# Patient Record
Sex: FEMALE | Race: WHITE | NOT HISPANIC OR LATINO | Employment: OTHER | URBAN - METROPOLITAN AREA
[De-identification: names, ages, dates, MRNs, and addresses within clinical notes are randomized per-mention and may not be internally consistent; named-entity substitution may affect disease eponyms.]

---

## 2017-04-24 ENCOUNTER — CONVERSION ENCOUNTER (OUTPATIENT)
Dept: GENERAL RADIOLOGY | Facility: HOSPITAL | Age: 62
End: 2017-04-24

## 2018-05-16 ENCOUNTER — CONVERSION ENCOUNTER (OUTPATIENT)
Dept: GENERAL RADIOLOGY | Facility: HOSPITAL | Age: 63
End: 2018-05-16

## 2019-08-07 ENCOUNTER — HOSPITAL ENCOUNTER (OUTPATIENT)
Dept: OTHER | Facility: HOSPITAL | Age: 64
Discharge: HOME | End: 2019-08-07

## 2019-08-07 LAB
ALBUMIN SERPL-MCNC: 5 G/DL (ref 3.5–5)
ALBUMIN/GLOB SERPL: 1.7 {RATIO} (ref 1.4–2.6)
ALP SERPL-CCNC: 69 U/L (ref 43–160)
ALT SERPL-CCNC: 16 U/L (ref 10–40)
ANION GAP SERPL CALC-SCNC: 28 MMOL/L (ref 8–19)
AST SERPL-CCNC: 22 U/L (ref 15–50)
BASOPHILS # BLD AUTO: 0.05 10*3/UL (ref 0–0.2)
BASOPHILS NFR BLD AUTO: 0.7 % (ref 0–3)
BILIRUB SERPL-MCNC: 0.42 MG/DL (ref 0.2–1.3)
BUN SERPL-MCNC: 18 MG/DL (ref 5–25)
BUN/CREAT SERPL: 20 {RATIO} (ref 6–20)
CALCIUM SERPL-MCNC: 10.4 MG/DL (ref 8.7–10.4)
CHLORIDE SERPL-SCNC: 102 MMOL/L (ref 99–111)
CONV ABS IMM GRAN: 0.02 10*3/UL (ref 0–0.2)
CONV CO2: 22 MMOL/L (ref 22–32)
CONV IMMATURE GRAN: 0.3 % (ref 0–1.8)
CONV TOTAL PROTEIN: 8 G/DL (ref 6.3–8.2)
CREAT UR-MCNC: 0.91 MG/DL (ref 0.5–0.9)
DEPRECATED RDW RBC AUTO: 43.2 FL (ref 36.4–46.3)
EOSINOPHIL # BLD AUTO: 0.03 10*3/UL (ref 0–0.7)
EOSINOPHIL # BLD AUTO: 0.4 % (ref 0–7)
ERYTHROCYTE [DISTWIDTH] IN BLOOD BY AUTOMATED COUNT: 12.5 % (ref 11.7–14.4)
GFR SERPLBLD BASED ON 1.73 SQ M-ARVRAT: >60 ML/MIN/{1.73_M2}
GLOBULIN UR ELPH-MCNC: 3 G/DL (ref 2–3.5)
GLUCOSE SERPL-MCNC: 106 MG/DL (ref 65–99)
HCT VFR BLD AUTO: 38 % (ref 37–47)
HGB BLD-MCNC: 12.5 G/DL (ref 12–16)
LYMPHOCYTES # BLD AUTO: 1.55 10*3/UL (ref 1–5)
LYMPHOCYTES NFR BLD AUTO: 23.2 % (ref 20–45)
MCH RBC QN AUTO: 30.9 PG (ref 27–31)
MCHC RBC AUTO-ENTMCNC: 32.9 G/DL (ref 33–37)
MCV RBC AUTO: 94.1 FL (ref 81–99)
MONOCYTES # BLD AUTO: 0.6 10*3/UL (ref 0.2–1.2)
MONOCYTES NFR BLD AUTO: 9 % (ref 3–10)
NEUTROPHILS # BLD AUTO: 4.43 10*3/UL (ref 2–8)
NEUTROPHILS NFR BLD AUTO: 66.4 % (ref 30–85)
NRBC CBCN: 0 % (ref 0–0.7)
OSMOLALITY SERPL CALC.SUM OF ELEC: 306 MOSM/KG (ref 273–304)
PLATELET # BLD AUTO: 204 10*3/UL (ref 130–400)
PMV BLD AUTO: 11.7 FL (ref 9.4–12.3)
POTASSIUM SERPL-SCNC: 4.7 MMOL/L (ref 3.5–5.3)
RBC # BLD AUTO: 4.04 10*6/UL (ref 4.2–5.4)
SODIUM SERPL-SCNC: 147 MMOL/L (ref 135–147)
TSH SERPL-ACNC: 1.11 M[IU]/L (ref 0.27–4.2)
WBC # BLD AUTO: 6.68 10*3/UL (ref 4.8–10.8)

## 2020-04-09 ENCOUNTER — TELEPHONE CONVERTED (OUTPATIENT)
Dept: FAMILY MEDICINE CLINIC | Facility: CLINIC | Age: 65
End: 2020-04-09
Attending: FAMILY MEDICINE

## 2020-04-13 ENCOUNTER — CONVERSION ENCOUNTER (OUTPATIENT)
Dept: FAMILY MEDICINE CLINIC | Facility: CLINIC | Age: 65
End: 2020-04-13

## 2020-04-13 ENCOUNTER — HOSPITAL ENCOUNTER (OUTPATIENT)
Dept: GENERAL RADIOLOGY | Facility: HOSPITAL | Age: 65
Discharge: HOME | End: 2020-04-13
Attending: FAMILY MEDICINE

## 2020-04-13 ENCOUNTER — TELEPHONE CONVERTED (OUTPATIENT)
Dept: FAMILY MEDICINE CLINIC | Facility: CLINIC | Age: 65
End: 2020-04-13
Attending: FAMILY MEDICINE

## 2020-04-14 ENCOUNTER — OFFICE VISIT CONVERTED (OUTPATIENT)
Dept: ORTHOPEDIC SURGERY | Facility: CLINIC | Age: 65
End: 2020-04-14
Attending: ORTHOPAEDIC SURGERY

## 2020-04-24 ENCOUNTER — OFFICE VISIT CONVERTED (OUTPATIENT)
Dept: ORTHOPEDIC SURGERY | Facility: CLINIC | Age: 65
End: 2020-04-24
Attending: ORTHOPAEDIC SURGERY

## 2020-05-12 ENCOUNTER — OFFICE VISIT CONVERTED (OUTPATIENT)
Dept: ORTHOPEDIC SURGERY | Facility: CLINIC | Age: 65
End: 2020-05-12
Attending: ORTHOPAEDIC SURGERY

## 2020-06-09 ENCOUNTER — OFFICE VISIT CONVERTED (OUTPATIENT)
Dept: ORTHOPEDIC SURGERY | Facility: CLINIC | Age: 65
End: 2020-06-09
Attending: ORTHOPAEDIC SURGERY

## 2020-07-23 ENCOUNTER — OFFICE VISIT CONVERTED (OUTPATIENT)
Dept: ORTHOPEDIC SURGERY | Facility: CLINIC | Age: 65
End: 2020-07-23
Attending: PHYSICIAN ASSISTANT

## 2020-09-03 ENCOUNTER — HOSPITAL ENCOUNTER (OUTPATIENT)
Dept: GENERAL RADIOLOGY | Facility: HOSPITAL | Age: 65
Discharge: HOME | End: 2020-09-03
Attending: FAMILY MEDICINE

## 2020-10-28 ENCOUNTER — CONVERSION ENCOUNTER (OUTPATIENT)
Dept: FAMILY MEDICINE CLINIC | Facility: CLINIC | Age: 65
End: 2020-10-28

## 2020-10-28 ENCOUNTER — OFFICE VISIT CONVERTED (OUTPATIENT)
Dept: FAMILY MEDICINE CLINIC | Facility: CLINIC | Age: 65
End: 2020-10-28
Attending: NURSE PRACTITIONER

## 2020-10-28 ENCOUNTER — HOSPITAL ENCOUNTER (OUTPATIENT)
Dept: GENERAL RADIOLOGY | Facility: HOSPITAL | Age: 65
Discharge: HOME | End: 2020-10-28
Attending: NURSE PRACTITIONER

## 2020-10-28 LAB
ALBUMIN SERPL-MCNC: 4.6 G/DL (ref 3.5–5)
ALBUMIN/GLOB SERPL: 1.6 {RATIO} (ref 1.4–2.6)
ALP SERPL-CCNC: 83 U/L (ref 43–160)
ALT SERPL-CCNC: 15 U/L (ref 10–40)
ANION GAP SERPL CALC-SCNC: 16 MMOL/L (ref 8–19)
AST SERPL-CCNC: 26 U/L (ref 15–50)
BASOPHILS # BLD AUTO: 0.07 10*3/UL (ref 0–0.2)
BASOPHILS NFR BLD AUTO: 0.9 % (ref 0–3)
BILIRUB SERPL-MCNC: 0.23 MG/DL (ref 0.2–1.3)
BUN SERPL-MCNC: 11 MG/DL (ref 5–25)
BUN/CREAT SERPL: 14 {RATIO} (ref 6–20)
CALCIUM SERPL-MCNC: 9.5 MG/DL (ref 8.7–10.4)
CHLORIDE SERPL-SCNC: 102 MMOL/L (ref 99–111)
CHOLEST SERPL-MCNC: 214 MG/DL (ref 107–200)
CHOLEST/HDLC SERPL: 3 {RATIO} (ref 3–6)
CONV ABS IMM GRAN: 0.01 10*3/UL (ref 0–0.2)
CONV CO2: 27 MMOL/L (ref 22–32)
CONV IMMATURE GRAN: 0.1 % (ref 0–1.8)
CONV TOTAL PROTEIN: 7.5 G/DL (ref 6.3–8.2)
CREAT UR-MCNC: 0.81 MG/DL (ref 0.5–0.9)
DEPRECATED RDW RBC AUTO: 44.6 FL (ref 36.4–46.3)
EOSINOPHIL # BLD AUTO: 0.06 10*3/UL (ref 0–0.7)
EOSINOPHIL # BLD AUTO: 0.8 % (ref 0–7)
ERYTHROCYTE [DISTWIDTH] IN BLOOD BY AUTOMATED COUNT: 12.5 % (ref 11.7–14.4)
GFR SERPLBLD BASED ON 1.73 SQ M-ARVRAT: >60 ML/MIN/{1.73_M2}
GLOBULIN UR ELPH-MCNC: 2.9 G/DL (ref 2–3.5)
GLUCOSE SERPL-MCNC: 86 MG/DL (ref 65–99)
HCT VFR BLD AUTO: 39.5 % (ref 37–47)
HDLC SERPL-MCNC: 71 MG/DL (ref 40–60)
HGB BLD-MCNC: 12.4 G/DL (ref 12–16)
LDLC SERPL CALC-MCNC: 124 MG/DL (ref 70–100)
LYMPHOCYTES # BLD AUTO: 2.27 10*3/UL (ref 1–5)
LYMPHOCYTES NFR BLD AUTO: 30.1 % (ref 20–45)
MCH RBC QN AUTO: 30.2 PG (ref 27–31)
MCHC RBC AUTO-ENTMCNC: 31.4 G/DL (ref 33–37)
MCV RBC AUTO: 96.3 FL (ref 81–99)
MONOCYTES # BLD AUTO: 0.69 10*3/UL (ref 0.2–1.2)
MONOCYTES NFR BLD AUTO: 9.2 % (ref 3–10)
NEUTROPHILS # BLD AUTO: 4.43 10*3/UL (ref 2–8)
NEUTROPHILS NFR BLD AUTO: 58.9 % (ref 30–85)
NRBC CBCN: 0 % (ref 0–0.7)
OSMOLALITY SERPL CALC.SUM OF ELEC: 291 MOSM/KG (ref 273–304)
PLATELET # BLD AUTO: 224 10*3/UL (ref 130–400)
PMV BLD AUTO: 12.7 FL (ref 9.4–12.3)
POTASSIUM SERPL-SCNC: 4.1 MMOL/L (ref 3.5–5.3)
RBC # BLD AUTO: 4.1 10*6/UL (ref 4.2–5.4)
SODIUM SERPL-SCNC: 141 MMOL/L (ref 135–147)
T4 FREE SERPL-MCNC: 1.1 NG/DL (ref 0.9–1.8)
TRIGL SERPL-MCNC: 97 MG/DL (ref 40–150)
TSH SERPL-ACNC: 1.21 M[IU]/L (ref 0.27–4.2)
VLDLC SERPL-MCNC: 19 MG/DL (ref 5–37)
WBC # BLD AUTO: 7.53 10*3/UL (ref 4.8–10.8)

## 2020-10-29 LAB
FOLATE SERPL-MCNC: >20 NG/ML (ref 4.8–20)
IRON SATN MFR SERPL: 28 % (ref 20–55)
IRON SERPL-MCNC: 109 UG/DL (ref 60–170)
TIBC SERPL-MCNC: 388 UG/DL (ref 245–450)
TRANSFERRIN SERPL-MCNC: 271 MG/DL (ref 250–380)
VIT B12 SERPL-MCNC: 745 PG/ML (ref 211–911)

## 2021-05-10 NOTE — H&P
"   History and Physical      Patient Name: Alissa Bruce   Patient ID: [id number]   Sex: Female   YOB: 1955    Primary Care Provider: Rubén Garcia DO   Referring Provider: Rubén Garcia DO    Visit Date: April 14, 2020    Provider: Eric Duff MD   Location: [facility]   Location Address: [address] [place] [address]  [id number]   Location Phone: [phone]          Chief Complaint  · Right humerus pain       History Of Present Illness  Alissa Bruce is a 64 year old /White female who presents today to [facility].      Patient presents today for right humerus pain. Patient has had some recent falls, previously injuring her back. Patient has had previous X-Ray showing mildly displaced humerus fracture. The patient was placed into a sling. Patient reports pain in the right shoulder and the deltoid area of the mid arm. Patient is otherwise healthy. Patient is right hand dominant.       Review of Systems  · Constitutional  o Denies  o : fever, chills, weight loss  · Cardiovascular  o Denies  o : chest pain, shortness of breath  · Gastrointestinal  o Denies  o : liver disease, heartburn, nausea, blood in stools  · Genitourinary  o Denies  o : painful urination, blood in urine  · Integument  o Denies  o : rash, itching  · Neurologic  o Denies  o : headache, weakness, loss of consciousness  · Musculoskeletal  o Denies  o : painful, swollen joints  · Psychiatric  o Denies  o : drug/alcohol addiction, anxiety, depression      Vitals  Date Time BP Position Site L\R Cuff Size HR RR TEMP (F) WT  HT  BMI kg/m2 BSA m2 O2 Sat        04/14/2020 01:29 PM      74 - R   140lbs 4oz 5'  3\" 24.84 1.68 98 %          Physical Examination  · Constitutional  o Appearance  o : well developed, well-nourished, no obvious deformities present  · Head and Face  o Head  o :   § Inspection  § : normocephalic  o Face  o :   § Inspection  § : no facial lesions  · Eyes  o Conjunctivae  o : " conjunctivae normal  o Sclerae  o : sclerae white  · Ears, Nose, Mouth and Throat  o Ears  o :   § External Ears  § : appearance within normal limits  § Hearing  § : intact  o Nose  o :   § External Nose  § : appearance normal  · Neck  o Inspection/Palpation  o : normal appearance  o Range of Motion  o : full range of motion  · Respiratory  o Respiratory Effort  o : breathing unlabored  o Inspection of Chest  o : normal appearance  o Auscultation of Lungs  o : no audible wheezing or rales  · Cardiovascular  o Heart  o : regular rate  · Gastrointestinal  o Abdominal Examination  o : soft and non-tender  · Skin and Subcutaneous Tissue  o General Inspection  o : intact, no rashes  · Psychiatric  o General  o : Alert and oriented x3  o Judgement and Insight  o : judgment and insight intact  o Mood and Affect  o : mood normal, affect appropriate  · Right Arm  o Inspection  o : skin is intact. Mild swelling. Tenderness to palpation to right upper arm. Minimal pain with elbow range of motion as well as shoulder range of motion. Neurovascularly intact to right upper extremity.   · In Office Procedures  o View  o : AP/LATERAL  o Site  o : Right shoulder   o Indication  o : Right shoulder pain   o Study  o : X-rays ordered, taken in the office, and reviewed today.  o Comparative Data  o : No comparative data found  · Imaging  o Imaging  o : Mildly displaced and impacted fracture of the neck of the proximal right humerus.          Assessment  · Right shoulder pain, unspecified chronicity     719.41/M25.511  · Humerus fracture     812.20/S42.309A      Plan  · Orders  o Scapula (Right) [facility] Preferred View ([id number]) - 719.41/M25.511 - 04/14/2020  · Medications  o Medications have been Reconciled  o Transition of Care or Provider Policy  · Instructions  o Reviewed the patient's Past Medical, Social, and Family history as well as the ROS at today's visit, no changes.  o Call or return if worsening symptoms.  o This note was  transcribed by Bernardo Lozano. cecilia.  o Discussed with patient operative vs. non operative treatment. Patient wishes to proceed with initial conservative treatment. Patient was prescribed Norco today and will follow up in 10 days for repeat X-Rays of shoulder. New immobilizer given today.            Electronically Signed by: Kenneth Lozano - , Other -Author on April 15, 2020 02:21:02 PM  Electronically Co-signed by: Eric Duff MD -Reviewer on April 16, 2020 09:21:19 PM

## 2021-05-12 NOTE — PROGRESS NOTES
Progress Note      Patient Name: Alissa Bruce   Patient ID: [id number]   Sex: Female   YOB: 1955    Primary Care Provider: Rubén Garcia DO   Referring Provider: Rubén Garcia DO    Visit Date: April 24, 2020    Provider: Eric Duff MD   Location: [facility]   Location Address: [address] [place] [address]  [id number]   Location Phone: [phone]          Chief Complaint  · Right Proximal Humerus Fracture      History Of Present Illness  Alissa Bruce is a 64 year old /White female who presents today to [facility].      She presents for follow up of the right shoulder. She's overall been doing well. She says that she's noticing increasing bruising in the arm. She's been using the hand. She's been compliant with sling wear. She was taking some pain medication.       Past Medical History  Depression with anxiety; Hip pain; Pap smear for cervical cancer screening; Pap Smear for Vaginal Cancer Screening; Screening Mammogram         Past Surgical History  Colonoscopy; Cyst Removal         Medication List  cyclobenzaprine 10 mg oral tablet; diclofenac sodium 75 mg oral tablet,delayed release (DR/EC); lorazepam 1 mg oral tablet; mirtazapine 15 mg oral tablet; Norco 7.5-325 mg oral tablet; risperidone 1 mg oral tablet; sertraline 100 mg oral tablet         Allergy List  SULFA (SULFONAMIDES)         Family Medical History  Heart Disease; Cancer, Unspecified; Diabetes, unspecified type         Social History  Alcohol Use (Never); Denies alcohol consumption; lives with spouse; .; Nonsmoker (Never); Recreational Drug Use (Never); Retired.; Tobacco (Never)         Review of Systems  · Constitutional  o Denies  o : fever, chills, weight loss  · Cardiovascular  o Denies  o : chest pain, shortness of breath  · Gastrointestinal  o Denies  o : liver disease, heartburn, nausea, blood in stools  · Genitourinary  o Denies  o : painful urination, blood in  "urine  · Integument  o Denies  o : rash, itching  · Neurologic  o Admits  o : headache, weakness  o Denies  o : loss of consciousness  · Musculoskeletal  o Denies  o : painful, swollen joints  · Psychiatric  o Admits  o : anxiety, depression  o Denies  o : drug/alcohol addiction      Vitals  Date Time BP Position Site L\R Cuff Size HR RR TEMP (F) WT  HT  BMI kg/m2 BSA m2 O2 Sat        04/24/2020 10:18 AM         140lbs 0oz 5'  3\" 24.8 1.68           Physical Examination  · Constitutional  o Appearance  o : well developed, well-nourished, no obvious deformities present  · Head and Face  o Head  o :   § Inspection  § : normocephalic  o Face  o :   § Inspection  § : no facial lesions  · Eyes  o Conjunctivae  o : conjunctivae normal  o Sclerae  o : sclerae white  · Ears, Nose, Mouth and Throat  o Ears  o :   § External Ears  § : appearance within normal limits  § Hearing  § : intact  o Nose  o :   § External Nose  § : appearance normal  · Neck  o Inspection/Palpation  o : normal appearance  o Range of Motion  o : full range of motion  · Respiratory  o Respiratory Effort  o : breathing unlabored  o Inspection of Chest  o : normal appearance  o Auscultation of Lungs  o : no audible wheezing or rales  · Cardiovascular  o Heart  o : regular rate  · Gastrointestinal  o Abdominal Examination  o : soft and non-tender  · Skin and Subcutaneous Tissue  o General Inspection  o : intact, no rashes  · Psychiatric  o General  o : Alert and oriented x3  o Judgement and Insight  o : judgment and insight intact  o Mood and Affect  o : mood normal, affect appropriate  · Right Shoulder  o Inspection  o : Resoling bruising/ecchymosis to the distal upper arm and medial elbow area. Neurovascularly intact to the hand. Mild swelling to the fingers. Elbow ROM intact. She can flex and extend the shoulder and abduct to about 50 degrees. No crepitus. Minimal pain with ROM.   · In Office Procedures  o View  o : AP/LATERAL  o Site  o : right " shoulder  o Indication  o : right shoulder pain  o Study  o : X-rays ordered, taken in the office, and reviewed today.  o Xray  o : Impacted two part proximal humerus fracture; Diggs anterior angulation on the lateral view; No glenohumeral subluxation; No dislocation.   o Comparative Data  o : No comparative data found          Assessment  · Right proximal humerus fracture     812.00/S42.201A  · Pain of right upper extremity     729.5/M79.601  · Right shoulder pain, unspecified chronicity     719.41/M25.511      Plan  · Orders  o Scapula (Right) [facility] Preferred View ([id number]) - 719.41/M25.511 - 04/24/2020  · Medications  o Medications have been Reconciled  o Transition of Care or Provider Policy  · Instructions  o Reviewed the patient's Past Medical, Social, and Family history as well as the ROS at today's visit, no changes.  o Call or return if worsening symptoms.  o This note was transcribed by Angie Pineda. jsb  o Discussed treatment options with patient. Discussed ORIF versus nonoperative treatment. She does have an angulated proximal humerus fracture; however, it is impacted and appears stable. She is clinically doing very well. Her pain has been controlled. She has the ability to move the shoulder without much discomfort. I discussed that fixing her shoulder, at this point, I am not sure it would afford her a superior clinical outcome as nonoperative treatment. She expressed an understanding of this. I suggested continuing with nonoperative treatment. Follow up in 2 week with repeat x-rays to the shoulder. Likely she can begin weaning out of the sling and physical therapy at that time. We will get her a refill of pain medication to take us needed. Follow up with any problems.            Electronically Signed by: Angie Pineda - , Other -Author on April 24, 2020 03:03:35 PM  Electronically Co-signed by: Eric Duff MD -Reviewer on April 26, 2020 08:46:40 PM  Electronically  Co-signed by: Dunia Bliss Other -Reviewer on May 11, 2020 10:42:39 AM

## 2021-05-12 NOTE — PROGRESS NOTES
Quick Note      Patient Name: Alissa Bruce   Patient ID: [id number]   Sex: Female   YOB: 1955    Referring Provider: Rubén Garcia DO    Visit Date: April 13, 2020    Provider: Rubén Garcia DO   Location: [facility]   Location Address: [address] [place] [address] [address]  [place], [place]  [id number]   Location Phone: [phone]          History Of Present Illness  TELEHEALTH VISIT  Chief Complaint: Pt states light headed, dizzy, fell at 1pm today and hurt arm between elbow and shoulder, states has had the dizziness for about a month or two. Does not have any energy heart rate has been elevated blood pressure has been low. Around 92/66 Pt states  is present, Feng Bruce is a 64 year old /White female who is presenting for evaluation via telehealth visit. Verbal consent obtained before beginning visit.   Provider spent 15 minutes with the patient during telehealth visit.   The following staff were present during this visit: Doris Jaramillo LPN, Rubén Garcia DO   Past Medical History/Overview of Patient Symptoms       Patient called complaining of a fall on her arm, history of osteoporosis she reports and had fallen a few weeks back and landed on her tail bone. Says she couldn't move her arm up at the shoulder at all without considerable pain so advised her to come in for x-ray later today and see if broken and would refer to Ortho and provide sling if indeed did       Vitals  Date Time BP Position Site L\R Cuff Size HR RR TEMP (F) WT  HT  BMI kg/m2 BSA m2 O2 Sat HC       04/13/2020 03:04 /78 Sitting    91 - R                     Assessment  · Shoulder pain, right     719.41/M25.511  · Fall     E888.9/W19.XXXA  · Dizziness     780.4/R42  · Polypharmacy     V58.69/Z79.899  · Arm pain     729.5/M79.603         Fall  r/o humeral fracture  *order x-ray and evaluate for fracture, supportive care or referral based on results  **humeral fracture  after review and patient presented for imaging  discussed with ortho and referred to be seen next day, prn NSAIDs    advised STRONGLY adjust meds or wean down off 1 of 3 or more medicines contributing to dizziness     Problems Reconciled  Plan  · Orders  o Humerus (Right) 2 or more views X-Ray [facility] Preferred View ([id number]) - 719.41/M25.511 - 04/13/2020   Dr. Duff will see pt in morning.   o ORTHOPEDIC CONSULTATION (ORTHO) - 719.41/M25.511 - 04/13/2020  · Medications  o diclofenac sodium 75 mg oral tablet,delayed release (DR/EC)   SIG: take 1 tablet (75 mg) by oral route 2 times per day for 15 days   DISP: (30) tablets with 0 refills  Prescribed on 04/13/2020     o Medications have been Reconciled  o Transition of Care or Provider Policy  · Instructions  o Plan Of Care:   o Chronic conditions reviewed and taken into consideration for today's treatment plan.  o Patient instructed to seek medical attention urgently for new or worsening symptoms.  o Call the office with any concerns or questions.  o Risks, benefits, and alternatives were discussed with the patient. The patient is aware of risks associated with:  · Disposition  o Call or Return if symptoms worsen or persist.            Electronically Signed by: Rubén Garcia DO -Author on April 29, 2020 11:03:35 PM

## 2021-05-12 NOTE — PROGRESS NOTES
"   Quick Note      Patient Name: Alissa Bruce   Patient ID: [id number]   Sex: Female   YOB: 1955        Visit Date: April 9, 2020    Provider: Rubén Garcia DO   Location: [facility]   Location Address: [address] [place] [address] [address]  [place], [place]  [id number]   Location Phone: [phone]          History Of Present Illness  TELEHEALTH VISIT  Chief Complaint: establish care   Alissa Bruce is a 64 year old /White female who is presenting for evaluation via telehealth visit. Verbal consent obtained before beginning visit.   Provider spent 15 minutes with the patient during telehealth visit.   The following staff were present during this visit: Rubén Garcia DO   Past Medical History/Overview of Patient Symptoms       Patient calls to establish care, has been calling in to clinic requesting medicines for her hip after she fell on her bottom about a couple weeks ago. She did not go to ED and didn't call right after it happened, reported to be ambulating well without problems just sore. Advised her to come in or make an apt but for whatever reason she has just now gotten scheduled.     PMH significant for depression anxiety, on lorazepam and antipsychotics managed by Psych in [facility] reviewed. Otherwise she has still yet to bring in paperwork for new patient, insurance or anything.     She did discuss medicines above with Psych possibly making her a little dizzy lightheaded when she fell. She is taking nsaids and Tylenol, flexeril helps intermittently and feels weak on her feet at times. No further falls, denies prior fractures or back problems. Admits history of osteoporosis in which treatment \"didn't work\" though, no DEXA recent bloodwork or imaging.     She denies blood sugar or pressure problems, heart disease or other.           Assessment  · Hip pain     719.45/M25.559  · Depression with anxiety     300.4/F41.8  · Low back pain     724.2/M54.5         Low " back, sacral pain  *subacute  continue Tylenol and motrin  renew flexeril, precautions given strongly  f/u psych on meds and cause for intermittent dizziness  back xray and sacral view ordered as well as routine labs, cbc cmp flp tsh  f/u 1-2 weeks to review, can have done at her convenience advised to call ahead  consider dexa additionally    anxiety depression  *controlled, f/u psych    f/u 1-2 weeks for back pain and fall, review imaging and labs if done, ordered to be done by patient if worse not improving or concerned       Plan  · Orders  o Physical, Primary Care Panel (CBC, CMP, Lipid, TSH) [facility] ([id number], 91389, [id number], [id number]) - 719.45/M25.559, 300.4/F41.8, 724.2/M54.5 - 04/09/2020  o Lumbar Spine AP and Lateral X-Ray [facility] ([id number]) - 719.45/M25.559, 724.2/M54.5 - 04/09/2020   advised patient to call ahead of time to get labs and xray if she wants, schedule lab xray apt   o Sacrum and Coccyx 2-3 views [facility] Preferred View ([id number]) - 719.45/M25.559, 724.2/M54.5 - 04/09/2020  · Medications  o cyclobenzaprine 10 mg oral tablet   SIG: take 1 tablet (10 mg) by oral route 3 times per day for 5 days   DISP: (15) tablets with 1 refills  Refilled on 04/09/2020     o Medications have been Reconciled  · Instructions  o Plan Of Care:   o Patient was educated/instructed on their diagnosis, treatment and medications prior to discharge from the clinic today.  o Patient is taking medications as prescribed and doing well.   o Risks, benefits, and alternatives were discussed with the patient. The patient is aware of risks associated with:  o Discussed Covid-19 precautions including, but not limited to, social distancing, avoid touching your face, and hand washing.   · Disposition  o Call or Return if symptoms worsen or persist.  o Labs at follow up  o Return Visit Request in/on 2 weeks +/- 2 days ([id number]).            Electronically Signed by: Rubén Garcia DO -Author on April 9, 2020 11:00:47 AM

## 2021-05-13 NOTE — PROGRESS NOTES
Progress Note      Patient Name: Alissa Bruce   Patient ID: [id number]   Sex: Female   YOB: 1955    Primary Care Provider: Rubén Garcia DO   Referring Provider: Rubén Garcia DO    Visit Date: May 12, 2020    Provider: Eric Duff MD   Location: [facility]   Location Address: [address] [place] [address]  [id number]   Location Phone: [phone]          Chief Complaint  · Right Proximal Shoulder Fracture      History Of Present Illness  Alissa Bruce is a 64 year old /White female who presents today to [facility].      Patinet presents today for follow up of right proximal shoulder fracture. Patient is doing well today she has been in a sling. Patient has been using the arm some, but no strenuous activities. She has no new complaints or injuries. She has had a little bit of bruising and swelling to the arm. Patient has mainly good days but there are some that are painful. Patient is trending in a much better direction in regards to her pain and use of her arm.       Past Medical History  Depression with anxiety; Hip pain; Pap smear for cervical cancer screening; Pap Smear for Vaginal Cancer Screening; Screening Mammogram         Past Surgical History  Colonoscopy; Cyst Removal         Medication List  cyclobenzaprine 10 mg oral tablet; diclofenac sodium 75 mg oral tablet,delayed release (DR/EC); lorazepam 1 mg oral tablet; mirtazapine 15 mg oral tablet; Norco 7.5-325 mg oral tablet; risperidone 1 mg oral tablet; sertraline 100 mg oral tablet         Allergy List  SULFA (SULFONAMIDES)         Family Medical History  Heart Disease; Cancer, Unspecified; Diabetes, unspecified type         Social History  Alcohol Use (Never); Denies alcohol consumption; lives with spouse; .; Nonsmoker (Never); Recreational Drug Use (Never); Retired.; Tobacco (Never)         Review of Systems  · Constitutional  o Denies  o : fever, chills, weight  "loss  · Cardiovascular  o Denies  o : chest pain, shortness of breath  · Gastrointestinal  o Denies  o : liver disease, heartburn, nausea, blood in stools  · Genitourinary  o Denies  o : painful urination, blood in urine  · Integument  o Denies  o : rash, itching  · Neurologic  o Denies  o : headache, weakness, loss of consciousness  · Musculoskeletal  o Denies  o : painful, swollen joints  · Psychiatric  o Denies  o : drug/alcohol addiction, anxiety, depression      Vitals  Date Time BP Position Site L\R Cuff Size HR RR TEMP (F) WT  HT  BMI kg/m2 BSA m2 O2 Sat        05/12/2020 10:32 AM      76 - R   140lbs 0oz 5'  3\" 24.8 1.68 97 %          Physical Examination  · Constitutional  o Appearance  o : well developed, well-nourished, no obvious deformities present  · Head and Face  o Head  o :   § Inspection  § : normocephalic  o Face  o :   § Inspection  § : no facial lesions  · Eyes  o Conjunctivae  o : conjunctivae normal  o Sclerae  o : sclerae white  · Ears, Nose, Mouth and Throat  o Ears  o :   § External Ears  § : appearance within normal limits  § Hearing  § : intact  o Nose  o :   § External Nose  § : appearance normal  · Neck  o Inspection/Palpation  o : normal appearance  o Range of Motion  o : full range of motion  · Respiratory  o Respiratory Effort  o : breathing unlabored  o Inspection of Chest  o : normal appearance  o Auscultation of Lungs  o : no audible wheezing or rales  · Cardiovascular  o Heart  o : regular rate  · Gastrointestinal  o Abdominal Examination  o : soft and non-tender  · Skin and Subcutaneous Tissue  o General Inspection  o : intact, no rashes  · Psychiatric  o General  o : Alert and oriented x3  o Judgement and Insight  o : judgment and insight intact  o Mood and Affect  o : mood normal, affect appropriate  · Right Arm  o Inspection  o : Patient has some mild swelling to the right arm. Neurovascularly intact to the hand. Elbow ROM is intact. Forward elevation is 90. abduction 80. " internal rotation of the side. external rotation is 50. strength 4 out of 5. No crepitus with ROM minimal pain with ROM  · In Office Procedures  o View  o : AP/LATERAL  o Site  o : right, shoulder   o Indication  o : Right shoulder pain   o Study  o : X-rays ordered, taken in the office, and reviewed today.  o Xray  o : Healing impacted proximal humerus fracture, mild displacement and angulation. Progressive healing of fracture.               Assessment  · Fracture, humerus     812.20/S42.309A  · Right shoulder pain, unspecified chronicity     719.41/M25.511      Plan  · Orders  o Scapula (Right) [facility] Preferred View ([id number]) - 719.41/M25.511 - 05/12/2020  · Medications  o Medications have been Reconciled  o Transition of Care or Provider Policy  · Instructions  o Dr. Duff saw and examined the patient and agrees with plan.   o X-rays reviewed by Dr. Duff.  o Reviewed the patient's Past Medical, Social, and Family history as well as the ROS at today's visit, no changes.  o Call or return if worsening symptoms.  o This note was transcribed by Bernardo Lozano. cecilia.  o Discussed plan with patient. She has some anxiety and does not wish to do a formal PT program. I have shown her PT ROM activities in the office today. We provided a handout with home exercises. Follow up in one month. Repeat X-Rays to the right shoulder. Advised patient to call with any problems.             Electronically Signed by: Kenneth Lozano - , Other -Author on May 13, 2020 09:35:29 AM  Electronically Co-signed by: Eric Duff MD -Reviewer on May 13, 2020 09:24:24 PM

## 2021-05-13 NOTE — PROGRESS NOTES
Progress Note      Patient Name: Alissa Bruce   Patient ID: [id number]   Sex: Female   YOB: 1955    Primary Care Provider: Rubén Garcia DO   Referring Provider: Rubén Garcia DO    Visit Date: July 23, 2020    Provider: Brent Santiago PA-C   Location: [facility]   Location Address: [address] [place] [address]  [id number]   Location Phone: [phone]          Chief Complaint  · Right shoulder pain  · Right elbow pain      History Of Present Illness  Alissa Bruce is a 64 year old /White female who presents today to [facility]. Patient presents for follow-up evaluation of right proximal humerus fracture and also complaining of right elbow swelling. Patient states her right shoulder is doing well she has never attended physical therapy she chose not to and has been doing home exercises she says that she is using the shoulder without too much pain but certain movements cause her pain when trying to reach above her head or behind her back. Patient denies new pain or injury. No x-rays were taken today of the right shoulder. Patient states that 2 to 3 weeks ago her right elbow she noticed started swelling she states it is soft and squishy she states she has had no injury to the elbow, no prior episodes, patient states it became swollen and has stayed the same since it became swollen.       Past Medical History  Depression with anxiety; Hip pain; Pap smear for cervical cancer screening; Pap Smear for Vaginal Cancer Screening; Screening Mammogram         Past Surgical History  Colonoscopy; Cyst Removal         Medication List  diclofenac sodium 75 mg oral tablet,delayed release (DR/EC); lorazepam 1 mg oral tablet; mirtazapine 15 mg oral tablet; risperidone 1 mg oral tablet; sertraline 100 mg oral tablet         Allergy List  SULFA (SULFONAMIDES)       Allergies Reconciled  Family Medical History  Heart Disease; Cancer, Unspecified; Diabetes, unspecified type  "        Social History  Alcohol Use (Never); Denies alcohol consumption; lives with spouse; .; Nonsmoker (Never); Recreational Drug Use (Never); Retired.; Tobacco (Never)         Review of Systems  · Constitutional  o Denies  o : fever, chills, weight loss  · Cardiovascular  o Denies  o : chest pain, shortness of breath  · Gastrointestinal  o Denies  o : liver disease, heartburn, nausea, blood in stools  · Genitourinary  o Denies  o : painful urination, blood in urine  · Integument  o Denies  o : rash, itching  · Neurologic  o Denies  o : headache, weakness, loss of consciousness  · Musculoskeletal  o Denies  o : painful, swollen joints  · Psychiatric  o Denies  o : drug/alcohol addiction, anxiety, depression      Vitals  Date Time BP Position Site L\R Cuff Size HR RR TEMP (F) WT  HT  BMI kg/m2 BSA m2 O2 Sat        07/23/2020 10:41 AM      77 - R   141lbs 2oz 5'  3\" 25 1.69 98 %          Physical Examination  · Constitutional  o Appearance  o : well developed, well-nourished, no obvious deformities present  · Head and Face  o Head  o :   § Inspection  § : normocephalic  o Face  o :   § Inspection  § : no facial lesions  · Eyes  o Conjunctivae  o : conjunctivae normal  o Sclerae  o : sclerae white  · Ears, Nose, Mouth and Throat  o Ears  o :   § External Ears  § : appearance within normal limits  § Hearing  § : intact  o Nose  o :   § External Nose  § : appearance normal  · Neck  o Inspection/Palpation  o : normal appearance  o Range of Motion  o : full range of motion  · Respiratory  o Respiratory Effort  o : breathing unlabored  o Inspection of Chest  o : normal appearance  o Auscultation of Lungs  o : no audible wheezing or rales  · Cardiovascular  o Heart  o : regular rate  · Gastrointestinal  o Abdominal Examination  o : soft and non-tender  · Skin and Subcutaneous Tissue  o General Inspection  o : intact, no rashes  · Psychiatric  o General  o : Alert and oriented x3  o Judgement and Insight  o : " judgment and insight intact  o Mood and Affect  o : mood normal, affect appropriate  · Right Shoulder  o Inspection  o : Nontender to palpation, forward elevation 150, abduction 100, external rotation at 90 degrees abduction 45, internal rotation at 90 degrees abduction 45, mild stiffness and pain with range of motion.  · Right Elbow  o Inspection  o : Skin is intact, swelling to the posterior elbow with fluid, no tenderness to palpation, full range of motion of the elbow with flexion extension pronation supination. No redness no heat, no signs of infection.  · Injection Note/Aspiration Note  o Site  o : right elbow  o Procedure  o : Procedure: After educating the patient, patient gave consent for procedure. After using Chloraprep, the joint space was injected. The patient tolerated the procedure well.  o Medication  o : Aspiration prior to injection of 80 mg of DepoMedrol with 1cc of 1% Lidocaine  · In Office Procedures  o View  o : AP/LATERAL  o Site  o : right, elbow  o Indication  o : Right elbow pain  o Study  o : X-rays ordered, taken in the office, and reviewed today.  o Xray  o : No fracture, no dislocation, swelling to posterior elbow.  o Comparative Data  o : No comparative data found          Assessment  · Right elbow pain     719.42/M25.521  · Right shoulder pain, unspecified chronicity     719.41/M25.511  · Right proximal humerus fracture     812.00/S42.209A  · Bursitis of right elbow     726.33/M70.31      Plan  · Orders  o Depo-Medrol injection 80mg () - 719.42/M25.521 - 07/23/2020   Lot [id number] Exp 06 2021 [employer] Administered by NILA SCHERER PA-C  o Elbow-Lat Single Tendon (Injection) (36750) - 719.42/M25.521 - 07/23/2020   Lot [id number] Exp 07 2021 [employer] Administered by NILA SCHERER PA-C  o Elbow (Right) 2 views X-Ray [facility] ([id number]) - 719.42/M25.521 - 07/23/2020  · Medications  o Medications have been Reconciled  o Transition of Care or Provider  Policy  · Instructions  o Reviewed the patient's Past Medical, Social, and Family history as well as the ROS at today's visit, no changes.  o Call or return if worsening symptoms.  o Follow up in 6-8 weeks.  o Advised patient to continue activity as tolerated for the right shoulder, patient was written for physical therapy but she states she may or may not attend. Reviewed right elbow x-rays with the patient, patient elected to have right elbow aspiration and injection, patient tolerated this well. 6 cc of clear yellow aspirate were obtained. Patient was then injected with steroids and wrapped with compression dressing.. Follow-up 6 to 8 weeks for reevaluation.            Electronically Signed by: AYAN Muller-GARFIELD -Author on July 23, 2020 12:19:54 PM  Electronically Co-signed by: Eric Duff MD -Reviewer on July 23, 2020 09:54:00 PM

## 2021-05-13 NOTE — PROGRESS NOTES
Progress Note      Patient Name: Alissa Bruce   Patient ID: [id number]   Sex: Female   YOB: 1955    Primary Care Provider: Rubén Garica DO   Referring Provider: Rubén Garcia DO    Visit Date: June 9, 2020    Provider: Eric Duff MD   Location: [facility]   Location Address: [address] [place] [address]  [id number]   Location Phone: [phone]          Chief Complaint  · Right shoulder pain      History Of Present Illness  Alissa Bruce is a 64 year old /White female who presents today to [facility].      The patient presents for follow up with her proximal right shoulder fracture. The patient reports some pain down the arm. She has been treating the pain with Tylenol and working on home exercises. She reports some shoulder stiffness when she wakes up in the morning.                      Past Medical History  Depression with anxiety; Hip pain; Pap smear for cervical cancer screening; Pap Smear for Vaginal Cancer Screening; Screening Mammogram         Past Surgical History  Colonoscopy; Cyst Removal         Medication List  cyclobenzaprine 10 mg oral tablet; diclofenac sodium 75 mg oral tablet,delayed release (DR/EC); lorazepam 1 mg oral tablet; mirtazapine 15 mg oral tablet; Norco 7.5-325 mg oral tablet; risperidone 1 mg oral tablet; sertraline 100 mg oral tablet         Allergy List  SULFA (SULFONAMIDES)         Family Medical History  Heart Disease; Cancer, Unspecified; Diabetes, unspecified type         Social History  Alcohol Use (Never); Denies alcohol consumption; lives with spouse; .; Nonsmoker (Never); Recreational Drug Use (Never); Retired.; Tobacco (Never)         Review of Systems  · Constitutional  o Denies  o : fever, chills, weight loss  · Cardiovascular  o Denies  o : chest pain, shortness of breath  · Gastrointestinal  o Denies  o : liver disease, heartburn, nausea, blood in stools  · Genitourinary  o Denies  o : painful  "urination, blood in urine  · Integument  o Denies  o : rash, itching  · Neurologic  o Denies  o : headache, weakness, loss of consciousness  · Musculoskeletal  o Denies  o : painful, swollen joints  · Psychiatric  o Denies  o : drug/alcohol addiction, anxiety, depression      Vitals  Date Time BP Position Site L\R Cuff Size HR RR TEMP (F) WT  HT  BMI kg/m2 BSA m2 O2 Sat        06/09/2020 11:06 AM         140lbs 0oz 5'  3\" 24.8 1.68           Physical Examination  · Constitutional  o Appearance  o : well developed, well-nourished, no obvious deformities present  · Head and Face  o Head  o :   § Inspection  § : normocephalic  o Face  o :   § Inspection  § : no facial lesions  · Eyes  o Conjunctivae  o : conjunctivae normal  o Sclerae  o : sclerae white  · Ears, Nose, Mouth and Throat  o Ears  o :   § External Ears  § : appearance within normal limits  § Hearing  § : intact  o Nose  o :   § External Nose  § : appearance normal  · Neck  o Inspection/Palpation  o : normal appearance  o Range of Motion  o : full range of motion  · Respiratory  o Respiratory Effort  o : breathing unlabored  o Inspection of Chest  o : normal appearance  o Auscultation of Lungs  o : no audible wheezing or rales  · Cardiovascular  o Heart  o : regular rate  · Gastrointestinal  o Abdominal Examination  o : soft and non-tender  · Skin and Subcutaneous Tissue  o General Inspection  o : intact, no rashes  · Psychiatric  o General  o : Alert and oriented x3  o Judgement and Insight  o : judgment and insight intact  o Mood and Affect  o : mood normal, affect appropriate  · Right Shoulder  o Inspection  o : , AB 90, ER 55, IR to L3, no crepitus. Non tender to palpation. Mild swelling. Neurovascularly intact. No popping or catching.   · In Office Procedures  o View  o : AP/LATERAL  o Site  o : right shoulder  o Indication  o : right shoulder pain  o Study  o : X-rays ordered, taken in the office, and reviewed today.  o Xray  o : Healing " surgical neck fracture with progressive healing and no callous formation, good joint placement  o Comparative Data  o : No comparative data found              Assessment  · Right shoulder pain, unspecified chronicity     719.41/M25.511  · Fracture : right proximal shoulder     829.0/T14.8XXA      Plan  · Orders  o Scapula (Right) [facility] Preferred View ([id number]-QT) - 719.41/M25.511 - 06/09/2020  · Medications  o Medications have been Reconciled  o Transition of Care or Provider Policy  · Instructions  o Dr. Duff saw and examined the patient and agrees with plan.   o X-rays reviewed by Dr. Duff.  o Reviewed the patient's Past Medical, Social, and Family history as well as the ROS at today's visit, no changes.  o Call or return if worsening symptoms.  o The above service was scribed by Bernardo Lozano on my behalf and I attest to the accuracy of the note. jsb  o Discussed treatment options with the patient. She is doing well today. We discussed that PT would really help her. Prescription given for PT today. ROM as tolerated. Follow up in 6 weeks to re-check her ROM. No xrays needed unless clinically indicated.             Electronically Signed by: Kenneth Lozano - , Other -Author on Teresa 10, 2020 09:32:38 AM  Electronically Co-signed by: Eric Duff MD -Reviewer on Teresa 10, 2020 10:11:44 PM

## 2021-05-13 NOTE — PROGRESS NOTES
Progress Note      Patient Name: Alissa Bruce   Patient ID: [id number]   Sex: Female   YOB: 1955    Primary Care Provider: Rubén Garcia DO   Referring Provider: Rubén Garcia DO    Visit Date: October 28, 2020    Provider: SHAQ Acosta   Location: [facility]   Location Address: [address] [place] [address]  [id number]   Location Phone: [phone]          Chief Complaint  · fatigue/weakness      History Of Present Illness  Alissa Bruce is a 64 year old /White female who presents for evaluation and treatment of:      Pt presents with fatigue/weakness, dizziness x several years. No recent labs. Takes lorazapam, sertaline, mirtazapine, rexulti, oxcarpazipine. Goes to Dr. Wallace at [facility] for psych tx.       Past Medical History  Disease Name Date Onset Notes   Depression with anxiety --  --    Hip pain --  --    Pap smear for cervical cancer screening 2018 --    Pap Smear for Vaginal Cancer Screening 2018 --    Screening Mammogram 2018 --          Past Surgical History  Procedure Name Date Notes   Colonoscopy 2008 --    Cyst Removal 1976 wrist         Medication List  Name Date Started Instructions   diclofenac sodium 75 mg oral tablet,delayed release (/EC) 04/13/2020 take 1 tablet (75 mg) by oral route 2 times per day for 15 days   lorazepam 1 mg oral tablet 03/18/2020 --    mirtazapine 15 mg oral tablet  take 1 tablet (15 mg) by oral route once daily before bedtime for 30 days   oxcarbazepine 150 mg oral tablet  take 2 tablets (300 mg) by oral route 2 times per day for 30 days   Rexulti 2 mg oral tablet  take 1 tablet (2 mg) by oral route once daily for 30 days         Allergy List  Allergen Name Date Reaction Notes   SULFA (SULFONAMIDES) --  --  --          Family Medical History  Disease Name Relative/Age Notes   Heart Disease Mother/   Mother   Cancer, Unspecified Brother/   Brother   Diabetes, unspecified type Mother/   Mother  "        Social History  Finding Status Start/Stop Quantity Notes   Alcohol Use Never --/-- --  does not drink   Denies alcohol consumption --  --/-- --  --    lives with spouse --  --/-- --  --    . --  --/-- --  --    Nonsmoker Never --/-- --  04/13/2020 -    Recreational Drug Use Never --/-- --  no   Retired. --  --/-- --  --    Tobacco Never --/-- --  never smoker         Review of Systems  · Constitutional  o Admits  o : fatigue, weakness  o Denies  o : fever, weight loss, weight gain  · Cardiovascular  o Denies  o : lower extremity edema, claudication, chest pressure, palpitations  · Respiratory  o Denies  o : shortness of breath, wheezing, cough, hemoptysis, dyspnea on exertion  · Gastrointestinal  o Denies  o : nausea, vomiting, diarrhea, constipation, abdominal pain  · Psychiatric  o Admits  o : anxiety, depression  o Denies  o : suicidal ideation, homicidal ideation      Vitals  Date Time BP Position Site L\R Cuff Size HR RR TEMP (F) WT  HT  BMI kg/m2 BSA m2 O2 Sat FR L/min FiO2        10/28/2020 02:08 /74 Sitting    98 - R 20 98 144lbs 8oz 5'  3\" 25.6 1.71 99 %  21%          Physical Examination  · Constitutional  o Appearance  o : no acute distress, well-nourished  · Head and Face  o Head  o :   § Inspection  § : atraumatic, normocephalic  o Face  o :   § Inspection  § : no facial lesions  · Eyes  o Eyes  o : extraocular movements intact, no scleral icterus, no conjunctival injection  · Ears, Nose, Mouth and Throat  o Ears  o :   § Ears  § : normal  o Nose  o :   § Intranasal Exam  § : nares patent  o Oral Cavity  o :   § Oral Mucosa  § : moist mucous membranes  · Respiratory  o Respiratory  o : breathing comfortably, symmetric chest rise, clear to asculatation bilaterally, no wheezes, rales, or rhonchii  · Cardiovascular  o Heart  o :   § Auscultation of Heart  § : regular rate and rhythm, no murmurs, rubs, or gallops  o Peripheral Vascular System  o :   § Extremities  § : no " edema  · Lymphatic  o Neck  o : no lymphadenopathy present  o Supraclavicular Nodes  o : no supraclavicular nodes  · Skin and Subcutaneous Tissue  o General Inspection  o : no lesions present, no areas of discoloration, skin turgor normal  · Neurologic  o Gait and Station  o :   § Gait Screening  § : normal gait  · Psychiatric  o General  o : normal mood, flat affect          Assessment  · Anxiety disorder     300.00/F41.9  · Depression     311/F32.9  · Fatigue     780.79/R53.83  · Weakness     780.79/R53.1  · Screening for lipid disorders     V77.91/Z13.220  · Screening for depression     V79.0/Z13.31       Fatigue/weakiness:  Order for labs    Anxiety/depression:  Uncontrolled  Discussed tx options  Referral to new psychiatrist, Sharlene Olmos.   Continue all medications as prescribed  Proceed to ED, [facility], [facility] if S/S worsen or become severe or thoughts of suicide or self-harm  Pt has had Gene Sight testing in the past, advised to bring results to visit with Sharlene.     Problems Reconciled  Plan  · Orders  o Psychiatric Consultation (PSYCH) - 300.00/F41.9 - 10/28/2020   Sharlene Olmos, PMHNP  o CBC with Auto Diff [facility] (19462) - 780.79/R53.83 - 10/28/2020  o CMP [facility] (64732) - 780.79/R53.83 - 10/28/2020  o Iron panel (iron, TIBC, transferrin saturation) (95746, 70984, 73014) - 780.79/R53.83 - 10/28/2020  o Thyroid Profile (81643, 28819, THYII) - 780.79/R53.83 - 10/28/2020  o B12 Folate levels (B12FO) - 780.79/R53.83 - 10/28/2020  o Lipid Panel [facility] (44079) - V77.91/Z13.220 - 10/28/2020  o ACO-39: Current medications updated and reviewed (1159F, ) - - 10/28/2020  o ACO-18: Positive screen for clinical depression using a standardized tool and a follow-up plan documented () - - 10/28/2020  · Instructions  o Discussed the need for therapy, either with a certified counselor, psychologist, and/or family . If no improvement is noted or worsening of their condition, return to office or ER. But also discussed with patient  "that if they are non-responsive to the type of medication they may need to see a psychiatrist for further evaluation and management.  o Patient was given an SSRI/SSNRI medication and warned of possible side effects of the medication including potential for increased risk of suicidal thoughts and feelings. Patient was instructed that if they begin to exhibit any of these effects they will discontinue the medication immediately and contact our office or the ER ASAP.  o Patient agrees to a \"No Self Harm\" contract. Patient will either call us, 911, , [facility], [facility].  o Discussed the need for therapy, either with a certified counselor, psychologist, and/or family . If no improvement is noted or worsening of their condition, return to office or ER. But also discussed with patient that if they are non-responsive to the type of medication they may need to see a psychiatrist for further evaluation and management.  o Patient was given an SSRI/SSNRI medication and warned of possible side effects of the medication including potential for increased risk of suicidal thoughts and feelings. Patient was instructed that if they begin to exhibit any of these effects they will discontinue the medication immediately and contact our office or the ER [facility].  o Patient agrees to a \"No Self Harm\" contract. Patient will either call us, 911, ER, [facility], [facility].  o Take all medications as prescribed/directed.  o Patient was educated/instructed on their diagnosis, treatment and medications prior to discharge from the clinic today.  o Patient was instructed to exercise regularly.  o Patient instructed to seek medical attention urgently for new or worsening symptoms.  o Call the office with any concerns or questions.  o Bring all medicines with their bottles to each office visit.  o Risks, benefits, and alternatives were discussed with the patient. The patient " is aware of risks associated with: all meds and conditions.   o Chronic conditions reviewed and taken into consideration for today's treatment plan.  o Discussed Covid-19 precautions including, but not limited to, social distancing, avoid touching your face, and hand washing.   · Disposition  o Call or Return if symptoms worsen or persist.  o Follow Up PRN.  o Proceed to ED for all medical emergencies.  o Care Transition            Electronically Signed by: SHAQ Acosta -Author on October 28, 2020 02:46:44 PM

## 2021-05-14 VITALS
TEMPERATURE: 98 F | WEIGHT: 144.5 LBS | SYSTOLIC BLOOD PRESSURE: 119 MMHG | HEART RATE: 98 BPM | OXYGEN SATURATION: 99 % | RESPIRATION RATE: 20 BRPM | DIASTOLIC BLOOD PRESSURE: 74 MMHG | HEIGHT: 63 IN | BODY MASS INDEX: 25.6 KG/M2

## 2021-05-15 VITALS — WEIGHT: 140 LBS | BODY MASS INDEX: 24.8 KG/M2 | HEIGHT: 63 IN

## 2021-05-15 VITALS — WEIGHT: 140 LBS | HEIGHT: 63 IN | BODY MASS INDEX: 24.8 KG/M2

## 2021-05-15 VITALS — SYSTOLIC BLOOD PRESSURE: 121 MMHG | HEART RATE: 91 BPM | DIASTOLIC BLOOD PRESSURE: 78 MMHG

## 2021-05-15 VITALS — BODY MASS INDEX: 24.85 KG/M2 | HEIGHT: 63 IN | HEART RATE: 74 BPM | OXYGEN SATURATION: 98 % | WEIGHT: 140.25 LBS

## 2021-05-15 VITALS — HEIGHT: 63 IN | OXYGEN SATURATION: 97 % | WEIGHT: 140 LBS | BODY MASS INDEX: 24.8 KG/M2 | HEART RATE: 76 BPM

## 2021-05-15 VITALS — BODY MASS INDEX: 25 KG/M2 | WEIGHT: 141.12 LBS | HEART RATE: 77 BPM | HEIGHT: 63 IN | OXYGEN SATURATION: 98 %

## 2021-06-03 ENCOUNTER — HOSPITAL ENCOUNTER (OUTPATIENT)
Dept: GENERAL RADIOLOGY | Facility: HOSPITAL | Age: 66
Discharge: HOME | End: 2021-06-03

## 2021-06-03 LAB
25(OH)D3 SERPL-MCNC: 36.8 NG/ML (ref 30–100)
ALBUMIN SERPL-MCNC: 4.7 G/DL (ref 3.5–5)
ALBUMIN/GLOB SERPL: 1.5 {RATIO} (ref 1.4–2.6)
ALP SERPL-CCNC: 81 U/L (ref 43–160)
ALT SERPL-CCNC: 19 U/L (ref 10–40)
ANION GAP SERPL CALC-SCNC: 17 MMOL/L (ref 8–19)
AST SERPL-CCNC: 24 U/L (ref 15–50)
BASOPHILS # BLD AUTO: 0.06 10*3/UL (ref 0–0.2)
BASOPHILS NFR BLD AUTO: 1 % (ref 0–3)
BILIRUB SERPL-MCNC: 0.41 MG/DL (ref 0.2–1.3)
BUN SERPL-MCNC: 11 MG/DL (ref 5–25)
BUN/CREAT SERPL: 12 {RATIO} (ref 6–20)
CALCIUM SERPL-MCNC: 9.5 MG/DL (ref 8.7–10.4)
CHLORIDE SERPL-SCNC: 101 MMOL/L (ref 99–111)
CHOLEST SERPL-MCNC: 216 MG/DL (ref 107–200)
CHOLEST/HDLC SERPL: 3.2 {RATIO} (ref 3–6)
CONV ABS IMM GRAN: 0.02 10*3/UL (ref 0–0.2)
CONV CO2: 26 MMOL/L (ref 22–32)
CONV IMMATURE GRAN: 0.3 % (ref 0–1.8)
CONV TOTAL PROTEIN: 7.8 G/DL (ref 6.3–8.2)
CREAT UR-MCNC: 0.9 MG/DL (ref 0.5–0.9)
DEPRECATED RDW RBC AUTO: 44.8 FL (ref 36.4–46.3)
EOSINOPHIL # BLD AUTO: 0.2 10*3/UL (ref 0–0.7)
EOSINOPHIL # BLD AUTO: 3.2 % (ref 0–7)
ERYTHROCYTE [DISTWIDTH] IN BLOOD BY AUTOMATED COUNT: 12.8 % (ref 11.7–14.4)
EST. AVERAGE GLUCOSE BLD GHB EST-MCNC: 111 MG/DL
FOLATE SERPL-MCNC: >20 NG/ML (ref 4.8–20)
GFR SERPLBLD BASED ON 1.73 SQ M-ARVRAT: >60 ML/MIN/{1.73_M2}
GLOBULIN UR ELPH-MCNC: 3.1 G/DL (ref 2–3.5)
GLUCOSE SERPL-MCNC: 101 MG/DL (ref 65–99)
HBA1C MFR BLD: 5.5 % (ref 3.5–5.7)
HCT VFR BLD AUTO: 38.1 % (ref 37–47)
HDLC SERPL-MCNC: 68 MG/DL (ref 40–60)
HGB BLD-MCNC: 12.1 G/DL (ref 12–16)
LDLC SERPL CALC-MCNC: 127 MG/DL (ref 70–100)
LYMPHOCYTES # BLD AUTO: 1.76 10*3/UL (ref 1–5)
LYMPHOCYTES NFR BLD AUTO: 28.3 % (ref 20–45)
MCH RBC QN AUTO: 30.2 PG (ref 27–31)
MCHC RBC AUTO-ENTMCNC: 31.8 G/DL (ref 33–37)
MCV RBC AUTO: 95 FL (ref 81–99)
MONOCYTES # BLD AUTO: 0.51 10*3/UL (ref 0.2–1.2)
MONOCYTES NFR BLD AUTO: 8.2 % (ref 3–10)
NEUTROPHILS # BLD AUTO: 3.68 10*3/UL (ref 2–8)
NEUTROPHILS NFR BLD AUTO: 59 % (ref 30–85)
NRBC CBCN: 0 % (ref 0–0.7)
OSMOLALITY SERPL CALC.SUM OF ELEC: 290 MOSM/KG (ref 273–304)
PLATELET # BLD AUTO: 218 10*3/UL (ref 130–400)
PMV BLD AUTO: 12.1 FL (ref 9.4–12.3)
POTASSIUM SERPL-SCNC: 4.3 MMOL/L (ref 3.5–5.3)
RBC # BLD AUTO: 4.01 10*6/UL (ref 4.2–5.4)
SODIUM SERPL-SCNC: 140 MMOL/L (ref 135–147)
T4 FREE SERPL-MCNC: 1.1 NG/DL (ref 0.9–1.8)
TRIGL SERPL-MCNC: 106 MG/DL (ref 40–150)
TSH SERPL-ACNC: 1.63 M[IU]/L (ref 0.27–4.2)
VIT B12 SERPL-MCNC: 695 PG/ML (ref 211–911)
VLDLC SERPL-MCNC: 21 MG/DL (ref 5–37)
WBC # BLD AUTO: 6.23 10*3/UL (ref 4.8–10.8)

## 2021-06-04 LAB — T3FREE SERPL-MCNC: 3 PG/ML (ref 2–4.4)

## 2022-12-21 NOTE — PATIENT INSTRUCTIONS
1.  Please return to clinic at your next scheduled visit.  Contact the clinic ([phone]) at least 24 hours prior in the event you need to cancel.  2.  Do no harm to yourself or others.    3.  Avoid alcohol and drugs.    4.  Take all medications as prescribed.  Please contact the clinic with any concerns. If you are in need of medication refills, please call the clinic at [phone].    5. Should you want to get in touch with your provider, Dr. Kai Elliott, please utilize [employer] or contact the office ([phone]), and staff will be able to page Dr. Elliott directly.  6.  In the event you have personal crisis, contact the following crisis numbers: Suicide Prevention Hotline 1-846.987.4412; TREASURE Helpline 5-789-568-RGSH; [facility] Emergency Room [phone]; text HELLO to 344704; or 031.     SPECIFIC RECOMMENDATIONS:     1.      Medications discussed at this encounter:                   -Start Abilify     2.      Psychotherapy recommendations:      3.     Return to clinic: 4 weeks

## 2022-12-21 NOTE — PROGRESS NOTES
"Subjective   Alissa Bruce is a 67 y.o. female who presents today for initial evaluation     Referring Provider:  Delfina Quevedo, [facility]  [address] [place] [address]    Chief Complaint: Anxiety    History of Present Illness:     12/21: Chart review: Patient has a history of anxiety for several years, followed by [facility] during this time for medication management with little success.  Sent to us for a second opinion.  On Wellbutrin  twice daily, doxepin 25 nightly, Klonopin 1 mg p.o. 3 times daily as needed, but she takes about 1-1/2 tabs daily.  Nothing in PDMP.  No Care Everywhere.  Older labs from June 2021 show reassuring CMP, thyroid studies, folate, iron studies, elevated lipids, reassuring B12, vitamin D, CBC.  No head imaging, EKG.    Psychotropic medication review includes Ativan, Vraylar, mirtazapine, Seroquel XR, Celexa, Cymbalta, Latuda, Adderall XR, Rexulti, Prozac, Abilify, Lamictal, BuSpar, Zoloft, Lexapro, Effexor.  There may have been others prior to [facility] management.    \"Alissa\" and \"Feng\"     12/22: In person.  Interview:  1. Chart review:   a. Would rule out ADHD.  b. Would rule out PTSD, consider guanfacine, clonidine, prazosin.  2. His/Her Story: \"I was working with Delfina Quevedo.\"  a. P15, G14  b. Had been working with Kamilah Dee also, and Dr. Rodrigo painting I never even saw Delfina -- maybe 2 years  d. Talks over me, talkative, but interruptible, long silences too  e. One day I woke up and \"something wasn't right\" 5 years ago  f. Didn't like the ativan  g. 6 mos I was perfectly fine: 2018 (jan to June)?  h. 2017, multiple losses, when it all began  i. Discussed how klonopin is addictive  j. Everything that I do \"excites me\"  k. Most of the day I watch TV  3. Depression/Mood:  a. Depressed mood, anhedonia, hopelessness, poor energy, poor concentration, weight loss or weight gain, psychomotor retardation or agitation, insomnia controlled on clonazepam.  b. Seasonal " "pattern:  c. Severity: Moderate  d. Duration: 5 years ago  4. Anxiety:  a. Uncontrolled worrying, muscle tension, fatigue, poor concentration, feeling on edge, irritability, insomnia.  b. Severity: Moderate to severe  c. Duration: 5 years ago  5. Panic attacks: n  6. Psych ROS: Positive for depression, anxiety.  Negative for psychosis and williams.  a. ADHD: Deferred  b. PTSD: Deferred  7. No SI HI AVH.  8. Substances: No  9. Therapy: Briefly  10. Medication compliant: y    Access to Firearms: Yes, locked away    PHQ-9 Depression Screening  PHQ-9 Total Score: 15    Little interest or pleasure in doing things? 3-->nearly every day   Feeling down, depressed, or hopeless? 2-->more than half the days   Trouble falling or staying asleep, or sleeping too much? 1-->several days (\"TAKE SLEEPING PILL\")   Feeling tired or having little energy? 3-->nearly every day   Poor appetite or overeating? 3-->nearly every day   Feeling bad about yourself - or that you are a failure or have let yourself or your family down? 2-->more than half the days   Trouble concentrating on things, such as reading the newspaper or watching television? 1-->several days   Moving or speaking so slowly that other people could have noticed? Or the opposite - being so fidgety or restless that you have been moving around a lot more than usual? 0-->not at all   Thoughts that you would be better off dead, or of hurting yourself in some way? 0-->not at all   PHQ-9 Total Score 15     VITOR-7  Feeling nervous, anxious or on edge: More than half the days  Not being able to stop or control worrying: More than half the days  Worrying too much about different things: More than half the days  Trouble Relaxing: More than half the days  Being so restless that it is hard to sit still: More than half the days  Feeling afraid as if something awful might happen: More than half the days  Becoming easily annoyed or irritable: More than half the days  VITOR 7 Total Score: 14  If you " checked any problems, how difficult have these problems made it for you to do your work, take care of things at home, or get along with other people: Very difficult    Past Surgical History:  History reviewed. No pertinent surgical history.    Problem List:  Patient Active Problem List   Diagnosis   • Depression with anxiety   • Hip pain       Allergy:   Allergies   Allergen Reactions   • Sulfa Antibiotics Unknown - Low Severity        Discontinued Medications:  Medications Discontinued During This Encounter   Medication Reason   • Brexpiprazole (Rexulti) 2 MG tablet Not Efficacious   • LORazepam (ATIVAN) 1 MG tablet Not Efficacious   • mirtazapine (REMERON) 15 MG tablet Not Efficacious   • OXcarbazepine (TRILEPTAL) 150 MG tablet Not Efficacious   • risperiDONE (risperDAL) 1 MG tablet Not Efficacious   • sertraline (ZOLOFT) 100 MG tablet Not Efficacious   • ARIPiprazole (Abilify) 2 MG tablet Reorder       Current Medications:   Current Outpatient Medications   Medication Sig Dispense Refill   • [START ON 2023] ARIPiprazole (Abilify) 2 MG tablet Take 1 tablet by mouth Daily. 30 tablet 2   • buPROPion SR (WELLBUTRIN SR) 100 MG 12 hr tablet Take 100 mg by mouth 2 (Two) Times a Day.     • clonazePAM (KlonoPIN) 1 MG tablet Take 1 mg by mouth 3 (Three) Times a Day As Needed.     • doxepin (SINEquan) 25 MG capsule Take 25 mg by mouth Every Night.       No current facility-administered medications for this visit.       Past Medical History:  Past Medical History:   Diagnosis Date   • Anxiety    • Depression        Past Psychiatric History:  Began Treatment: a few years ago  Diagnoses: dep and anx, bipolar 2  Psychiatrist: [facility]  Therapist: yes briefly  Admission History:Denies  Medication Trials:    Multiple, can't remember inidivdual effects    Only one that helped me get back to my old self was zoloft during those six months    Self Harm: Denies  Suicide Attempts:Denies   Psychosis, Anxiety, Depression:  "Denies    Substance Abuse History:   Types:Denies all, including illicit  Withdrawal Symptoms:Denies  Longest Period Sober:Not Applicable   AA: Not applicable     Social History:  Martial Status:  Employed:No  Kids:Yes  House:Lives in a house   History: Denies    Social History     Socioeconomic History   • Marital status:    Tobacco Use   • Smoking status: Never   • Smokeless tobacco: Never   Vaping Use   • Vaping Use: Never used   Substance and Sexual Activity   • Alcohol use: Never   • Drug use: Never   • Sexual activity: Defer       Family History:   Suicide Attempts: Denies  Suicide Completions:Denies      Family History   Problem Relation Age of Onset   • Bipolar disorder Sister        Developmental History:       Childhood: Denies Abuse  High School:Completed  College: denies    · Mental Status Exam  · Appearance  · : groomed, good eye contact, normal street clothes  · Behavior  · : pleasant and cooperative  · Motor  · : No abnormal  · Speech  · : talkative, normal rhythm, rate, volume, tone, not hyperverbal, not pressured, normal prosidy  · Mood  · : \"  · Affect  · : euthymic, mood congruent, good variability  · Thought Content  · : negative suicidal ideations, negative homicidal ideations, negative obsessions  · Perceptions  · : negative auditory hallucinations, negative visual hallucinations  · Thought Process  · : tangential  · Insight/Judgement  · : Fair/fair  · Cognition  · : grossly intact  · Attention   : intact      Review of Systems:  Review of Systems   Constitutional: Positive for fatigue. Negative for diaphoresis.   HENT: Negative for drooling.    Eyes: Negative for visual disturbance.   Respiratory: Negative for cough and shortness of breath.    Cardiovascular: Negative for chest pain, palpitations and leg swelling.   Gastrointestinal: Negative for nausea and vomiting.   Endocrine: Negative for cold intolerance and heat intolerance.   Genitourinary: Negative for difficulty " "urinating.   Musculoskeletal: Negative for joint swelling.   Allergic/Immunologic: Negative for immunocompromised state.   Neurological: Negative for dizziness, seizures, speech difficulty and numbness.       Physical Exam:  Physical Exam    Vital Signs:   /66   Pulse 76   Ht 160 cm (63\")   Wt 62.9 kg (138 lb 9.6 oz)   BMI 24.55 kg/m²      Lab Results:   No visits with results within 6 Month(s) from this visit.   Latest known visit with results is:   Hospital Outpatient Visit on 06/03/2021   Component Date Value Ref Range Status   • WBC 06/03/2021 6.23  4.80 - 10.80 10*3/uL Final   • RBC 06/03/2021 4.01 (L)  4.20 - 5.40 10*6/uL Final   • Hemoglobin 06/03/2021 12.1  12.0 - 16.0 g/dL Final   • Hematocrit 06/03/2021 38.1  37.0 - 47.0 % Final   • MCV 06/03/2021 95.0  81.0 - 99.0 fL Final   • MCH 06/03/2021 30.2  27.0 - 31.0 pg Final   • MCHC 06/03/2021 31.8 (L)  33.0 - 37.0 Final   • RDW 06/03/2021 12.8  11.7 - 14.4 % Final   • Platelets 06/03/2021 218  130 - 400 10*3/uL Final   • MPV 06/03/2021 12.1  9.4 - 12.3 fL Final   • Neutrophil Rel % 06/03/2021 59.0  30.0 - 85.0 % Final   • Lymphocyte Rel % 06/03/2021 28.3  20.0 - 45.0 % Final   • Monocyte Rel % 06/03/2021 8.2  3.0 - 10.0 % Final   • Eosinophil Rel % 06/03/2021 3.2  0.0 - 7.0 % Final   • Basophil Rel % 06/03/2021 1.0  0.0 - 3.0 % Final   • Neutrophils Absolute 06/03/2021 3.68  2.00 - 8.00 10*3/uL Final   • Lymphocytes Absolute 06/03/2021 1.76  1.00 - 5.00 10*3/uL Final   • Monocytes Absolute 06/03/2021 0.51  0.20 - 1.20 10*3/uL Final   • Eosinophils Absolute 06/03/2021 0.20  0.00 - 0.70 10*3/uL Final   • Basophils Absolute 06/03/2021 0.06  0.00 - 0.20 10*3/uL Final   • Immature Granulocyte Rel % 06/03/2021 0.3  0.0 - 1.8 % Final   • Abs Imm Gran 06/03/2021 0.02  0.00 - 0.20 10*3/uL Final   • RDW-SD 06/03/2021 44.8  36.4 - 46.3 fL Final   • NRBC 06/03/2021 0.00  0.00 - 0.70 % Final   • Hemoglobin A1C 06/03/2021 5.5  3.5 - 5.7 % Final    Comment: " **Interpretation**  <7% in Controlled Diabetic Patients.  Assay Range 3.4-18.2%  ADA 2010 Standards of Medical Care in Diabetes suggest using  a cut point of >= 6.5% for diagnosis of diabetes and an A1C  range of 5.7%-6.4% as a category of increased risk for future  diabetes.     • Mean Bld Glu Estim. 06/03/2021 111  mg/dL Final   • Free T4 06/03/2021 1.1  0.9 - 1.8 ng/dL Final   • TSH 06/03/2021 1.630  0.270 - 4.200 m[iU]/L Final   • Triglycerides 06/03/2021 106  40 - 150 mg/dL Final    Comment: <150 mg/dL-Normal  150-199 mg/dL-Borderline High  200-499 mg/dL-High  >500 mg/dL- Very High     • Total Cholesterol 06/03/2021 216 (H)  107 - 200 mg/dL Final    Comment: <200 mg/dL-Desirable  200-239 mg/dL-Borderline High  >240 mg/dL-High  >500 mg/dL-Very High     • HDL Cholesterol 06/03/2021 68 (H)  40 - 60 mg/dL Final    Comment: <40 mg/dL-Low  >60 mg/dL- Desirable     • Chol/HDL Ratio 06/03/2021 3.2  3.0 - 6.0 NA Final   • LDL Cholesterol  06/03/2021 127 (H)  70 - 100 mg/dL Final    Comment: Recommended  LDL Levels  <100 mg/dL-Optimal  100-129 mg/dL-Near Optimal/above optimal  130-159 mg/dL-Borderline High  160-189 mg/dL-High  >190 mg/dL-Very High     • VLDL Cholesterol 06/03/2021 21  5 - 37 mg/dL Final   • Glucose 06/03/2021 101 (H)  65 - 99 mg/dL Final   • BUN 06/03/2021 11  5 - 25 mg/dL Final   • Creatinine 06/03/2021 0.90  0.50 - 0.90 mg/dL Final   • BUN/Creatinine Ratio 06/03/2021 12  6 - 20 [ratio] Final   • GFR 06/03/2021 >60  >60 mL/min/[1.73_m2] Final    Comment: Interpretative Data:  ------------------------------------  STAGE                  GFR  Stage 1                90 mL/min or greater  Stage 2                60-89 mL/min  Stage 3                30-59 mL/min  Stage 4                15-29 mL/min  Value <60 mL/min for 3 or more months is defined as CKD.     • Sodium 06/03/2021 140  135 - 147 mmol/L Final   • Potassium 06/03/2021 4.3  3.5 - 5.3 mmol/L Final   • Chloride 06/03/2021 101  99 - 111 mmol/L Final    • CO2 06/03/2021 26  22 - 32 mmol/L Final   • Anion Gap 06/03/2021 17  8 - 19 mmol/L Final   • OSMOLALITY CALC 06/03/2021 290  273 - 304 Final   • Total Protein 06/03/2021 7.8  6.3 - 8.2 g/dL Final    Comment: If Patient is receiving dextran as a blood volume expander  result may show a potential interference.     • Albumin 06/03/2021 4.7  3.5 - 5.0 g/dL Final   • Globulin 06/03/2021 3.1  2.0 - 3.5 g/dL Final   • A/G Ratio 06/03/2021 1.5  1.4 - 2.6 [ratio] Final   • Calcium 06/03/2021 9.5  8.7 - 10.4 mg/dL Final   • Alkaline Phosphatase 06/03/2021 81  43 - 160 U/L Final   • ALT (SGPT) 06/03/2021 19  10 - 40 U/L Final   • AST (SGOT) 06/03/2021 24  15 - 50 U/L Final   • Total Bilirubin 06/03/2021 0.41  0.20 - 1.30 mg/dL Final   • 25 Hydroxy, Vitamin D 06/03/2021 36.8  30.0 - 100.0 ng/mL Final    Comment: Vitamin D Status          Range  ---------------------------------------  Deficiency                <10 ng/mL  Insufficiency             10-30 ng/mL  Sufficiency                ng/mL  Toxicity                  >100 ng/mL     • Vitamin B-12 06/03/2021 695  211 - 911 pg/mL Final   • Folate 06/03/2021 >20.0  4.8 - 20.0 ng/mL Final    Comment: Results may be falsely decreased due to light exposure if  testing added on after collection.     • T3, Free 06/03/2021 3.0  2.0 - 4.4 pg/mL Final       EKG Results:  No orders to display       Imaging Results:  No Images in the past 120 days found..      Assessment & Plan   Diagnoses and all orders for this visit:    1. Major depressive disorder, recurrent episode, moderate (HCC) (Primary)  -     Discontinue: ARIPiprazole (Abilify) 2 MG tablet; Take 1 tablet by mouth Daily.  Dispense: 30 tablet; Refill: 2  -     ARIPiprazole (Abilify) 2 MG tablet; Take 1 tablet by mouth Daily.  Dispense: 30 tablet; Refill: 2    2. Generalized anxiety disorder    3. Insomnia due to mental condition  -     Discontinue: ARIPiprazole (Abilify) 2 MG tablet; Take 1 tablet by mouth Daily.   Dispense: 30 tablet; Refill: 2  -     ARIPiprazole (Abilify) 2 MG tablet; Take 1 tablet by mouth Daily.  Dispense: 30 tablet; Refill: 2        Visit Diagnoses:    ICD-10-CM ICD-9-CM   1. Major depressive disorder, recurrent episode, moderate (HCC)  F33.1 296.32   2. Generalized anxiety disorder  F41.1 300.02   3. Insomnia due to mental condition  F51.05 300.9     327.02     12/22: Very talkative and tangential, hard to get anything out of the patient.  EKG with answering questions, avoids saying yes or no to yes or no questions.  Start Abilify and see back in 4 weeks.  Request records from [employer].  Unclear what the diagnosis is but it could be bipolar.    PLAN:  11. Safety: No acute safety concerns  12. Therapy: None  13. Risk Assessment: Risk of self-harm acutely is moderate.  Risk factors include anxiety disorder, mood disorder, access to weapons and recent psychosocial stressors (pandemic). Protective factors include no family history, no present SI, no history of suicide attempts or self-harm in the past, minimal AODA, healthcare seeking, future orientation, willingness to engage in care.  Risk of self-harm chronically is also moderate, but could be further elevated in the event of treatment noncompliance and/or AODA.  14. Meds:  a. START Abilify 2 mg nightly. Risks, benefits, alternatives discussed with patient including increased energy, exacerbation of irritability, akathisia, GI upset, orthostatic hypotension, increased appetite, tardive dyskinesia.  After discussion of these risks and benefits, the patient voiced understanding and agreed to proceed.  b. CONTINUE Wellbutrin, clonazepam, doxepin.  15. Labs: None  16. Follow up: 4 weeks, urgent    Patient screened positive for depression based on a PHQ-9 score of  on . Follow-up recommendations include: Prescribed antidepressant medication treatment and Suicide Risk Assessment performed.           TREATMENT PLAN/GOALS: Continue supportive psychotherapy efforts  and medications as indicated. Treatment and medication options discussed during today's visit. Patient acknowledged and verbally consented to continue with current treatment plan and was educated on the importance of compliance with treatment and follow-up appointments.    MEDICATION ISSUES:  BRYNN reviewed as expected.  Discussed medication options and treatment plan of prescribed medication as well as the risks, benefits, and side effects including potential falls, possible impaired driving and metabolic adversities among others. Patient is agreeable to call the office with any worsening of symptoms or onset of side effects. Patient is agreeable to call 911 or go to the nearest ER should he/she begin having SI/HI. No medication side effects or related complaints today.     MEDS ORDERED DURING VISIT:  New Medications Ordered This Visit   Medications   • ARIPiprazole (Abilify) 2 MG tablet     Sig: Take 1 tablet by mouth Daily.     Dispense:  30 tablet     Refill:  2       Return in about 4 weeks (around 1/19/2023).         This document has been electronically signed by Kai Elliott MD  December 22, 2022 14:54 EST    Dictated Utilizing Dragon Dictation: Part of this note may be an electronic transcription/translation of spoken language to printed text using the Dragon Dictation System.

## 2022-12-22 ENCOUNTER — OFFICE VISIT (OUTPATIENT)
Dept: PSYCHIATRY | Facility: CLINIC | Age: 67
End: 2022-12-22

## 2022-12-22 VITALS
HEART RATE: 76 BPM | SYSTOLIC BLOOD PRESSURE: 109 MMHG | HEIGHT: 63 IN | DIASTOLIC BLOOD PRESSURE: 66 MMHG | WEIGHT: 138.6 LBS | BODY MASS INDEX: 24.56 KG/M2

## 2022-12-22 DIAGNOSIS — F33.1 MAJOR DEPRESSIVE DISORDER, RECURRENT EPISODE, MODERATE: Primary | ICD-10-CM

## 2022-12-22 DIAGNOSIS — F41.1 GENERALIZED ANXIETY DISORDER: ICD-10-CM

## 2022-12-22 DIAGNOSIS — F51.05 INSOMNIA DUE TO MENTAL CONDITION: ICD-10-CM

## 2022-12-22 PROBLEM — M25.559 HIP PAIN: Status: ACTIVE | Noted: 2022-12-22

## 2022-12-22 PROBLEM — F41.8 DEPRESSION WITH ANXIETY: Status: ACTIVE | Noted: 2022-12-22

## 2022-12-22 PROCEDURE — 90792 PSYCH DIAG EVAL W/MED SRVCS: CPT | Performed by: STUDENT IN AN ORGANIZED HEALTH CARE EDUCATION/TRAINING PROGRAM

## 2022-12-22 RX ORDER — CLONAZEPAM 1 MG/1
1 TABLET ORAL 3 TIMES DAILY PRN
COMMUNITY
End: 2023-01-17 | Stop reason: SDUPTHER

## 2022-12-22 RX ORDER — SERTRALINE HYDROCHLORIDE 100 MG/1
TABLET, FILM COATED ORAL
COMMUNITY
End: 2022-12-22

## 2022-12-22 RX ORDER — MIRTAZAPINE 15 MG/1
TABLET, FILM COATED ORAL
COMMUNITY
End: 2022-12-22

## 2022-12-22 RX ORDER — LORAZEPAM 1 MG/1
TABLET ORAL
COMMUNITY
End: 2022-12-22

## 2022-12-22 RX ORDER — ARIPIPRAZOLE 2 MG/1
2 TABLET ORAL DAILY
Qty: 30 TABLET | Refills: 2 | Status: SHIPPED | OUTPATIENT
Start: 2022-12-22 | End: 2022-12-22 | Stop reason: SDUPTHER

## 2022-12-22 RX ORDER — DOXEPIN HYDROCHLORIDE 25 MG/1
25 CAPSULE ORAL NIGHTLY
COMMUNITY
End: 2023-02-22

## 2022-12-22 RX ORDER — RISPERIDONE 1 MG/1
TABLET ORAL
COMMUNITY
End: 2022-12-22

## 2022-12-22 RX ORDER — BREXPIPRAZOLE 2 MG/1
TABLET ORAL
COMMUNITY
End: 2022-12-22

## 2022-12-22 RX ORDER — BUPROPION HYDROCHLORIDE 100 MG/1
100 TABLET, EXTENDED RELEASE ORAL 2 TIMES DAILY
COMMUNITY
End: 2023-02-22

## 2022-12-22 RX ORDER — ARIPIPRAZOLE 2 MG/1
2 TABLET ORAL DAILY
Qty: 30 TABLET | Refills: 2 | Status: SHIPPED | OUTPATIENT
Start: 2023-01-01 | End: 2023-01-17 | Stop reason: SDUPTHER

## 2022-12-22 RX ORDER — OXCARBAZEPINE 150 MG/1
TABLET, FILM COATED ORAL
COMMUNITY
End: 2022-12-22

## 2022-12-22 NOTE — TREATMENT PLAN
Multi-Disciplinary Problems (from Behavioral Health Treatment Plan)    Active Problems     Problem: Anxiety  Start Date: 12/22/22    Problem Details: The patient self-scales this problem as a 8 with 10 being the worst.        Goal Priority Start Date Expected End Date End Date    Patient will develop and implement behavioral and cognitive strategies to reduce anxiety and irrational fears. -- 12/22/22 -- --    Goal Details: Progress toward goal:  Not appropriate to rate progress toward goal since this is the initial treatment plan.        Goal Intervention Frequency Start Date End Date    Help patient explore past emotional issues in relation to present anxiety. Q Month 12/22/22 --    Intervention Details: Duration of treatment until until remission of symptoms.        Goal Intervention Frequency Start Date End Date    Help patient develop an awareness of their cognitive and physical responses to anxiety. Q Month 12/22/22 --    Intervention Details: Duration of treatment until until remission of symptoms.              Problem: Depression  Start Date: 12/22/22    Problem Details: The patient self-scales this problem as a 8 with 10 being the worst.        Goal Priority Start Date Expected End Date End Date    Patient will demonstrate the ability to initiate new constructive life skills outside of sessions on a consistent basis. -- 12/22/22 -- --    Goal Details: Progress toward goal:  Not appropriate to rate progress toward goal since this is the initial treatment plan.        Goal Intervention Frequency Start Date End Date    Assist patient in setting attainable activities of daily living goals. PRN 12/22/22 --    Goal Intervention Frequency Start Date End Date    Provide education about depression Q Month 12/22/22 --    Intervention Details: Duration of treatment until until remission of symptoms.        Goal Intervention Frequency Start Date End Date    Assist patient in developing healthy coping strategies. Q Month  12/22/22 --    Intervention Details: Duration of treatment until until remission of symptoms.                    Reviewed By     Kai Elliott MD 12/22/22 5931                 I have discussed and reviewed this treatment plan with the patient.

## 2022-12-30 ENCOUNTER — TELEPHONE (OUTPATIENT)
Dept: PSYCHIATRY | Facility: CLINIC | Age: 67
End: 2022-12-30

## 2022-12-30 NOTE — TELEPHONE ENCOUNTER
PT(PATIENT) VERIFIED      ARIPiprazole (Abilify) 2 MG tablet (2023)     PT(PATIENT) STATES MEDICATION IS NEW FOR HER AND SHE IS UNSURE OF THE BEST TIME OF DAY TO TAKE IT     PT(PATIENT) STATES SHE IS CONCERNED REGARDING DROWSINESS     PT(PATIENT) ADVISED MEDICATION CAN CAUSE DROWSINESS AND IT MAY BE BEST TO TAKE AT BEDTIME     PT(PATIENT) VERBALIZED UNDERSTANDING

## 2023-01-17 ENCOUNTER — OFFICE VISIT (OUTPATIENT)
Dept: PSYCHIATRY | Facility: CLINIC | Age: 68
End: 2023-01-17
Payer: MEDICARE

## 2023-01-17 VITALS
DIASTOLIC BLOOD PRESSURE: 82 MMHG | BODY MASS INDEX: 25.12 KG/M2 | HEIGHT: 63 IN | SYSTOLIC BLOOD PRESSURE: 134 MMHG | WEIGHT: 141.8 LBS | HEART RATE: 91 BPM

## 2023-01-17 DIAGNOSIS — F51.05 INSOMNIA DUE TO MENTAL CONDITION: ICD-10-CM

## 2023-01-17 DIAGNOSIS — F33.1 MAJOR DEPRESSIVE DISORDER, RECURRENT EPISODE, MODERATE: Primary | ICD-10-CM

## 2023-01-17 DIAGNOSIS — F41.1 GENERALIZED ANXIETY DISORDER: ICD-10-CM

## 2023-01-17 PROCEDURE — 99214 OFFICE O/P EST MOD 30 MIN: CPT | Performed by: STUDENT IN AN ORGANIZED HEALTH CARE EDUCATION/TRAINING PROGRAM

## 2023-01-17 PROCEDURE — 90833 PSYTX W PT W E/M 30 MIN: CPT | Performed by: STUDENT IN AN ORGANIZED HEALTH CARE EDUCATION/TRAINING PROGRAM

## 2023-01-17 RX ORDER — ACETAMINOPHEN 500 MG
500 TABLET ORAL EVERY 6 HOURS PRN
COMMUNITY

## 2023-01-17 RX ORDER — POLYETHYLENE GLYCOL 3350 17 G/17G
17 POWDER, FOR SOLUTION ORAL DAILY
COMMUNITY

## 2023-01-17 RX ORDER — ARIPIPRAZOLE 5 MG/1
5 TABLET ORAL DAILY
Qty: 90 TABLET | Refills: 1 | Status: SHIPPED | OUTPATIENT
Start: 2023-01-17 | End: 2023-02-20

## 2023-01-17 RX ORDER — CLONAZEPAM 1 MG/1
1 TABLET ORAL 3 TIMES DAILY PRN
Qty: 60 TABLET | Refills: 5 | Status: SHIPPED | OUTPATIENT
Start: 2023-01-21 | End: 2023-02-20 | Stop reason: SDUPTHER

## 2023-01-17 NOTE — PROGRESS NOTES
"Subjective   Alissa Lyudmila Bruce is a 67 y.o. female who presents today for initial evaluation     Referring Provider:  Delfina Quevedo, SHAQ  [address] [place] [address]    Chief Complaint: Anxiety    History of Present Illness:     12/21: Chart review: Patient has a history of anxiety for several years, followed by [facility] during this time for medication management with little success.  Sent to us for a second opinion.  On Wellbutrin  twice daily, doxepin 25 nightly, Klonopin 1 mg p.o. 3 times daily as needed, but she takes about 1-1/2 tabs daily.  Nothing in PDMP.  No Care Everywhere.  Older labs from June 2021 show reassuring CMP, thyroid studies, folate, iron studies, elevated lipids, reassuring B12, vitamin D, CBC.  No head imaging, EKG.    Psychotropic medication review includes Ativan, Vraylar, mirtazapine, Seroquel XR, Celexa, Cymbalta, Latuda, Adderall XR, Rexulti, Prozac, Abilify, Lamictal, BuSpar, Zoloft, Lexapro, Effexor.  There may have been others prior to [facility] management.    \"Alissa\" and \"Feng\"   Most concerning symptom: anxiety  Consider auvelity, MAOIs    1/17: In person interview:  1. Chart review: No new.  2. Planning: Very difficult patient.  Started Abilify at last visit.  3. \"I couldn't tell much of a difference.\"  a. Records from Delfina Quevedo, but not from [facility], which have been requested.  b. Switched taking the abilify to the mornings.  c. Most concerning symptom is \"anxiety\"  d. Chronic constipation  4. Mood/Depression:  5. Anxiety: feel anxious  6. Panic attacks:  7. Energy: tired all the time, for a long time  a. Unsure if it is related to abilify  b. It's been 5 years  8. Concentration: not at goal  9. Sleeping:   a. On CPAP  10. Eating: 3 lb wg 12/22  11. Refills: y  12. Substances: n  13. Therapy: n  14. Medication compliant: y  a. Take klonopin 1.5 total daily  15. No SI HI AVH.      12/22: In person.  Interview:  16. Chart review:   a. Would rule out " "ADHD.  b. Would rule out PTSD, consider guanfacine, clonidine, prazosin.  17. His/Her Story: \"I was working with Delfina Quevedo.\"  a. P15, G14  b. Had been working with [facility] Dee also, and Dr. Rodrigo guerra. I never even saw Delfina -- maybe 2 years  d. Talks over me, talkative, but interruptible, long silences too  e. One day I woke up and \"something wasn't right\" 5 years ago  f. Didn't like the ativan  g. 6 mos I was perfectly fine: 2018 (jan to June)?  h. 2017, multiple losses, when it all began  i. Discussed how klonopin is addictive  j. Everything that I do \"excites me\"  k. Most of the day I watch TV  18. Depression/Mood:  a. Depressed mood, anhedonia, hopelessness, poor energy, poor concentration, weight loss or weight gain, psychomotor retardation or agitation, insomnia controlled on clonazepam.  b. Seasonal pattern:  c. Severity: Moderate  d. Duration: 5 years ago  19. Anxiety:  a. Uncontrolled worrying, muscle tension, fatigue, poor concentration, feeling on edge, irritability, insomnia.  b. Severity: Moderate to severe  c. Duration: 5 years ago  20. Panic attacks: n  21. Psych ROS: Positive for depression, anxiety.  Negative for psychosis and williams.  a. ADHD: Deferred  b. PTSD: Deferred  22. No SI HI AVH.  23. Substances: No  24. Therapy: Briefly  25. Medication compliant: y    Access to Firearms: Yes, locked away    PHQ-9 Depression Screening  PHQ-9 Total Score:      Little interest or pleasure in doing things?     Feeling down, depressed, or hopeless?     Trouble falling or staying asleep, or sleeping too much?     Feeling tired or having little energy?     Poor appetite or overeating?     Feeling bad about yourself - or that you are a failure or have let yourself or your family down?     Trouble concentrating on things, such as reading the newspaper or watching television?     Moving or speaking so slowly that other people could have noticed? Or the opposite - being so fidgety or restless that you have " been moving around a lot more than usual?     Thoughts that you would be better off dead, or of hurting yourself in some way?     PHQ-9 Total Score       VITOR-7       Past Surgical History:  Past Surgical History:   Procedure Laterality Date   • COLONOSCOPY  Age 52   • EYE SURGERY      Lasik to correct vision       Problem List:  Patient Active Problem List   Diagnosis   • Depression with anxiety   • Hip pain       Allergy:   Allergies   Allergen Reactions   • Sulfa Antibiotics Unknown - Low Severity        Discontinued Medications:  Medications Discontinued During This Encounter   Medication Reason   • clonazePAM (KlonoPIN) 1 MG tablet Reorder   • ARIPiprazole (Abilify) 2 MG tablet Reorder       Current Medications:   Current Outpatient Medications   Medication Sig Dispense Refill   • acetaminophen (TYLENOL) 500 MG tablet Take 500 mg by mouth Every 6 (Six) Hours As Needed for Mild Pain.     • ARIPiprazole (Abilify) 5 MG tablet Take 1 tablet by mouth Daily. 90 tablet 1   • buPROPion SR (WELLBUTRIN SR) 100 MG 12 hr tablet Take 100 mg by mouth 2 (Two) Times a Day.     • [START ON 2023] clonazePAM (KlonoPIN) 1 MG tablet Take 1 tablet by mouth 3 (Three) Times a Day As Needed for Anxiety. 60 tablet 5   • doxepin (SINEquan) 25 MG capsule Take 25 mg by mouth Every Night.     • polyethylene glycol (MiraLax) 17 g packet Take 17 g by mouth Daily.       No current facility-administered medications for this visit.       Past Medical History:  Past Medical History:   Diagnosis Date   • Anxiety    • Depression        Past Psychiatric History:  Began Treatment: a few years ago  Diagnoses: dep and anx, bipolar 2  Psychiatrist: [facility]  Therapist: yes briefly  Admission History:Denies  Medication Trials:    Multiple, can't remember inidivdual effects    Only one that helped me get back to my old self was zoloft during those six months    Self Harm: Denies  Suicide Attempts:Denies   Psychosis, Anxiety, Depression:  "Denies    Substance Abuse History:   Types:Denies all, including illicit  Withdrawal Symptoms:Denies  Longest Period Sober:Not Applicable   AA: Not applicable     Social History:  Martial Status:  Employed:No  Kids:Yes  House:Lives in a house   History: Denies    Social History     Socioeconomic History   • Marital status:    Tobacco Use   • Smoking status: Never   • Smokeless tobacco: Never   Vaping Use   • Vaping Use: Never used   Substance and Sexual Activity   • Alcohol use: Never   • Drug use: Never   • Sexual activity: Not Currently     Partners: Male     Birth control/protection: Tubal ligation       Family History:   Suicide Attempts: Denies  Suicide Completions:Denies      Family History   Problem Relation Age of Onset   • Bipolar disorder Sister         [id number] ()       Developmental History:       Childhood: Denies Abuse  High School:Completed  College: denies    · Mental Status Exam  · Appearance  · : groomed, good eye contact, normal street clothes  · Behavior  · : pleasant and cooperative  · Motor  · : No abnormal  · Speech  · : very talkative, some periods of silence, normal rhythm, rate, volume, tone, not hyperverbal, not pressured, normal prosidy  · Mood  · : \"about the same\"  · Affect  · : euthymic, mood congruent, good variability  · Thought Content  · : negative suicidal ideations, negative homicidal ideations, negative obsessions  · Perceptions  · : negative auditory hallucinations, negative visual hallucinations  · Thought Process  · : tangential still  · Insight/Judgement  · : Fair/fair  · Cognition  · : grossly intact  · Attention   : intact      Review of Systems:  Review of Systems   Constitutional: Positive for fatigue. Negative for diaphoresis.   HENT: Negative for drooling.    Eyes: Negative for visual disturbance.   Respiratory: Negative for cough and shortness of breath.    Cardiovascular: Negative for chest pain, palpitations and leg swelling. " "  Gastrointestinal: Positive for constipation. Negative for nausea and vomiting.   Endocrine: Negative for cold intolerance and heat intolerance.   Genitourinary: Negative for difficulty urinating.   Musculoskeletal: Negative for joint swelling.   Allergic/Immunologic: Negative for immunocompromised state.   Neurological: Negative for dizziness, seizures, speech difficulty and numbness.       Physical Exam:  Physical Exam    Vital Signs:   /82   Pulse 91   Ht 160 cm (63\")   Wt 64.3 kg (141 lb 12.8 oz)   BMI 25.12 kg/m²      Lab Results:   No visits with results within 6 Month(s) from this visit.   Latest known visit with results is:   Hospital Outpatient Visit on 06/03/2021   Component Date Value Ref Range Status   • WBC 06/03/2021 6.23  4.80 - 10.80 10*3/uL Final   • RBC 06/03/2021 4.01 (L)  4.20 - 5.40 10*6/uL Final   • Hemoglobin 06/03/2021 12.1  12.0 - 16.0 g/dL Final   • Hematocrit 06/03/2021 38.1  37.0 - 47.0 % Final   • MCV 06/03/2021 95.0  81.0 - 99.0 fL Final   • MCH 06/03/2021 30.2  27.0 - 31.0 pg Final   • MCHC 06/03/2021 31.8 (L)  33.0 - 37.0 Final   • RDW 06/03/2021 12.8  11.7 - 14.4 % Final   • Platelets 06/03/2021 218  130 - 400 10*3/uL Final   • MPV 06/03/2021 12.1  9.4 - 12.3 fL Final   • Neutrophil Rel % 06/03/2021 59.0  30.0 - 85.0 % Final   • Lymphocyte Rel % 06/03/2021 28.3  20.0 - 45.0 % Final   • Monocyte Rel % 06/03/2021 8.2  3.0 - 10.0 % Final   • Eosinophil Rel % 06/03/2021 3.2  0.0 - 7.0 % Final   • Basophil Rel % 06/03/2021 1.0  0.0 - 3.0 % Final   • Neutrophils Absolute 06/03/2021 3.68  2.00 - 8.00 10*3/uL Final   • Lymphocytes Absolute 06/03/2021 1.76  1.00 - 5.00 10*3/uL Final   • Monocytes Absolute 06/03/2021 0.51  0.20 - 1.20 10*3/uL Final   • Eosinophils Absolute 06/03/2021 0.20  0.00 - 0.70 10*3/uL Final   • Basophils Absolute 06/03/2021 0.06  0.00 - 0.20 10*3/uL Final   • Immature Granulocyte Rel % 06/03/2021 0.3  0.0 - 1.8 % Final   • Abs Imm Gran 06/03/2021 0.02  0.00 " - 0.20 10*3/uL Final   • RDW-SD 06/03/2021 44.8  36.4 - 46.3 fL Final   • NRBC 06/03/2021 0.00  0.00 - 0.70 % Final   • Hemoglobin A1C 06/03/2021 5.5  3.5 - 5.7 % Final    Comment: **Interpretation**  <7% in Controlled Diabetic Patients.  Assay Range 3.4-18.2%  ADA 2010 Standards of Medical Care in Diabetes suggest using  a cut point of >= 6.5% for diagnosis of diabetes and an A1C  range of 5.7%-6.4% as a category of increased risk for future  diabetes.     • Mean Bld Glu Estim. 06/03/2021 111  mg/dL Final   • Free T4 06/03/2021 1.1  0.9 - 1.8 ng/dL Final   • TSH 06/03/2021 1.630  0.270 - 4.200 m[iU]/L Final   • Triglycerides 06/03/2021 106  40 - 150 mg/dL Final    Comment: <150 mg/dL-Normal  150-199 mg/dL-Borderline High  200-499 mg/dL-High  >500 mg/dL- Very High     • Total Cholesterol 06/03/2021 216 (H)  107 - 200 mg/dL Final    Comment: <200 mg/dL-Desirable  200-239 mg/dL-Borderline High  >240 mg/dL-High  >500 mg/dL-Very High     • HDL Cholesterol 06/03/2021 68 (H)  40 - 60 mg/dL Final    Comment: <40 mg/dL-Low  >60 mg/dL- Desirable     • Chol/HDL Ratio 06/03/2021 3.2  3.0 - 6.0 NA Final   • LDL Cholesterol  06/03/2021 127 (H)  70 - 100 mg/dL Final    Comment: Recommended  LDL Levels  <100 mg/dL-Optimal  100-129 mg/dL-Near Optimal/above optimal  130-159 mg/dL-Borderline High  160-189 mg/dL-High  >190 mg/dL-Very High     • VLDL Cholesterol 06/03/2021 21  5 - 37 mg/dL Final   • Glucose 06/03/2021 101 (H)  65 - 99 mg/dL Final   • BUN 06/03/2021 11  5 - 25 mg/dL Final   • Creatinine 06/03/2021 0.90  0.50 - 0.90 mg/dL Final   • BUN/Creatinine Ratio 06/03/2021 12  6 - 20 [ratio] Final   • GFR 06/03/2021 >60  >60 mL/min/[1.73_m2] Final    Comment: Interpretative Data:  ------------------------------------  STAGE                  GFR  Stage 1                90 mL/min or greater  Stage 2                60-89 mL/min  Stage 3                30-59 mL/min  Stage 4                15-29 mL/min  Value <60 mL/min for 3 or more  months is defined as CKD.     • Sodium 06/03/2021 140  135 - 147 mmol/L Final   • Potassium 06/03/2021 4.3  3.5 - 5.3 mmol/L Final   • Chloride 06/03/2021 101  99 - 111 mmol/L Final   • CO2 06/03/2021 26  22 - 32 mmol/L Final   • Anion Gap 06/03/2021 17  8 - 19 mmol/L Final   • OSMOLALITY CALC 06/03/2021 290  273 - 304 Final   • Total Protein 06/03/2021 7.8  6.3 - 8.2 g/dL Final    Comment: If Patient is receiving dextran as a blood volume expander  result may show a potential interference.     • Albumin 06/03/2021 4.7  3.5 - 5.0 g/dL Final   • Globulin 06/03/2021 3.1  2.0 - 3.5 g/dL Final   • A/G Ratio 06/03/2021 1.5  1.4 - 2.6 [ratio] Final   • Calcium 06/03/2021 9.5  8.7 - 10.4 mg/dL Final   • Alkaline Phosphatase 06/03/2021 81  43 - 160 U/L Final   • ALT (SGPT) 06/03/2021 19  10 - 40 U/L Final   • AST (SGOT) 06/03/2021 24  15 - 50 U/L Final   • Total Bilirubin 06/03/2021 0.41  0.20 - 1.30 mg/dL Final   • 25 Hydroxy, Vitamin D 06/03/2021 36.8  30.0 - 100.0 ng/mL Final    Comment: Vitamin D Status          Range  ---------------------------------------  Deficiency                <10 ng/mL  Insufficiency             10-30 ng/mL  Sufficiency                ng/mL  Toxicity                  >100 ng/mL     • Vitamin B-12 06/03/2021 695  211 - 911 pg/mL Final   • Folate 06/03/2021 >20.0  4.8 - 20.0 ng/mL Final    Comment: Results may be falsely decreased due to light exposure if  testing added on after collection.     • T3, Free 06/03/2021 3.0  2.0 - 4.4 pg/mL Final       EKG Results:  No orders to display       Imaging Results:  No Images in the past 120 days found..      Assessment & Plan   Diagnoses and all orders for this visit:    1. Major depressive disorder, recurrent episode, moderate (HCC) (Primary)  -     ARIPiprazole (Abilify) 5 MG tablet; Take 1 tablet by mouth Daily.  Dispense: 90 tablet; Refill: 1    2. Generalized anxiety disorder  -     clonazePAM (KlonoPIN) 1 MG tablet; Take 1 tablet by mouth 3  (Three) Times a Day As Needed for Anxiety.  Dispense: 60 tablet; Refill: 5    3. Insomnia due to mental condition  -     ARIPiprazole (Abilify) 5 MG tablet; Take 1 tablet by mouth Daily.  Dispense: 90 tablet; Refill: 1        Visit Diagnoses:    ICD-10-CM ICD-9-CM   1. Major depressive disorder, recurrent episode, moderate (HCC)  F33.1 296.32   2. Generalized anxiety disorder  F41.1 300.02   3. Insomnia due to mental condition  F51.05 300.9     327.02     1/17: Increase abilify, a bit more linear. Answered questions more accurately. Appears bipolar hypomanic. 17 minutes of supportive psychotherapy with goal to strengthen defenses, promote problems solving, restore adaptive functioning and provide symptom relief. The therapeutic alliance was strengthened to encourage the patient to express their thoughts and feelings. Esteem building was enhanced through praise, reassurance, normalizing and encouragement. Coping skills were enhanced to build distress tolerance skills and emotional regulation. Allowed patient to freely discuss issues without interruption or judgement with unconditional positive regard, active listening skills, and empathy. Provided a safe, confidential environment to facilitate the development of a positive therapeutic relationship and encourage open, honest communication. Assisted patient in identifying risk factors which would indicate the need for higher level of care including thoughts to harm self or others and/or self-harming behavior and encouraged patient to contact this office, call 911, or present to the nearest emergency room should any of these events occur. Assisted patient in processing session content; acknowledged and normalized patient’s thoughts, feelings, and concerns by utilizing a person-centered approach in efforts to build appropriate rapport and a positive therapeutic relationship with open and honest communication. Patient given education on medication side effects,  diagnosis/illness and relapse symptoms. Plan to continue supportive psychotherapy in next appointment to provide symptom relief.  Diagnoses: as above  Symptoms: as above  Functional status: fair  Mental Status Exam: as above    Treatment plan: Medication management and supportive psychotherapy  Prognosis: fair to good  Progress: minimal progress  6 wks      12/22: Very talkative and tangential, hard to get anything out of the patient.  Trouble with answering questions, avoids saying yes or no to yes or no questions.  Start Abilify and see back in 4 weeks.  Request records from [facility].  Unclear what the diagnosis is but it could be bipolar.    PLAN:  26. Safety: No acute safety concerns  27. Therapy: None  28. Risk Assessment: Risk of self-harm acutely is moderate.  Risk factors include anxiety disorder, mood disorder, access to weapons and recent psychosocial stressors (pandemic). Protective factors include no family history, no present SI, no history of suicide attempts or self-harm in the past, minimal AODA, healthcare seeking, future orientation, willingness to engage in care.  Risk of self-harm chronically is also moderate, but could be further elevated in the event of treatment noncompliance and/or AODA.  29. Meds:  a. INCREASE Abilify 2 to 5 mg nightly. Risks, benefits, alternatives discussed with patient including increased energy, exacerbation of irritability, akathisia, GI upset, orthostatic hypotension, increased appetite, tardive dyskinesia.  After discussion of these risks and benefits, the patient voiced understanding and agreed to proceed.  b. CONTINUE Wellbutrin  mg bid. Risks, benefits, alternatives discussed with patient including nausea, GI upset, increased energy, exacerbation of irritability, insomnia, lowering of seizure threshold.  After discussion of these risks and benefits, the patient voiced understanding and agreed to proceed.  c. CONTINUE clonazepam 1 mg tid prn. Risks, benefits, and  alternatives discussed with patient including sedation/falls risk, dizziness, disinhibition, headache, fatigue, dry mouth, blurred vision, constipation, nausea, diarrhea, heartburn, unusual taste in mouth, urinary retention, increased appetite, restlessness, sexual dysfunction, sweating, weight gain, cardiac arrhythmias, seizures, and fall risk.  After discussion of these risks and benefits, patient voiced understanding and agreed to proceed.  Brynn reviewed, UDS ordered, and controlled substance agreement signed & witnessed.  d. CONTINUE  Doxepin 25 ng nightly. Risks, benefits, alternatives discussed with patient including dizziness/falls risk, sedation, GI upset, constipation, possible blurred vision.  After discussion of these risks and benefits, the patient voiced understanding and agreed to proceed.  30. Labs: None  31. Follow up: 6 weeks    Patient screened positive for depression based on a PHQ-9 score of  on . Follow-up recommendations include: Prescribed antidepressant medication treatment and Suicide Risk Assessment performed.           TREATMENT PLAN/GOALS: Continue supportive psychotherapy efforts and medications as indicated. Treatment and medication options discussed during today's visit. Patient acknowledged and verbally consented to continue with current treatment plan and was educated on the importance of compliance with treatment and follow-up appointments.    MEDICATION ISSUES:  BRYNN reviewed as expected.  Discussed medication options and treatment plan of prescribed medication as well as the risks, benefits, and side effects including potential falls, possible impaired driving and metabolic adversities among others. Patient is agreeable to call the office with any worsening of symptoms or onset of side effects. Patient is agreeable to call 911 or go to the nearest ER should he/she begin having SI/HI. No medication side effects or related complaints today.     MEDS ORDERED DURING VISIT:  New  Medications Ordered This Visit   Medications   • clonazePAM (KlonoPIN) 1 MG tablet     Sig: Take 1 tablet by mouth 3 (Three) Times a Day As Needed for Anxiety.     Dispense:  60 tablet     Refill:  5     New dosing regimen. Thx 4 all u do.   • ARIPiprazole (Abilify) 5 MG tablet     Sig: Take 1 tablet by mouth Daily.     Dispense:  90 tablet     Refill:  1       Return in about 6 weeks (around 2/28/2023).         This document has been electronically signed by Kai Elliott MD  January 17, 2023 13:46 EST    Dictated Utilizing Dragon Dictation: Part of this note may be an electronic transcription/translation of spoken language to printed text using the Dragon Dictation System.

## 2023-01-17 NOTE — PATIENT INSTRUCTIONS
1.  Please return to clinic at your next scheduled visit.  Contact the clinic ([phone]) at least 24 hours prior in the event you need to cancel.  2.  Do no harm to yourself or others.    3.  Avoid alcohol and drugs.    4.  Take all medications as prescribed.  Please contact the clinic with any concerns. If you are in need of medication refills, please call the clinic at [phone].    5. Should you want to get in touch with your provider, Dr. Kai Elliott, please utilize [employer] or contact the office ([phone]), and staff will be able to page Dr. Elliott directly.  6.  In the event you have personal crisis, contact the following crisis numbers: Suicide Prevention Hotline 1-291.922.4821; TREASURE Helpline 1-026-620-CCIA; [facility] [phone]; text HELLO to [phone]; or 372.     SPECIFIC RECOMMENDATIONS:     1.      Medications discussed at this encounter:                   - increase abilify     2.      Psychotherapy recommendations:      3.     Return to clinic: 6 weeks

## 2023-02-01 ENCOUNTER — TELEPHONE (OUTPATIENT)
Dept: PSYCHIATRY | Facility: CLINIC | Age: 68
End: 2023-02-01
Payer: MEDICARE

## 2023-02-01 NOTE — TELEPHONE ENCOUNTER
Called pt to update that our office has received records from [facility] and still waiting for records from [facility].     While on the phone with pt, she wanted to let provider know that she hasn't noticed much improvement since last appt and didn't know if this was normal or if provider could offer advice for her.     Pt was reassured will pass this message along to provider, to which pt expressed understanding. Please review.

## 2023-02-06 ENCOUNTER — TELEPHONE (OUTPATIENT)
Dept: PSYCHIATRY | Facility: CLINIC | Age: 68
End: 2023-02-06
Payer: MEDICARE

## 2023-02-20 ENCOUNTER — OFFICE VISIT (OUTPATIENT)
Dept: PSYCHIATRY | Facility: CLINIC | Age: 68
End: 2023-02-20
Payer: MEDICARE

## 2023-02-20 VITALS
SYSTOLIC BLOOD PRESSURE: 125 MMHG | HEART RATE: 88 BPM | HEIGHT: 63 IN | WEIGHT: 142 LBS | DIASTOLIC BLOOD PRESSURE: 67 MMHG | BODY MASS INDEX: 25.16 KG/M2

## 2023-02-20 DIAGNOSIS — F33.1 MAJOR DEPRESSIVE DISORDER, RECURRENT EPISODE, MODERATE: Primary | ICD-10-CM

## 2023-02-20 DIAGNOSIS — F41.1 GENERALIZED ANXIETY DISORDER: ICD-10-CM

## 2023-02-20 DIAGNOSIS — F51.05 INSOMNIA DUE TO MENTAL CONDITION: ICD-10-CM

## 2023-02-20 PROCEDURE — 99214 OFFICE O/P EST MOD 30 MIN: CPT | Performed by: STUDENT IN AN ORGANIZED HEALTH CARE EDUCATION/TRAINING PROGRAM

## 2023-02-20 PROCEDURE — 90833 PSYTX W PT W E/M 30 MIN: CPT | Performed by: STUDENT IN AN ORGANIZED HEALTH CARE EDUCATION/TRAINING PROGRAM

## 2023-02-20 RX ORDER — LITHIUM CARBONATE 150 MG/1
150 CAPSULE ORAL NIGHTLY
Qty: 30 CAPSULE | Refills: 2 | Status: SHIPPED | OUTPATIENT
Start: 2023-02-20 | End: 2023-03-07 | Stop reason: SDUPTHER

## 2023-02-20 RX ORDER — CLONAZEPAM 1 MG/1
1 TABLET ORAL 3 TIMES DAILY PRN
Qty: 90 TABLET | Refills: 5 | Status: SHIPPED | OUTPATIENT
Start: 2023-02-20

## 2023-02-20 NOTE — PATIENT INSTRUCTIONS
1.  Please return to clinic at your next scheduled visit.  Contact the clinic ([phone]) at least 24 hours prior in the event you need to cancel.  2.  Do no harm to yourself or others.    3.  Avoid alcohol and drugs.    4.  Take all medications as prescribed.  Please contact the clinic with any concerns. If you are in need of medication refills, please call the clinic at [phone].    5. Should you want to get in touch with your provider, Dr. Kai Elliott, please utilize [employer] or contact the office ([phone]), and staff will be able to page Dr. Elliott directly.  6.  In the event you have personal crisis, contact the following crisis numbers: Suicide Prevention Hotline 1-398.450.9568; TREASURE Helpline 5-814-199-SSQC; [facility] [phone]; text HELLO to [phone]; or [phone].     SPECIFIC RECOMMENDATIONS:     1.      Medications discussed at this encounter:                   - stop abilify and start lithium     2.      Psychotherapy recommendations:

## 2023-02-20 NOTE — PROGRESS NOTES
"Subjective   Alissa Bruce is a 67 y.o. female who presents today for initial evaluation     Referring Provider:  No referring provider defined for this encounter.    Chief Complaint: Anxiety    History of Present Illness:     12/21: Chart review: Patient has a history of anxiety for several years, followed by [facility] during this time for medication management with little success.  Sent to us for a second opinion.  On Wellbutrin  twice daily, doxepin 25 nightly, Klonopin 1 mg p.o. 3 times daily as needed, but she takes about 1-1/2 tabs daily.  Nothing in PDMP.  No Care Everywhere.  Older labs from June 2021 show reassuring CMP, thyroid studies, folate, iron studies, elevated lipids, reassuring B12, vitamin D, CBC.  No head imaging, EKG.    Psychotropic medication review includes Ativan, Vraylar, mirtazapine, Seroquel XR, Celexa, Cymbalta, Latuda, Adderall XR, Rexulti, Prozac, Abilify, Lamictal, BuSpar, Zoloft, Lexapro, Effexor.  There may have been others prior to [facility] management.    \"Alissa\" and \"Feng\"   Most concerning symptom: anxiety  Consider auvelity, MAOIs    2/20: In person interview:  1. Chart review: Patient discontinued Abilify stating that it made her anxious.  Urgent appointment.  2. Planning: Increase abilify, a bit more linear. Answered questions more accurately. Appears bipolar hypomanic.  a. Depakote or lithium?  b. Could also add propranolol for akathisia to see if that helps her tolerate Abilify better.  c. We want to give each medication a good try.  We do not want to get into the cycle of trying and failing medications too quickly.  d. Neuropsych testing.  3. \"Abilify was just making me more anxious.\"  a. Not talking over me at all. Still a little tangential, but even this has improved. Still talkative.  b. Periods of silence.  c. Stopped abilify over a week ago.  d. My sister was on lithium before  4. Mood/Depression:  a. I am never really depressed  5. Anxiety:  a. I get nervous going " "out.  b. I was more irritable  6. Panic attacks: n  7. Energy: poor  8. Concentration: poor  9. Sleeping:   a. Well at times  b. Not using CPAP machine  10. Eating: stable  11. Refills: y  12. Substances: n  13. Therapy: n  14. Medication compliant: y  15. No SI HI AVH.      1/17: In person interview:  16. Chart review: No new.  17. Planning: Very difficult patient.  Started Abilify at last visit.  18. \"I couldn't tell much of a difference.\"  a. Records from Delfina Quevedo, but not from Kamilah Dee, which have been requested.  b. Switched taking the abilify to the mornings.  c. Most concerning symptom is \"anxiety\"  d. Chronic constipation  19. Mood/Depression:  20. Anxiety: feel anxious  21. Panic attacks:  22. Energy: tired all the time, for a long time  a. Unsure if it is related to abilify  b. It's been 5 years  23. Concentration: not at goal  24. Sleeping:   a. On CPAP  25. Eating: 3 lb wg 12/22  26. Refills: y  27. Substances: n  28. Therapy: n  29. Medication compliant: y  a. Take klonopin 1.5 total daily  30. No SI HI AVH.      12/22: In person.  Interview:  31. Chart review:   a. Would rule out ADHD.  b. Would rule out PTSD, consider guanfacine, clonidine, prazosin.  32. His/Her Story: \"I was working with Delfina Quevedo.\"  a. P15, G14  b. Had been working with Kamilah Dee also, and Dr. Rodrigo guerra. I never even saw Delfina -- maybe 2 years  d. Talks over me, talkative, but interruptible, long silences too  e. One day I woke up and \"something wasn't right\" 5 years ago  f. Didn't like the ativan  g. 6 mos I was perfectly fine: 2018 (jan to June)?  h. 2017, multiple losses, when it all began  i. Discussed how klonopin is addictive  j. Everything that I do \"excites me\"  k. Most of the day I watch TV  33. Depression/Mood:  a. Depressed mood, anhedonia, hopelessness, poor energy, poor concentration, weight loss or weight gain, psychomotor retardation or agitation, insomnia controlled on clonazepam.  b. Seasonal " pattern:  c. Severity: Moderate  d. Duration: 5 years ago  34. Anxiety:  a. Uncontrolled worrying, muscle tension, fatigue, poor concentration, feeling on edge, irritability, insomnia.  b. Severity: Moderate to severe  c. Duration: 5 years ago  35. Panic attacks: n  36. Psych ROS: Positive for depression, anxiety.  Negative for psychosis and williams.  a. ADHD: Deferred  b. PTSD: Deferred  37. No SI HI AVH.  38. Substances: No  39. Therapy: Briefly  40. Medication compliant: y    Access to Firearms: Yes, locked away    PHQ-9 Depression Screening  PHQ-9 Total Score:      Little interest or pleasure in doing things?     Feeling down, depressed, or hopeless?     Trouble falling or staying asleep, or sleeping too much?     Feeling tired or having little energy?     Poor appetite or overeating?     Feeling bad about yourself - or that you are a failure or have let yourself or your family down?     Trouble concentrating on things, such as reading the newspaper or watching television?     Moving or speaking so slowly that other people could have noticed? Or the opposite - being so fidgety or restless that you have been moving around a lot more than usual?     Thoughts that you would be better off dead, or of hurting yourself in some way?     PHQ-9 Total Score       VITOR-7       Past Surgical History:  Past Surgical History:   Procedure Laterality Date   • COLONOSCOPY  Age 52   • EYE SURGERY  2004    Lasik to correct vision       Problem List:  Patient Active Problem List   Diagnosis   • Depression with anxiety   • Hip pain       Allergy:   Allergies   Allergen Reactions   • Sulfa Antibiotics Unknown - Low Severity        Discontinued Medications:  Medications Discontinued During This Encounter   Medication Reason   • ARIPiprazole (Abilify) 5 MG tablet *Therapy completed       Current Medications:   Current Outpatient Medications   Medication Sig Dispense Refill   • acetaminophen (TYLENOL) 500 MG tablet Take 500 mg by mouth  Every 6 (Six) Hours As Needed for Mild Pain.     • buPROPion SR (WELLBUTRIN SR) 100 MG 12 hr tablet Take 100 mg by mouth 2 (Two) Times a Day.     • clonazePAM (KlonoPIN) 1 MG tablet Take 1 tablet by mouth 3 (Three) Times a Day As Needed for Anxiety. 60 tablet 5   • doxepin (SINEquan) 25 MG capsule Take 25 mg by mouth Every Night.     • polyethylene glycol (MIRALAX) 17 g packet Take 17 g by mouth Daily.     • lithium carbonate 150 MG capsule Take 1 capsule by mouth Every Night. 30 capsule 2     No current facility-administered medications for this visit.       Past Medical History:  Past Medical History:   Diagnosis Date   • Anxiety    • Depression        Past Psychiatric History:  Began Treatment: a few years ago  Diagnoses: dep and anx, bipolar 2  Psychiatrist: [facility]  Therapist: yes briefly  Admission History:Denies  Medication Trials:    Multiple, can't remember inidivdual effects    Only one that helped me get back to my old self was zoloft during those six months    Self Harm: Denies  Suicide Attempts:Denies   Psychosis, Anxiety, Depression: Denies    Substance Abuse History:   Types:Denies all, including illicit  Withdrawal Symptoms:Denies  Longest Period Sober:Not Applicable   AA: Not applicable     Social History:  Martial Status:  Employed:No  Kids:Yes  House:Lives in a house   History: Denies    Social History     Socioeconomic History   • Marital status:    Tobacco Use   • Smoking status: Never   • Smokeless tobacco: Never   Vaping Use   • Vaping Use: Never used   Substance and Sexual Activity   • Alcohol use: Never   • Drug use: Never   • Sexual activity: Not Currently     Partners: Male     Birth control/protection: Surgical, Tubal ligation       Family History:   Suicide Attempts: Denies  Suicide Completions:Denies      Family History   Problem Relation Age of Onset   • Bipolar disorder Sister         [phone] ()       Developmental History:       Childhood:  "Denies Abuse  High School:Completed  College: denies    · Mental Status Exam  · Appearance  · : groomed, good eye contact, normal street clothes  · Behavior  · : pleasant and cooperative  · Motor  · : No abnormal  · Speech  · : very talkative, some periods of silence, normal rhythm, rate, volume, tone, not hyperverbal, not pressured, normal prosidy  · Mood  · : \"I don't know\"  · Affect  · : euthymic, mood congruent, good variability  · Thought Content  · : negative suicidal ideations, negative homicidal ideations, negative obsessions  · Perceptions  · : negative auditory hallucinations, negative visual hallucinations  · Thought Process  · : not as tangential  · Insight/Judgement  · : Fair/fair  · Cognition  · : grossly intact  · Attention   : intact      Review of Systems:  Review of Systems   Constitutional: Positive for fatigue. Negative for diaphoresis.   HENT: Negative for drooling.    Eyes: Negative for visual disturbance.   Respiratory: Negative for cough and shortness of breath.    Cardiovascular: Negative for chest pain, palpitations and leg swelling.   Gastrointestinal: Positive for constipation. Negative for nausea and vomiting.   Endocrine: Negative for cold intolerance and heat intolerance.   Genitourinary: Negative for difficulty urinating.   Musculoskeletal: Negative for joint swelling.   Allergic/Immunologic: Negative for immunocompromised state.   Neurological: Positive for dizziness. Negative for seizures, speech difficulty and numbness.       Physical Exam:  Physical Exam    Vital Signs:   /67   Pulse 88   Ht 160 cm (63\")   Wt 64.4 kg (142 lb)   BMI 25.15 kg/m²      Lab Results:   No visits with results within 6 Month(s) from this visit.   Latest known visit with results is:   Hospital Outpatient Visit on 06/03/2021   Component Date Value Ref Range Status   • WBC 06/03/2021 6.23  4.80 - 10.80 10*3/uL Final   • RBC 06/03/2021 4.01 (L)  4.20 - 5.40 10*6/uL Final   • Hemoglobin 06/03/2021 " 12.1  12.0 - 16.0 g/dL Final   • Hematocrit 06/03/2021 38.1  37.0 - 47.0 % Final   • MCV 06/03/2021 95.0  81.0 - 99.0 fL Final   • MCH 06/03/2021 30.2  27.0 - 31.0 pg Final   • MCHC 06/03/2021 31.8 (L)  33.0 - 37.0 Final   • RDW 06/03/2021 12.8  11.7 - 14.4 % Final   • Platelets 06/03/2021 218  130 - 400 10*3/uL Final   • MPV 06/03/2021 12.1  9.4 - 12.3 fL Final   • Neutrophil Rel % 06/03/2021 59.0  30.0 - 85.0 % Final   • Lymphocyte Rel % 06/03/2021 28.3  20.0 - 45.0 % Final   • Monocyte Rel % 06/03/2021 8.2  3.0 - 10.0 % Final   • Eosinophil Rel % 06/03/2021 3.2  0.0 - 7.0 % Final   • Basophil Rel % 06/03/2021 1.0  0.0 - 3.0 % Final   • Neutrophils Absolute 06/03/2021 3.68  2.00 - 8.00 10*3/uL Final   • Lymphocytes Absolute 06/03/2021 1.76  1.00 - 5.00 10*3/uL Final   • Monocytes Absolute 06/03/2021 0.51  0.20 - 1.20 10*3/uL Final   • Eosinophils Absolute 06/03/2021 0.20  0.00 - 0.70 10*3/uL Final   • Basophils Absolute 06/03/2021 0.06  0.00 - 0.20 10*3/uL Final   • Immature Granulocyte Rel % 06/03/2021 0.3  0.0 - 1.8 % Final   • Abs Imm Gran 06/03/2021 0.02  0.00 - 0.20 10*3/uL Final   • RDW-SD 06/03/2021 44.8  36.4 - 46.3 fL Final   • NRBC 06/03/2021 0.00  0.00 - 0.70 % Final   • Hemoglobin A1C 06/03/2021 5.5  3.5 - 5.7 % Final    Comment: **Interpretation**  <7% in Controlled Diabetic Patients.  Assay Range 3.4-18.2%  ADA 2010 Standards of Medical Care in Diabetes suggest using  a cut point of >= 6.5% for diagnosis of diabetes and an A1C  range of 5.7%-6.4% as a category of increased risk for future  diabetes.     • Mean Bld Glu Estim. 06/03/2021 111  mg/dL Final   • Free T4 06/03/2021 1.1  0.9 - 1.8 ng/dL Final   • TSH 06/03/2021 1.630  0.270 - 4.200 m[iU]/L Final   • Triglycerides 06/03/2021 106  40 - 150 mg/dL Final    Comment: <150 mg/dL-Normal  150-199 mg/dL-Borderline High  200-499 mg/dL-High  >500 mg/dL- Very High     • Total Cholesterol 06/03/2021 216 (H)  107 - 200 mg/dL Final    Comment: <200  mg/dL-Desirable  200-239 mg/dL-Borderline High  >240 mg/dL-High  >500 mg/dL-Very High     • HDL Cholesterol 06/03/2021 68 (H)  40 - 60 mg/dL Final    Comment: <40 mg/dL-Low  >60 mg/dL- Desirable     • Chol/HDL Ratio 06/03/2021 3.2  3.0 - 6.0 NA Final   • LDL Cholesterol  06/03/2021 127 (H)  70 - 100 mg/dL Final    Comment: Recommended  LDL Levels  <100 mg/dL-Optimal  100-129 mg/dL-Near Optimal/above optimal  130-159 mg/dL-Borderline High  160-189 mg/dL-High  >190 mg/dL-Very High     • VLDL Cholesterol 06/03/2021 21  5 - 37 mg/dL Final   • Glucose 06/03/2021 101 (H)  65 - 99 mg/dL Final   • BUN 06/03/2021 11  5 - 25 mg/dL Final   • Creatinine 06/03/2021 0.90  0.50 - 0.90 mg/dL Final   • BUN/Creatinine Ratio 06/03/2021 12  6 - 20 [ratio] Final   • GFR 06/03/2021 >60  >60 mL/min/[1.73_m2] Final    Comment: Interpretative Data:  ------------------------------------  STAGE                  GFR  Stage 1                90 mL/min or greater  Stage 2                60-89 mL/min  Stage 3                30-59 mL/min  Stage 4                15-29 mL/min  Value <60 mL/min for 3 or more months is defined as CKD.     • Sodium 06/03/2021 140  135 - 147 mmol/L Final   • Potassium 06/03/2021 4.3  3.5 - 5.3 mmol/L Final   • Chloride 06/03/2021 101  99 - 111 mmol/L Final   • CO2 06/03/2021 26  22 - 32 mmol/L Final   • Anion Gap 06/03/2021 17  8 - 19 mmol/L Final   • OSMOLALITY CALC 06/03/2021 290  273 - 304 Final   • Total Protein 06/03/2021 7.8  6.3 - 8.2 g/dL Final    Comment: If Patient is receiving dextran as a blood volume expander  result may show a potential interference.     • Albumin 06/03/2021 4.7  3.5 - 5.0 g/dL Final   • Globulin 06/03/2021 3.1  2.0 - 3.5 g/dL Final   • A/G Ratio 06/03/2021 1.5  1.4 - 2.6 [ratio] Final   • Calcium 06/03/2021 9.5  8.7 - 10.4 mg/dL Final   • Alkaline Phosphatase 06/03/2021 81  43 - 160 U/L Final   • ALT (SGPT) 06/03/2021 19  10 - 40 U/L Final   • AST (SGOT) 06/03/2021 24  15 - 50 U/L Final    • Total Bilirubin 06/03/2021 0.41  0.20 - 1.30 mg/dL Final   • 25 Hydroxy, Vitamin D 06/03/2021 36.8  30.0 - 100.0 ng/mL Final    Comment: Vitamin D Status          Range  ---------------------------------------  Deficiency                <10 ng/mL  Insufficiency             10-30 ng/mL  Sufficiency                ng/mL  Toxicity                  >100 ng/mL     • Vitamin B-12 06/03/2021 695  211 - 911 pg/mL Final   • Folate 06/03/2021 >20.0  4.8 - 20.0 ng/mL Final    Comment: Results may be falsely decreased due to light exposure if  testing added on after collection.     • T3, Free 06/03/2021 3.0  2.0 - 4.4 pg/mL Final       EKG Results:  No orders to display       Imaging Results:  No Images in the past 120 days found..      Assessment & Plan   Diagnoses and all orders for this visit:    1. Major depressive disorder, recurrent episode, moderate (HCC) (Primary)  -     lithium carbonate 150 MG capsule; Take 1 capsule by mouth Every Night.  Dispense: 30 capsule; Refill: 2    2. Generalized anxiety disorder  -     lithium carbonate 150 MG capsule; Take 1 capsule by mouth Every Night.  Dispense: 30 capsule; Refill: 2    3. Insomnia due to mental condition  -     lithium carbonate 150 MG capsule; Take 1 capsule by mouth Every Night.  Dispense: 30 capsule; Refill: 2        Visit Diagnoses:    ICD-10-CM ICD-9-CM   1. Major depressive disorder, recurrent episode, moderate (HCC)  F33.1 296.32   2. Generalized anxiety disorder  F41.1 300.02   3. Insomnia due to mental condition  F51.05 300.9     327.02     2/20: [place] at next visit, then neuropsych testing. Seems hypomanic, improved but not tolerating abilify. Stop abilify and start lithium qhs. 17 minutes of supportive psychotherapy with goal to strengthen defenses, promote problems solving, restore adaptive functioning and provide symptom relief. The therapeutic alliance was strengthened to encourage the patient to express their thoughts and feelings. Esteem  building was enhanced through praise, reassurance, normalizing and encouragement. Coping skills were enhanced to build distress tolerance skills and emotional regulation. Allowed patient to freely discuss issues without interruption or judgement with unconditional positive regard, active listening skills, and empathy. Provided a safe, confidential environment to facilitate the development of a positive therapeutic relationship and encourage open, honest communication. Assisted patient in identifying risk factors which would indicate the need for higher level of care including thoughts to harm self or others and/or self-harming behavior and encouraged patient to contact this office, call 911, or present to the nearest emergency room should any of these events occur. Assisted patient in processing session content; acknowledged and normalized patient’s thoughts, feelings, and concerns by utilizing a person-centered approach in efforts to build appropriate rapport and a positive therapeutic relationship with open and honest communication. Patient given education on medication side effects, diagnosis/illness and relapse symptoms. Plan to continue supportive psychotherapy in next appointment to provide symptom relief.  Diagnoses: as above  Symptoms: as above  Functional status: fair  Mental Status Exam: as above    Treatment plan: Medication management and supportive psychotherapy  Prognosis: fair  Progress: seems quite improved  3/7      1/17: Increase abilify, a bit more linear. Answered questions more accurately. Appears bipolar hypomanic. 17   Prognosis: fair to good  Progress: minimal progress  6 wks      12/22: Very talkative and tangential, hard to get anything out of the patient.  Trouble with answering questions, avoids saying yes or no to yes or no questions.  Start Abilify and see back in 4 weeks.  Request records from [facility].  Unclear what the diagnosis is but it could be bipolar.    PLAN:  41. Safety: No acute  safety concerns  42. Therapy: None  43. Risk Assessment: Risk of self-harm acutely is moderate.  Risk factors include anxiety disorder, mood disorder, access to weapons and recent psychosocial stressors (pandemic). Protective factors include no family history, no present SI, no history of suicide attempts or self-harm in the past, minimal AODA, healthcare seeking, future orientation, willingness to engage in care.  Risk of self-harm chronically is also moderate, but could be further elevated in the event of treatment noncompliance and/or AODA.  44. Meds:  a. STOP Abilify 2 to 5 mg nightly. Akathisia.   b. START lithium 150 mg nightly. Risks, benefits, alternatives discussed with patient including ataxia, sedation, dizziness/falls risk, dysarthria, tremor, possible nephrogenic diabetes insipidus, diarrhea, nausea, weight gain, rash, and possible hypothyroid goiter.  Patient also counseled to monitor hydration status and sodium intake as changes can result in rapidly increasing, even toxic, levels of lithium. Patient also counseled regarding the use of diuretics and their ability to increase lithium levels. After discussion of these risks and benefits, the patient voiced understanding and agreed to proceed.  c. CONTINUE Wellbutrin  mg bid. Risks, benefits, alternatives discussed with patient including nausea, GI upset, increased energy, exacerbation of irritability, insomnia, lowering of seizure threshold.  After discussion of these risks and benefits, the patient voiced understanding and agreed to proceed.  d. CONTINUE clonazepam 1 mg tid prn. Risks, benefits, and alternatives discussed with patient including sedation/falls risk, dizziness, disinhibition, headache, fatigue, dry mouth, blurred vision, constipation, nausea, diarrhea, heartburn, unusual taste in mouth, urinary retention, increased appetite, restlessness, sexual dysfunction, sweating, weight gain, cardiac arrhythmias, seizures, and fall risk.   After discussion of these risks and benefits, patient voiced understanding and agreed to proceed.  Brynn reviewed, UDS ordered, and controlled substance agreement signed & witnessed.  e. CONTINUE  Doxepin 25 ng nightly. Risks, benefits, alternatives discussed with patient including dizziness/falls risk, sedation, GI upset, constipation, possible blurred vision.  After discussion of these risks and benefits, the patient voiced understanding and agreed to proceed.  45. Labs: None    Patient screened positive for depression based on a PHQ-9 score of  on . Follow-up recommendations include: Prescribed antidepressant medication treatment and Suicide Risk Assessment performed.           TREATMENT PLAN/GOALS: Continue supportive psychotherapy efforts and medications as indicated. Treatment and medication options discussed during today's visit. Patient acknowledged and verbally consented to continue with current treatment plan and was educated on the importance of compliance with treatment and follow-up appointments.    MEDICATION ISSUES:  BRYNN reviewed as expected.  Discussed medication options and treatment plan of prescribed medication as well as the risks, benefits, and side effects including potential falls, possible impaired driving and metabolic adversities among others. Patient is agreeable to call the office with any worsening of symptoms or onset of side effects. Patient is agreeable to call 911 or go to the nearest ER should he/she begin having SI/HI. No medication side effects or related complaints today.     MEDS ORDERED DURING VISIT:  New Medications Ordered This Visit   Medications   • lithium carbonate 150 MG capsule     Sig: Take 1 capsule by mouth Every Night.     Dispense:  30 capsule     Refill:  2       Return in about 15 days (around 3/7/2023).         This document has been electronically signed by Kai Elliott MD  February 20, 2023 14:37 EST    Dictated Utilizing Dragon Dictation: Part of this  note may be an electronic transcription/translation of spoken language to printed text using the Dragon Dictation System.

## 2023-02-22 DIAGNOSIS — F33.1 MAJOR DEPRESSIVE DISORDER, RECURRENT EPISODE, MODERATE: Primary | ICD-10-CM

## 2023-02-22 DIAGNOSIS — F51.05 INSOMNIA DUE TO MENTAL CONDITION: ICD-10-CM

## 2023-02-22 RX ORDER — DOXEPIN HYDROCHLORIDE 25 MG/1
CAPSULE ORAL
Qty: 90 CAPSULE | Refills: 1 | Status: SHIPPED | OUTPATIENT
Start: 2023-02-22

## 2023-02-22 RX ORDER — BUPROPION HYDROCHLORIDE 100 MG/1
TABLET, EXTENDED RELEASE ORAL
Qty: 180 TABLET | Refills: 1 | Status: SHIPPED | OUTPATIENT
Start: 2023-02-22

## 2023-02-22 NOTE — TELEPHONE ENCOUNTER
Med Refill Request apparently according to pharmacy these 2 meds were last filled by different providers,  Please review

## 2023-03-07 ENCOUNTER — OFFICE VISIT (OUTPATIENT)
Dept: PSYCHIATRY | Facility: CLINIC | Age: 68
End: 2023-03-07
Payer: MEDICARE

## 2023-03-07 VITALS
HEART RATE: 90 BPM | DIASTOLIC BLOOD PRESSURE: 78 MMHG | BODY MASS INDEX: 25.15 KG/M2 | WEIGHT: 142 LBS | SYSTOLIC BLOOD PRESSURE: 123 MMHG

## 2023-03-07 DIAGNOSIS — F33.1 MAJOR DEPRESSIVE DISORDER, RECURRENT EPISODE, MODERATE: Primary | ICD-10-CM

## 2023-03-07 DIAGNOSIS — F41.1 GENERALIZED ANXIETY DISORDER: ICD-10-CM

## 2023-03-07 DIAGNOSIS — F51.05 INSOMNIA DUE TO MENTAL CONDITION: ICD-10-CM

## 2023-03-07 PROCEDURE — 90833 PSYTX W PT W E/M 30 MIN: CPT | Performed by: STUDENT IN AN ORGANIZED HEALTH CARE EDUCATION/TRAINING PROGRAM

## 2023-03-07 PROCEDURE — 99214 OFFICE O/P EST MOD 30 MIN: CPT | Performed by: STUDENT IN AN ORGANIZED HEALTH CARE EDUCATION/TRAINING PROGRAM

## 2023-03-07 RX ORDER — LITHIUM CARBONATE 150 MG/1
300 CAPSULE ORAL NIGHTLY
Qty: 60 CAPSULE | Refills: 2 | Status: SHIPPED | OUTPATIENT
Start: 2023-03-07

## 2023-03-07 NOTE — PATIENT INSTRUCTIONS
1.  Please return to clinic at your next scheduled visit.  Contact the clinic ([phone]) at least 24 hours prior in the event you need to cancel.  2.  Do no harm to yourself or others.    3.  Avoid alcohol and drugs.    4.  Take all medications as prescribed.  Please contact the clinic with any concerns. If you are in need of medication refills, please call the clinic at [phone].    5. Should you want to get in touch with your provider, Dr. Kai Elliott, please utilize My [employer] or contact the office ([phone]), and staff will be able to page Dr. Elliott directly.  6.  In the event you have personal crisis, contact the following crisis numbers: Suicide Prevention Hotline 1-936.313.8370; TREASURE Helpline 7-045-775-GEYA; [facility] Emergency Room [phone]; text HELLO to 192817; or 116.     SPECIFIC RECOMMENDATIONS:     1.      Medications discussed at this encounter:                   - increase lithium     2.      Psychotherapy recommendations:

## 2023-03-07 NOTE — PROGRESS NOTES
"Subjective   Alissa Lyudmila Bruce is a 67 y.o. female who presents today for initial evaluation     Referring Provider:  Delfina Quevedo APRN  [address] [place] [address]    Chief Complaint: Anxiety    History of Present Illness:     : Chart review: Patient has a history of anxiety for several years, followed by [facility] during this time for medication management with little success.  Sent to us for a second opinion.  On Wellbutrin  twice daily, doxepin 25 nightly, Klonopin 1 mg p.o. 3 times daily as needed, but she takes about 1-1/2 tabs daily.  Nothing in PDMP.  No Care Everywhere.  Older labs from 2021 show reassuring CMP, thyroid studies, folate, iron studies, elevated lipids, reassuring B12, vitamin D, CBC.  No head imaging, EKG.    Psychotropic medication review includes Ativan, Vraylar, mirtazapine, Seroquel XR, Celexa, Cymbalta, Latuda, Adderall XR, Rexulti, Prozac, Abilify, Lamictal, BuSpar, Zoloft, Lexapro, Effexor.  There may have been others prior to [facility] management.    \"Alissa\" and \"Feng\"   Most concerning symptom: anxiety  Consider auvelity, MAOIs    3/7: In person interview:  1. Chart review: No new.  2. Planning: [place] at next visit, then neuropsych testing. Seems hypomanic, improved but not tolerating abilify. Stop abilify and start lithium qhs.  3. \"My lips and eyes are dry.\"  a. I haven't noticed any change as far as any improvement.  b. I think my lip is dryer.  c. Had nightmares initially when taking it.  4. Mood/Depression: stable  5. Anxiety: stable  6. Panic attacks: n  7. Energy: stable  8. Concentration: stable  9. Sleeping: fairly good, (again, stopped CPAP machine)  10. Eatin lb wg   11. Refills: y  12. Substances: n  13. Therapy: n  14. Medication compliant: y  15. No SI HI AVH.      : In person interview:  16. Chart review: Patient discontinued Abilify stating that it made her anxious.  Urgent appointment.  17. Planning: Increase abilify, a bit more " "linear. Answered questions more accurately. Appears bipolar hypomanic.  a. Depakote or lithium?  b. Could also add propranolol for akathisia to see if that helps her tolerate Abilify better.  c. We want to give each medication a good try.  We do not want to get into the cycle of trying and failing medications too quickly.  d. Neuropsych testing.  18. \"Abilify was just making me more anxious.\"  a. Not talking over me at all. Still a little tangential, but even this has improved. Still talkative.  b. Periods of silence.  c. Stopped abilify over a week ago.  d. My sister was on lithium before  19. Mood/Depression:  a. I am never really depressed  20. Anxiety:  a. I get nervous going out.  b. I was more irritable  21. Panic attacks: n  22. Energy: poor  23. Concentration: poor  24. Sleeping:   a. Well at times  b. Not using CPAP machine  25. Eating: stable  26. Refills: y  27. Substances: n  28. Therapy: n  29. Medication compliant: y  30. No SI HI AVH.      1/17: In person interview:  31. Chart review: No new.  32. Planning: Very difficult patient.  Started Abilify at last visit.  33. \"I couldn't tell much of a difference.\"  a. Records from Delfina Quevedo, but not from Kamilah Dee, which have been requested.  b. Switched taking the abilify to the mornings.  c. Most concerning symptom is \"anxiety\"  d. Chronic constipation  34. Mood/Depression:  35. Anxiety: feel anxious  36. Panic attacks:  37. Energy: tired all the time, for a long time  a. Unsure if it is related to abilify  b. It's been 5 years  38. Concentration: not at goal  39. Sleeping:   a. On CPAP  40. Eating: 3 lb wg 12/22  41. Refills: y  42. Substances: n  43. Therapy: n  44. Medication compliant: y  a. Take klonopin 1.5 total daily  45. No SI HI AVH.      12/22: In person.  Interview:  46. Chart review:   a. Would rule out ADHD.  b. Would rule out PTSD, consider guanfacine, clonidine, prazosin.  47. His/Her Story: \"I was working with Delfina " "Medley.\"  a. P15, G14  b. Had been working with Kamilah Dee also, and Dr. Rodrigo painting I never even saw Delfina -- maybe 2 years  d. Talks over me, talkative, but interruptible, long silences too  e. One day I woke up and \"something wasn't right\" 5 years ago  f. Didn't like the ativan  g. 6 mos I was perfectly fine: 2018 (jan to June)?  h. 2017, multiple losses, when it all began  i. Discussed how klonopin is addictive  j. Everything that I do \"excites me\"  k. Most of the day I watch TV  48. Depression/Mood:  a. Depressed mood, anhedonia, hopelessness, poor energy, poor concentration, weight loss or weight gain, psychomotor retardation or agitation, insomnia controlled on clonazepam.  b. Seasonal pattern:  c. Severity: Moderate  d. Duration: 5 years ago  49. Anxiety:  a. Uncontrolled worrying, muscle tension, fatigue, poor concentration, feeling on edge, irritability, insomnia.  b. Severity: Moderate to severe  c. Duration: 5 years ago  50. Panic attacks: n  51. Psych ROS: Positive for depression, anxiety.  Negative for psychosis and williams.  a. ADHD: Deferred  b. PTSD: Deferred  52. No SI HI AVH.  53. Substances: No  54. Therapy: Briefly  55. Medication compliant: y    Access to Firearms: Yes, locked away    PHQ-9 Depression Screening  PHQ-9 Total Score:      Little interest or pleasure in doing things?     Feeling down, depressed, or hopeless?     Trouble falling or staying asleep, or sleeping too much?     Feeling tired or having little energy?     Poor appetite or overeating?     Feeling bad about yourself - or that you are a failure or have let yourself or your family down?     Trouble concentrating on things, such as reading the newspaper or watching television?     Moving or speaking so slowly that other people could have noticed? Or the opposite - being so fidgety or restless that you have been moving around a lot more than usual?     Thoughts that you would be better off dead, or of hurting yourself in some " way?     PHQ-9 Total Score       VITOR-7       Past Surgical History:  Past Surgical History:   Procedure Laterality Date   • COLONOSCOPY  Age 52   • EYE SURGERY      Lasik to correct vision       Problem List:  Patient Active Problem List   Diagnosis   • Depression with anxiety   • Hip pain       Allergy:   Allergies   Allergen Reactions   • Sulfa Antibiotics Unknown - Low Severity        Discontinued Medications:  Medications Discontinued During This Encounter   Medication Reason   • lithium carbonate 150 MG capsule Reorder       Current Medications:   Current Outpatient Medications   Medication Sig Dispense Refill   • acetaminophen (TYLENOL) 500 MG tablet Take 1 tablet by mouth Every 6 (Six) Hours As Needed for Mild Pain.     • buPROPion SR (WELLBUTRIN SR) 100 MG 12 hr tablet TAKE 1 TABLET BY MOUTH TWICE DAILY 180 tablet 1   • clonazePAM (KlonoPIN) 1 MG tablet Take 1 tablet by mouth 3 (Three) Times a Day As Needed for Anxiety. 90 tablet 5   • doxepin (SINEquan) 25 MG capsule TAKE 1 CAPSULE BY MOUTH AT BEDTIME 90 capsule 1   • lithium carbonate 150 MG capsule Take 2 capsules by mouth Every Night. 60 capsule 2   • polyethylene glycol (MIRALAX) 17 g packet Take 17 g by mouth Daily.       No current facility-administered medications for this visit.       Past Medical History:  Past Medical History:   Diagnosis Date   • Anxiety    • Depression        Past Psychiatric History:  Began Treatment: a few years ago  Diagnoses: dep and anx, bipolar 2  Psychiatrist: [facility]  Therapist: yes briefly  Admission History:Denies  Medication Trials:    Multiple, can't remember inidivdual effects    Only one that helped me get back to my old self was zoloft during those six months    Self Harm: Denies  Suicide Attempts:Denies   Psychosis, Anxiety, Depression: Denies    Substance Abuse History:   Types:Denies all, including illicit  Withdrawal Symptoms:Denies  Longest Period Sober:Not Applicable   AA: Not applicable  "    Social History:  Martial Status:  Employed:No  Kids:Yes  House:Lives in a house   History: Denies    Social History     Socioeconomic History   • Marital status:    Tobacco Use   • Smoking status: Never   • Smokeless tobacco: Never   Vaping Use   • Vaping Use: Never used   Substance and Sexual Activity   • Alcohol use: Never   • Drug use: Never   • Sexual activity: Not Currently     Partners: Male     Birth control/protection: Surgical, Tubal ligation       Family History:   Suicide Attempts: Denies  Suicide Completions:Denies      Family History   Problem Relation Age of Onset   • Bipolar disorder Sister         [id number] ()       Developmental History:       Childhood: Denies Abuse  High School:Completed  College: denies    · Mental Status Exam  · Appearance  · : groomed, good eye contact, normal street clothes  · Behavior  · : pleasant and cooperative  · Motor  · : No abnormal  · Speech  · : very talkative, some periods of silence, normal rhythm, rate, volume, tone, not hyperverbal, not pressured, normal prosidy  · Mood  · : \"I'm a worry wart\"  · Affect  · : euthymic, mood congruent, good variability  · Thought Content  · : negative suicidal ideations, negative homicidal ideations, negative obsessions  · Perceptions  · : negative auditory hallucinations, negative visual hallucinations  · Thought Process  · : not as tangential  · Insight/Judgement  · : Fair/fair  · Cognition  · : grossly intact  · Attention   : intact      Review of Systems:  Review of Systems   Constitutional: Positive for fatigue. Negative for diaphoresis and fever.   HENT: Negative for drooling.    Eyes: Positive for visual disturbance.   Respiratory: Negative for cough and shortness of breath.    Cardiovascular: Negative for chest pain, palpitations and leg swelling.   Gastrointestinal: Positive for constipation. Negative for abdominal pain, nausea and vomiting.   Endocrine: Negative for cold intolerance and " heat intolerance.   Genitourinary: Negative for difficulty urinating.   Musculoskeletal: Negative for back pain and joint swelling.   Allergic/Immunologic: Negative for immunocompromised state.   Neurological: Positive for dizziness and headaches. Negative for seizures, syncope, speech difficulty, weakness, light-headedness and numbness.   Psychiatric/Behavioral: Negative for confusion.       Physical Exam:  Physical Exam    Vital Signs:   /78   Pulse 90   Wt 64.4 kg (142 lb)   BMI 25.15 kg/m²      Lab Results:   No visits with results within 6 Month(s) from this visit.   Latest known visit with results is:   Hospital Outpatient Visit on 06/03/2021   Component Date Value Ref Range Status   • WBC 06/03/2021 6.23  4.80 - 10.80 10*3/uL Final   • RBC 06/03/2021 4.01 (L)  4.20 - 5.40 10*6/uL Final   • Hemoglobin 06/03/2021 12.1  12.0 - 16.0 g/dL Final   • Hematocrit 06/03/2021 38.1  37.0 - 47.0 % Final   • MCV 06/03/2021 95.0  81.0 - 99.0 fL Final   • MCH 06/03/2021 30.2  27.0 - 31.0 pg Final   • MCHC 06/03/2021 31.8 (L)  33.0 - 37.0 Final   • RDW 06/03/2021 12.8  11.7 - 14.4 % Final   • Platelets 06/03/2021 218  130 - 400 10*3/uL Final   • MPV 06/03/2021 12.1  9.4 - 12.3 fL Final   • Neutrophil Rel % 06/03/2021 59.0  30.0 - 85.0 % Final   • Lymphocyte Rel % 06/03/2021 28.3  20.0 - 45.0 % Final   • Monocyte Rel % 06/03/2021 8.2  3.0 - 10.0 % Final   • Eosinophil Rel % 06/03/2021 3.2  0.0 - 7.0 % Final   • Basophil Rel % 06/03/2021 1.0  0.0 - 3.0 % Final   • Neutrophils Absolute 06/03/2021 3.68  2.00 - 8.00 10*3/uL Final   • Lymphocytes Absolute 06/03/2021 1.76  1.00 - 5.00 10*3/uL Final   • Monocytes Absolute 06/03/2021 0.51  0.20 - 1.20 10*3/uL Final   • Eosinophils Absolute 06/03/2021 0.20  0.00 - 0.70 10*3/uL Final   • Basophils Absolute 06/03/2021 0.06  0.00 - 0.20 10*3/uL Final   • Immature Granulocyte Rel % 06/03/2021 0.3  0.0 - 1.8 % Final   • Abs Imm Gran 06/03/2021 0.02  0.00 - 0.20 10*3/uL Final   •  RDW-SD 06/03/2021 44.8  36.4 - 46.3 fL Final   • NRBC 06/03/2021 0.00  0.00 - 0.70 % Final   • Hemoglobin A1C 06/03/2021 5.5  3.5 - 5.7 % Final    Comment: **Interpretation**  <7% in Controlled Diabetic Patients.  Assay Range 3.4-18.2%  ADA 2010 Standards of Medical Care in Diabetes suggest using  a cut point of >= 6.5% for diagnosis of diabetes and an A1C  range of 5.7%-6.4% as a category of increased risk for future  diabetes.     • Mean Bld Glu Estim. 06/03/2021 111  mg/dL Final   • Free T4 06/03/2021 1.1  0.9 - 1.8 ng/dL Final   • TSH 06/03/2021 1.630  0.270 - 4.200 m[iU]/L Final   • Triglycerides 06/03/2021 106  40 - 150 mg/dL Final    Comment: <150 mg/dL-Normal  150-199 mg/dL-Borderline High  200-499 mg/dL-High  >500 mg/dL- Very High     • Total Cholesterol 06/03/2021 216 (H)  107 - 200 mg/dL Final    Comment: <200 mg/dL-Desirable  200-239 mg/dL-Borderline High  >240 mg/dL-High  >500 mg/dL-Very High     • HDL Cholesterol 06/03/2021 68 (H)  40 - 60 mg/dL Final    Comment: <40 mg/dL-Low  >60 mg/dL- Desirable     • Chol/HDL Ratio 06/03/2021 3.2  3.0 - 6.0 NA Final   • LDL Cholesterol  06/03/2021 127 (H)  70 - 100 mg/dL Final    Comment: Recommended  LDL Levels  <100 mg/dL-Optimal  100-129 mg/dL-Near Optimal/above optimal  130-159 mg/dL-Borderline High  160-189 mg/dL-High  >190 mg/dL-Very High     • VLDL Cholesterol 06/03/2021 21  5 - 37 mg/dL Final   • Glucose 06/03/2021 101 (H)  65 - 99 mg/dL Final   • BUN 06/03/2021 11  5 - 25 mg/dL Final   • Creatinine 06/03/2021 0.90  0.50 - 0.90 mg/dL Final   • BUN/Creatinine Ratio 06/03/2021 12  6 - 20 [ratio] Final   • GFR 06/03/2021 >60  >60 mL/min/[1.73_m2] Final    Comment: Interpretative Data:  ------------------------------------  STAGE                  GFR  Stage 1                90 mL/min or greater  Stage 2                60-89 mL/min  Stage 3                30-59 mL/min  Stage 4                15-29 mL/min  Value <60 mL/min for 3 or more months is defined as  CKD.     • Sodium 06/03/2021 140  135 - 147 mmol/L Final   • Potassium 06/03/2021 4.3  3.5 - 5.3 mmol/L Final   • Chloride 06/03/2021 101  99 - 111 mmol/L Final   • CO2 06/03/2021 26  22 - 32 mmol/L Final   • Anion Gap 06/03/2021 17  8 - 19 mmol/L Final   • OSMOLALITY CALC 06/03/2021 290  273 - 304 Final   • Total Protein 06/03/2021 7.8  6.3 - 8.2 g/dL Final    Comment: If Patient is receiving dextran as a blood volume expander  result may show a potential interference.     • Albumin 06/03/2021 4.7  3.5 - 5.0 g/dL Final   • Globulin 06/03/2021 3.1  2.0 - 3.5 g/dL Final   • A/G Ratio 06/03/2021 1.5  1.4 - 2.6 [ratio] Final   • Calcium 06/03/2021 9.5  8.7 - 10.4 mg/dL Final   • Alkaline Phosphatase 06/03/2021 81  43 - 160 U/L Final   • ALT (SGPT) 06/03/2021 19  10 - 40 U/L Final   • AST (SGOT) 06/03/2021 24  15 - 50 U/L Final   • Total Bilirubin 06/03/2021 0.41  0.20 - 1.30 mg/dL Final   • 25 Hydroxy, Vitamin D 06/03/2021 36.8  30.0 - 100.0 ng/mL Final    Comment: Vitamin D Status          Range  ---------------------------------------  Deficiency                <10 ng/mL  Insufficiency             10-30 ng/mL  Sufficiency                ng/mL  Toxicity                  >100 ng/mL     • Vitamin B-12 06/03/2021 695  211 - 911 pg/mL Final   • Folate 06/03/2021 >20.0  4.8 - 20.0 ng/mL Final    Comment: Results may be falsely decreased due to light exposure if  testing added on after collection.     • T3, Free 06/03/2021 3.0  2.0 - 4.4 pg/mL Final       EKG Results:  No orders to display       Imaging Results:  No Images in the past 120 days found..      Assessment & Plan   Diagnoses and all orders for this visit:    1. Major depressive disorder, recurrent episode, moderate (HCC) (Primary)  -     lithium carbonate 150 MG capsule; Take 2 capsules by mouth Every Night.  Dispense: 60 capsule; Refill: 2    2. Generalized anxiety disorder  -     lithium carbonate 150 MG capsule; Take 2 capsules by mouth Every Night.   Dispense: 60 capsule; Refill: 2    3. Insomnia due to mental condition  -     lithium carbonate 150 MG capsule; Take 2 capsules by mouth Every Night.  Dispense: 60 capsule; Refill: 2        Visit Diagnoses:    ICD-10-CM ICD-9-CM   1. Major depressive disorder, recurrent episode, moderate (HCC)  F33.1 296.32   2. Generalized anxiety disorder  F41.1 300.02   3. Insomnia due to mental condition  F51.05 300.9     327.02     3/7: [place] today. Longer appnt. Seems more hypomanic now. Increase lithium, watch dry lip. SE with every med? 17 minutes of supportive psychotherapy with goal to strengthen defenses, promote problems solving, restore adaptive functioning and provide symptom relief. The therapeutic alliance was strengthened to encourage the patient to express their thoughts and feelings. Esteem building was enhanced through praise, reassurance, normalizing and encouragement. Coping skills were enhanced to build distress tolerance skills and emotional regulation. Allowed patient to freely discuss issues without interruption or judgement with unconditional positive regard, active listening skills, and empathy. Provided a safe, confidential environment to facilitate the development of a positive therapeutic relationship and encourage open, honest communication. Assisted patient in identifying risk factors which would indicate the need for higher level of care including thoughts to harm self or others and/or self-harming behavior and encouraged patient to contact this office, call 911, or present to the nearest emergency room should any of these events occur. Assisted patient in processing session content; acknowledged and normalized patient’s thoughts, feelings, and concerns by utilizing a person-centered approach in efforts to build appropriate rapport and a positive therapeutic relationship with open and honest communication. Patient given education on medication side effects, diagnosis/illness and relapse symptoms.  Plan to continue supportive psychotherapy in next appointment to provide symptom relief.  Diagnoses: as above  Symptoms: as above  Functional status: good  Mental Status Exam: as above    Treatment plan: Medication management and supportive psychotherapy  Prognosis: good  Progress: a little worse  3/31      2/20: [place] at next visit, then neuropsych testing. Seems hypomanic, improved but not tolerating abilify. Stop abilify and start lithium qhs. 17   Prognosis: fair  Progress: seems quite improved  3/7      1/17: Increase abilify, a bit more linear. Answered questions more accurately. Appears bipolar hypomanic. 17   Prognosis: fair to good  Progress: minimal progress  6 wks      12/22: Very talkative and tangential, hard to get anything out of the patient.  Trouble with answering questions, avoids saying yes or no to yes or no questions.  Start Abilify and see back in 4 weeks.  Request records from [facility].  Unclear what the diagnosis is but it could be bipolar.    PLAN:  56. Safety: No acute safety concerns  57. Therapy: None  58. Risk Assessment: Risk of self-harm acutely is moderate.  Risk factors include anxiety disorder, mood disorder, access to weapons and recent psychosocial stressors (pandemic). Protective factors include no family history, no present SI, no history of suicide attempts or self-harm in the past, minimal AODA, healthcare seeking, future orientation, willingness to engage in care.  Risk of self-harm chronically is also moderate, but could be further elevated in the event of treatment noncompliance and/or AODA.  59. Meds:  a. STOP Abilify 2 to 5 mg nightly. Akathisia.   b. INCREASE lithium 150 to 300 mg nightly. Risks, benefits, alternatives discussed with patient including ataxia, sedation, dizziness/falls risk, dysarthria, tremor, possible nephrogenic diabetes insipidus, diarrhea, nausea, weight gain, rash, and possible hypothyroid goiter.  Patient also counseled to monitor hydration status and  sodium intake as changes can result in rapidly increasing, even toxic, levels of lithium. Patient also counseled regarding the use of diuretics and their ability to increase lithium levels. After discussion of these risks and benefits, the patient voiced understanding and agreed to proceed.  c. CONTINUE Wellbutrin  mg bid. Risks, benefits, alternatives discussed with patient including nausea, GI upset, increased energy, exacerbation of irritability, insomnia, lowering of seizure threshold.  After discussion of these risks and benefits, the patient voiced understanding and agreed to proceed.  d. CONTINUE clonazepam 1 mg tid prn. Risks, benefits, and alternatives discussed with patient including sedation/falls risk, dizziness, disinhibition, headache, fatigue, dry mouth, blurred vision, constipation, nausea, diarrhea, heartburn, unusual taste in mouth, urinary retention, increased appetite, restlessness, sexual dysfunction, sweating, weight gain, cardiac arrhythmias, seizures, and fall risk.  After discussion of these risks and benefits, patient voiced understanding and agreed to proceed.  [place] reviewed, UDS ordered, and controlled substance agreement signed & witnessed.  e. CONTINUE  Doxepin 25 ng nightly. Risks, benefits, alternatives discussed with patient including dizziness/falls risk, sedation, GI upset, constipation, possible blurred vision.  After discussion of these risks and benefits, the patient voiced understanding and agreed to proceed.  60. Labs: None    Patient screened positive for depression based on a PHQ-9 score of  on . Follow-up recommendations include: Prescribed antidepressant medication treatment and Suicide Risk Assessment performed.           TREATMENT PLAN/GOALS: Continue supportive psychotherapy efforts and medications as indicated. Treatment and medication options discussed during today's visit. Patient acknowledged and verbally consented to continue with current treatment plan and  was educated on the importance of compliance with treatment and follow-up appointments.    MEDICATION ISSUES:  BRYNN reviewed as expected.  Discussed medication options and treatment plan of prescribed medication as well as the risks, benefits, and side effects including potential falls, possible impaired driving and metabolic adversities among others. Patient is agreeable to call the office with any worsening of symptoms or onset of side effects. Patient is agreeable to call 911 or go to the nearest ER should he/she begin having SI/HI. No medication side effects or related complaints today.     MEDS ORDERED DURING VISIT:  New Medications Ordered This Visit   Medications   • lithium carbonate 150 MG capsule     Sig: Take 2 capsules by mouth Every Night.     Dispense:  60 capsule     Refill:  2     increaseing dose. Thx 4 all u do.       Return in about 24 days (around 3/31/2023) for 1:00.         This document has been electronically signed by Kai Elliott MD  March 7, 2023 15:04 EST    Dictated Utilizing Dragon Dictation: Part of this note may be an electronic transcription/translation of spoken language to printed text using the Dragon Dictation System.

## 2023-03-08 ENCOUNTER — DOCUMENTATION (OUTPATIENT)
Dept: PSYCHIATRY | Facility: CLINIC | Age: 68
End: 2023-03-08
Payer: MEDICARE

## 2023-03-08 NOTE — PROGRESS NOTES
"MoCA is a 27 out of 30.  1 points off for the copy of the cube, 1 point off for attentional test involving tapping her hand whenever the letter \"A\" is said out loud, 1 point off for language as the patient did not repeat the long sentence correctly, 1 point off her delayed recall.  Patient got one point added back on for high school level education.    I called the patient and informed her.    To be scanned into the system.  "

## 2023-03-31 ENCOUNTER — OFFICE VISIT (OUTPATIENT)
Dept: PSYCHIATRY | Facility: CLINIC | Age: 68
End: 2023-03-31
Payer: MEDICARE

## 2023-03-31 VITALS
HEART RATE: 77 BPM | WEIGHT: 139.4 LBS | HEIGHT: 63 IN | SYSTOLIC BLOOD PRESSURE: 116 MMHG | BODY MASS INDEX: 24.7 KG/M2 | DIASTOLIC BLOOD PRESSURE: 66 MMHG

## 2023-03-31 DIAGNOSIS — F51.05 INSOMNIA DUE TO MENTAL CONDITION: ICD-10-CM

## 2023-03-31 DIAGNOSIS — F41.1 GENERALIZED ANXIETY DISORDER: ICD-10-CM

## 2023-03-31 DIAGNOSIS — F33.1 MAJOR DEPRESSIVE DISORDER, RECURRENT EPISODE, MODERATE: Primary | ICD-10-CM

## 2023-03-31 PROCEDURE — 99214 OFFICE O/P EST MOD 30 MIN: CPT | Performed by: STUDENT IN AN ORGANIZED HEALTH CARE EDUCATION/TRAINING PROGRAM

## 2023-03-31 PROCEDURE — 1159F MED LIST DOCD IN RCRD: CPT | Performed by: STUDENT IN AN ORGANIZED HEALTH CARE EDUCATION/TRAINING PROGRAM

## 2023-03-31 PROCEDURE — 90833 PSYTX W PT W E/M 30 MIN: CPT | Performed by: STUDENT IN AN ORGANIZED HEALTH CARE EDUCATION/TRAINING PROGRAM

## 2023-03-31 PROCEDURE — 1160F RVW MEDS BY RX/DR IN RCRD: CPT | Performed by: STUDENT IN AN ORGANIZED HEALTH CARE EDUCATION/TRAINING PROGRAM

## 2023-03-31 NOTE — PROGRESS NOTES
"Subjective   Alissa Lyudmila Bruce is a 67 y.o. female who presents today for initial evaluation     Referring Provider:  Delfina Quevedo APRN  [address] [place] [address]    Chief Complaint: Anxiety    History of Present Illness:     : Chart review: Patient has a history of anxiety for several years, followed by [facility] during this time for medication management with little success.  Sent to us for a second opinion.  On Wellbutrin  twice daily, doxepin 25 nightly, Klonopin 1 mg p.o. 3 times daily as needed, but she takes about 1-1/2 tabs daily.  Nothing in PDMP.  No Care Everywhere.  Older labs from 2021 show reassuring CMP, thyroid studies, folate, iron studies, elevated lipids, reassuring B12, vitamin D, CBC.  No head imaging, EKG.    Psychotropic medication review includes Ativan, Vraylar, mirtazapine, Seroquel XR, Celexa, Cymbalta, Latuda, Adderall XR, Rexulti, Prozac, Abilify, Lamictal, BuSpar, Zoloft, Lexapro, Effexor.  There may have been others prior to [facility] management.    \"Alissa\" and \"Feng\"   Most concerning symptom: anxiety  Consider auvelity, MAOIs  Patient Notes:  1. Patient goals:  a. Get back to gardening  2. Coping:  3. Lifestyle changes:  4. Self-compassion:   5. Maladaptive thinkin. Improving interpersonal relationships:  7. Expressing difficult emotions:  8. Taking the perspective of others:        3/31: In person interview:  9. Chart review: No new.  10. Planning: [place] today 27 out of 30. Longer appnt. Seems more hypomanic now. Increase lithium, watch dry lip. SE with every med?  a. Emphasize the importance of medication compliance.  Otherwise, what happened at [facility] is going to happen with us.  11. \"It's much better.\"  a. I've been getting out of the house.   12. Mood/Depression: good  13. Anxiety: less fear, it's better, much better  a. Clonazepam helps, but doesn't need it. Hasn't taken it in a while.   14. Panic attacks: yes, controlled on klonopin.  15. Energy: " "more  16. Concentration: Good  17. Sleeping: Fairly well  18. Eating: Stable  19. Refills: No  20. Substances: No  21. Therapy: No  22. Medication compliant: y  23. No SI HI AVH.      3/7: In person interview:  24. Chart review: No new.  25. Planning: [place] at next visit, then neuropsych testing. Seems hypomanic, improved but not tolerating abilify. Stop abilify and start lithium qhs.  26. \"My lips and eyes are dry.\"  a. I haven't noticed any change as far as any improvement.  b. I think my lip is dryer.  c. Had nightmares initially when taking it.  27. Mood/Depression: stable  28. Anxiety: stable  29. Panic attacks: n  30. Energy: stable  31. Concentration: stable  32. Sleeping: fairly good, (again, stopped CPAP machine)  33. Eatin lb wg   34. Refills: y  35. Substances: n  36. Therapy: n  37. Medication compliant: y  38. No SI HI AVH.      : In person interview:  39. Chart review: Patient discontinued Abilify stating that it made her anxious.  Urgent appointment.  40. Planning: Increase abilify, a bit more linear. Answered questions more accurately. Appears bipolar hypomanic.  a. Depakote or lithium?  b. Could also add propranolol for akathisia to see if that helps her tolerate Abilify better.  c. We want to give each medication a good try.  We do not want to get into the cycle of trying and failing medications too quickly.  d. Neuropsych testing.  41. \"Abilify was just making me more anxious.\"  a. Not talking over me at all. Still a little tangential, but even this has improved. Still talkative.  b. Periods of silence.  c. Stopped abilify over a week ago.  d. My sister was on lithium before  42. Mood/Depression:  a. I am never really depressed  43. Anxiety:  a. I get nervous going out.  b. I was more irritable  44. Panic attacks: n  45. Energy: poor  46. Concentration: poor  47. Sleeping:   a. Well at times  b. Not using CPAP machine  48. Eating: stable  49. Refills: y  50. Substances: n  51. Therapy: " "n  52. Medication compliant: y  53. No SI HI AVH.      1/17: In person interview:  54. Chart review: No new.  55. Planning: Very difficult patient.  Started Abilify at last visit.  56. \"I couldn't tell much of a difference.\"  a. Records from Delfina Quevedo, but not from Kamilah Dee, which have been requested.  b. Switched taking the abilify to the mornings.  c. Most concerning symptom is \"anxiety\"  d. Chronic constipation  57. Mood/Depression:  58. Anxiety: feel anxious  59. Panic attacks:  60. Energy: tired all the time, for a long time  a. Unsure if it is related to abilify  b. It's been 5 years  61. Concentration: not at goal  62. Sleeping:   a. On CPAP  63. Eating: 3 lb wg 12/22  64. Refills: y  65. Substances: n  66. Therapy: n  67. Medication compliant: y  a. Take klonopin 1.5 total daily  68. No SI HI AVH.      12/22: In person.  Interview:  69. Chart review:   a. Would rule out ADHD.  b. Would rule out PTSD, consider guanfacine, clonidine, prazosin.  70. His/Her Story: \"I was working with Delfina Quevedo.\"  a. P15, G14  b. Had been working with Kamilah Dee also, and Dr. Rodrigo guerra. I never even saw Delfina -- maybe 2 years  d. Talks over me, talkative, but interruptible, long silences too  e. One day I woke up and \"something wasn't right\" 5 years ago  f. Didn't like the ativan  g. 6 mos I was perfectly fine: 2018 (jan to June)?  h. 2017, multiple losses, when it all began  i. Discussed how klonopin is addictive  j. Everything that I do \"excites me\"  k. Most of the day I watch TV  71. Depression/Mood:  a. Depressed mood, anhedonia, hopelessness, poor energy, poor concentration, weight loss or weight gain, psychomotor retardation or agitation, insomnia controlled on clonazepam.  b. Seasonal pattern:  c. Severity: Moderate  d. Duration: 5 years ago  72. Anxiety:  a. Uncontrolled worrying, muscle tension, fatigue, poor concentration, feeling on edge, irritability, insomnia.  b. Severity: Moderate to " severe  c. Duration: 5 years ago  73. Panic attacks: n  74. Psych ROS: Positive for depression, anxiety.  Negative for psychosis and williams.  a. ADHD: Deferred  b. PTSD: Deferred  75. No SI HI AVH.  76. Substances: No  77. Therapy: Briefly  78. Medication compliant: y    Access to Firearms: Yes, locked away    PHQ-9 Depression Screening  PHQ-9 Total Score:      Little interest or pleasure in doing things?     Feeling down, depressed, or hopeless?     Trouble falling or staying asleep, or sleeping too much?     Feeling tired or having little energy?     Poor appetite or overeating?     Feeling bad about yourself - or that you are a failure or have let yourself or your family down?     Trouble concentrating on things, such as reading the newspaper or watching television?     Moving or speaking so slowly that other people could have noticed? Or the opposite - being so fidgety or restless that you have been moving around a lot more than usual?     Thoughts that you would be better off dead, or of hurting yourself in some way?     PHQ-9 Total Score       VITOR-7       Past Surgical History:  Past Surgical History:   Procedure Laterality Date   • COLONOSCOPY  Age 52   • EYE SURGERY  2004    Lasik to correct vision       Problem List:  Patient Active Problem List   Diagnosis   • Depression with anxiety   • Hip pain       Allergy:   Allergies   Allergen Reactions   • Sulfa Antibiotics Unknown - Low Severity        Discontinued Medications:  There are no discontinued medications.    Current Medications:   Current Outpatient Medications   Medication Sig Dispense Refill   • acetaminophen (TYLENOL) 500 MG tablet Take 1 tablet by mouth Every 6 (Six) Hours As Needed for Mild Pain.     • buPROPion SR (WELLBUTRIN SR) 100 MG 12 hr tablet TAKE 1 TABLET BY MOUTH TWICE DAILY 180 tablet 1   • clonazePAM (KlonoPIN) 1 MG tablet Take 1 tablet by mouth 3 (Three) Times a Day As Needed for Anxiety. 90 tablet 5   • doxepin (SINEquan) 25 MG  capsule TAKE 1 CAPSULE BY MOUTH AT BEDTIME 90 capsule 1   • lithium carbonate 150 MG capsule Take 2 capsules by mouth Every Night. 60 capsule 2   • polyethylene glycol (MIRALAX) 17 g packet Take 17 g by mouth Daily.       No current facility-administered medications for this visit.       Past Medical History:  Past Medical History:   Diagnosis Date   • Anxiety    • Depression        Past Psychiatric History:  Began Treatment: a few years ago  Diagnoses: dep and anx, bipolar 2  Psychiatrist: [facility]  Therapist: yes briefly  Admission History:Denies  Medication Trials:    Multiple, can't remember inidivdual effects    Only one that helped me get back to my old self was zoloft during those six months    Self Harm: Denies  Suicide Attempts:Denies   Psychosis, Anxiety, Depression: Denies    Substance Abuse History:   Types:Denies all, including illicit  Withdrawal Symptoms:Denies  Longest Period Sober:Not Applicable   AA: Not applicable     Social History:  Martial Status:  Employed:No  Kids:Yes  House:Lives in a house   History: Denies    Social History     Socioeconomic History   • Marital status:    Tobacco Use   • Smoking status: Never   • Smokeless tobacco: Never   Vaping Use   • Vaping Use: Never used   Substance and Sexual Activity   • Alcohol use: Never   • Drug use: Never   • Sexual activity: Not Currently     Partners: Male     Birth control/protection: Surgical, Tubal ligation       Family History:   Suicide Attempts: Denies  Suicide Completions:Denies      Family History   Problem Relation Age of Onset   • Bipolar disorder Sister         [id number] ()       Developmental History:       Childhood: Denies Abuse  High School:Completed  College: denies    · Mental Status Exam  · Appearance  · : groomed, good eye contact, normal street clothes  · Behavior  · : pleasant and cooperative  · Motor  · : No abnormal  · Speech  · : Some interrupting, but otherwise normal rhythm, rate,  "volume, tone, not hyperverbal, not pressured, normal prosidy  · Mood  · : \"I have not felt like this in 5 years\"  · Affect  · : euthymic, mood congruent, good variability  · Thought Content  · : negative suicidal ideations, negative homicidal ideations, negative obsessions  · Perceptions  · : negative auditory hallucinations, negative visual hallucinations  · Thought Process  · : not as tangential  · Insight/Judgement  · : Fair/fair  · Cognition  · : grossly intact  · Attention   : intact      Review of Systems:  Review of Systems   Constitutional: Positive for fatigue. Negative for diaphoresis.   HENT: Negative for drooling.    Eyes: Negative for visual disturbance.   Respiratory: Negative for cough and shortness of breath.    Cardiovascular: Negative for chest pain, palpitations and leg swelling.   Gastrointestinal: Positive for constipation. Negative for abdominal pain, nausea and vomiting.   Endocrine: Negative for cold intolerance and heat intolerance.   Genitourinary: Negative for difficulty urinating.   Musculoskeletal: Negative for joint swelling.   Allergic/Immunologic: Negative for immunocompromised state.   Neurological: Positive for headaches. Negative for dizziness, seizures, speech difficulty, weakness, light-headedness and numbness.   Psychiatric/Behavioral: Negative for confusion.       Physical Exam:  Physical Exam    Vital Signs:   /66   Pulse 77   Ht 160 cm (63\")   Wt 63.2 kg (139 lb 6.4 oz)   BMI 24.69 kg/m²      Lab Results:   No visits with results within 6 Month(s) from this visit.   Latest known visit with results is:   Hospital Outpatient Visit on 06/03/2021   Component Date Value Ref Range Status   • WBC 06/03/2021 6.23  4.80 - 10.80 10*3/uL Final   • RBC 06/03/2021 4.01 (L)  4.20 - 5.40 10*6/uL Final   • Hemoglobin 06/03/2021 12.1  12.0 - 16.0 g/dL Final   • Hematocrit 06/03/2021 38.1  37.0 - 47.0 % Final   • MCV 06/03/2021 95.0  81.0 - 99.0 fL Final   • MCH 06/03/2021 30.2  27.0 " - 31.0 pg Final   • MCHC 06/03/2021 31.8 (L)  33.0 - 37.0 Final   • RDW 06/03/2021 12.8  11.7 - 14.4 % Final   • Platelets 06/03/2021 218  130 - 400 10*3/uL Final   • MPV 06/03/2021 12.1  9.4 - 12.3 fL Final   • Neutrophil Rel % 06/03/2021 59.0  30.0 - 85.0 % Final   • Lymphocyte Rel % 06/03/2021 28.3  20.0 - 45.0 % Final   • Monocyte Rel % 06/03/2021 8.2  3.0 - 10.0 % Final   • Eosinophil Rel % 06/03/2021 3.2  0.0 - 7.0 % Final   • Basophil Rel % 06/03/2021 1.0  0.0 - 3.0 % Final   • Neutrophils Absolute 06/03/2021 3.68  2.00 - 8.00 10*3/uL Final   • Lymphocytes Absolute 06/03/2021 1.76  1.00 - 5.00 10*3/uL Final   • Monocytes Absolute 06/03/2021 0.51  0.20 - 1.20 10*3/uL Final   • Eosinophils Absolute 06/03/2021 0.20  0.00 - 0.70 10*3/uL Final   • Basophils Absolute 06/03/2021 0.06  0.00 - 0.20 10*3/uL Final   • Immature Granulocyte Rel % 06/03/2021 0.3  0.0 - 1.8 % Final   • Abs Imm Gran 06/03/2021 0.02  0.00 - 0.20 10*3/uL Final   • RDW-SD 06/03/2021 44.8  36.4 - 46.3 fL Final   • NRBC 06/03/2021 0.00  0.00 - 0.70 % Final   • Hemoglobin A1C 06/03/2021 5.5  3.5 - 5.7 % Final    Comment: **Interpretation**  <7% in Controlled Diabetic Patients.  Assay Range 3.4-18.2%  ADA 2010 Standards of Medical Care in Diabetes suggest using  a cut point of >= 6.5% for diagnosis of diabetes and an A1C  range of 5.7%-6.4% as a category of increased risk for future  diabetes.     • Mean Bld Glu Estim. 06/03/2021 111  mg/dL Final   • Free T4 06/03/2021 1.1  0.9 - 1.8 ng/dL Final   • TSH 06/03/2021 1.630  0.270 - 4.200 m[iU]/L Final   • Triglycerides 06/03/2021 106  40 - 150 mg/dL Final    Comment: <150 mg/dL-Normal  150-199 mg/dL-Borderline High  200-499 mg/dL-High  >500 mg/dL- Very High     • Total Cholesterol 06/03/2021 216 (H)  107 - 200 mg/dL Final    Comment: <200 mg/dL-Desirable  200-239 mg/dL-Borderline High  >240 mg/dL-High  >500 mg/dL-Very High     • HDL Cholesterol 06/03/2021 68 (H)  40 - 60 mg/dL Final    Comment: <40  mg/dL-Low  >60 mg/dL- Desirable     • Chol/HDL Ratio 06/03/2021 3.2  3.0 - 6.0 NA Final   • LDL Cholesterol  06/03/2021 127 (H)  70 - 100 mg/dL Final    Comment: Recommended  LDL Levels  <100 mg/dL-Optimal  100-129 mg/dL-Near Optimal/above optimal  130-159 mg/dL-Borderline High  160-189 mg/dL-High  >190 mg/dL-Very High     • VLDL Cholesterol 06/03/2021 21  5 - 37 mg/dL Final   • Glucose 06/03/2021 101 (H)  65 - 99 mg/dL Final   • BUN 06/03/2021 11  5 - 25 mg/dL Final   • Creatinine 06/03/2021 0.90  0.50 - 0.90 mg/dL Final   • BUN/Creatinine Ratio 06/03/2021 12  6 - 20 [ratio] Final   • GFR 06/03/2021 >60  >60 mL/min/[1.73_m2] Final    Comment: Interpretative Data:  ------------------------------------  STAGE                  GFR  Stage 1                90 mL/min or greater  Stage 2                60-89 mL/min  Stage 3                30-59 mL/min  Stage 4                15-29 mL/min  Value <60 mL/min for 3 or more months is defined as CKD.     • Sodium 06/03/2021 140  135 - 147 mmol/L Final   • Potassium 06/03/2021 4.3  3.5 - 5.3 mmol/L Final   • Chloride 06/03/2021 101  99 - 111 mmol/L Final   • CO2 06/03/2021 26  22 - 32 mmol/L Final   • Anion Gap 06/03/2021 17  8 - 19 mmol/L Final   • OSMOLALITY CALC 06/03/2021 290  273 - 304 Final   • Total Protein 06/03/2021 7.8  6.3 - 8.2 g/dL Final    Comment: If Patient is receiving dextran as a blood volume expander  result may show a potential interference.     • Albumin 06/03/2021 4.7  3.5 - 5.0 g/dL Final   • Globulin 06/03/2021 3.1  2.0 - 3.5 g/dL Final   • A/G Ratio 06/03/2021 1.5  1.4 - 2.6 [ratio] Final   • Calcium 06/03/2021 9.5  8.7 - 10.4 mg/dL Final   • Alkaline Phosphatase 06/03/2021 81  43 - 160 U/L Final   • ALT (SGPT) 06/03/2021 19  10 - 40 U/L Final   • AST (SGOT) 06/03/2021 24  15 - 50 U/L Final   • Total Bilirubin 06/03/2021 0.41  0.20 - 1.30 mg/dL Final   • 25 Hydroxy, Vitamin D 06/03/2021 36.8  30.0 - 100.0 ng/mL Final    Comment: Vitamin D Status          " Range  ---------------------------------------  Deficiency                <10 ng/mL  Insufficiency             10-30 ng/mL  Sufficiency                ng/mL  Toxicity                  >100 ng/mL     • Vitamin B-12 06/03/2021 695  211 - 911 pg/mL Final   • Folate 06/03/2021 >20.0  4.8 - 20.0 ng/mL Final    Comment: Results may be falsely decreased due to light exposure if  testing added on after collection.     • T3, Free 06/03/2021 3.0  2.0 - 4.4 pg/mL Final       EKG Results:  No orders to display       Imaging Results:  No Images in the past 120 days found..      Assessment & Plan   Diagnoses and all orders for this visit:    1. Major depressive disorder, recurrent episode, moderate (HCC) (Primary)    2. Generalized anxiety disorder    3. Insomnia due to mental condition        Visit Diagnoses:    ICD-10-CM ICD-9-CM   1. Major depressive disorder, recurrent episode, moderate (HCC)  F33.1 296.32   2. Generalized anxiety disorder  F41.1 300.02   3. Insomnia due to mental condition  F51.05 300.9     327.02     3/31: Much improved, \"best I've been in 5 years.\" No changes now, low threshold to increase lithium to 450 mg. May need neuro workup in the past due to clear bipolar II late-emerging hypomania.  Watch sleeping closely, may need to increase doxepin.  17 minutes of supportive psychotherapy with goal to strengthen defenses, promote problems solving, restore adaptive functioning and provide symptom relief. The therapeutic alliance was strengthened to encourage the patient to express their thoughts and feelings. Esteem building was enhanced through praise, reassurance, normalizing and encouragement. Coping skills were enhanced to build distress tolerance skills and emotional regulation. Allowed patient to freely discuss issues without interruption or judgement with unconditional positive regard, active listening skills, and empathy. Provided a safe, confidential environment to facilitate the development of a " positive therapeutic relationship and encourage open, honest communication. Assisted patient in identifying risk factors which would indicate the need for higher level of care including thoughts to harm self or others and/or self-harming behavior and encouraged patient to contact this office, call 911, or present to the nearest emergency room should any of these events occur. Assisted patient in processing session content; acknowledged and normalized patient’s thoughts, feelings, and concerns by utilizing a person-centered approach in efforts to build appropriate rapport and a positive therapeutic relationship with open and honest communication. Patient given education on medication side effects, diagnosis/illness and relapse symptoms. Plan to continue supportive psychotherapy in next appointment to provide symptom relief.  Diagnoses: as above  Symptoms: as above  Functional status: Good  Mental Status Exam: as above    Treatment plan: Medication management and supportive psychotherapy  Prognosis: Good  Progress: Significant improvement  4 weeks      3/7: [place] today. Longer appnt. Seems more hypomanic now. Increase lithium, watch dry lip. SE with every med? 17   Progress: a little worse  3/31      2/20: [place] at next visit, then neuropsych testing. Seems hypomanic, improved but not tolerating abilify. Stop abilify and start lithium qhs. 17   Prognosis: fair  Progress: seems quite improved  3/7      1/17: Increase abilify, a bit more linear. Answered questions more accurately. Appears bipolar hypomanic. 17   Prognosis: fair to good  Progress: minimal progress  6 wks      12/22: Very talkative and tangential, hard to get anything out of the patient.  Trouble with answering questions, avoids saying yes or no to yes or no questions.  Start Abilify and see back in 4 weeks.  Request records from [facility].  Unclear what the diagnosis is but it could be bipolar.    PLAN:  79. Safety: No acute safety concerns  80. Therapy:  None  81. Risk Assessment: Risk of self-harm acutely is moderate.  Risk factors include anxiety disorder, mood disorder, access to weapons and recent psychosocial stressors (pandemic). Protective factors include no family history, no present SI, no history of suicide attempts or self-harm in the past, minimal AODA, healthcare seeking, future orientation, willingness to engage in care.  Risk of self-harm chronically is also moderate, but could be further elevated in the event of treatment noncompliance and/or AODA.  82. Meds:  a. STOP Abilify 2 to 5 mg nightly. Akathisia.   b. CONTINUE lithium 300 mg nightly. Risks, benefits, alternatives discussed with patient including ataxia, sedation, dizziness/falls risk, dysarthria, tremor, possible nephrogenic diabetes insipidus, diarrhea, nausea, weight gain, rash, and possible hypothyroid goiter.  Patient also counseled to monitor hydration status and sodium intake as changes can result in rapidly increasing, even toxic, levels of lithium. Patient also counseled regarding the use of diuretics and their ability to increase lithium levels. After discussion of these risks and benefits, the patient voiced understanding and agreed to proceed.  c. CONTINUE Wellbutrin  mg bid. Risks, benefits, alternatives discussed with patient including nausea, GI upset, increased energy, exacerbation of irritability, insomnia, lowering of seizure threshold.  After discussion of these risks and benefits, the patient voiced understanding and agreed to proceed.  d. CONTINUE clonazepam 1 mg tid prn. Risks, benefits, and alternatives discussed with patient including sedation/falls risk, dizziness, disinhibition, headache, fatigue, dry mouth, blurred vision, constipation, nausea, diarrhea, heartburn, unusual taste in mouth, urinary retention, increased appetite, restlessness, sexual dysfunction, sweating, weight gain, cardiac arrhythmias, seizures, and fall risk.  After discussion of these risks  and benefits, patient voiced understanding and agreed to proceed.  B[place] reviewed, UDS ordered, and controlled substance agreement signed & witnessed.  e. CONTINUE  Doxepin 25 ng nightly. Risks, benefits, alternatives discussed with patient including dizziness/falls risk, sedation, GI upset, constipation, possible blurred vision.  After discussion of these risks and benefits, the patient voiced understanding and agreed to proceed.  83. Labs: None    Patient screened positive for depression based on a PHQ-9 score of  on . Follow-up recommendations include: Prescribed antidepressant medication treatment and Suicide Risk Assessment performed.           TREATMENT PLAN/GOALS: Continue supportive psychotherapy efforts and medications as indicated. Treatment and medication options discussed during today's visit. Patient acknowledged and verbally consented to continue with current treatment plan and was educated on the importance of compliance with treatment and follow-up appointments.    MEDICATION ISSUES:  BRYNN reviewed as expected.  Discussed medication options and treatment plan of prescribed medication as well as the risks, benefits, and side effects including potential falls, possible impaired driving and metabolic adversities among others. Patient is agreeable to call the office with any worsening of symptoms or onset of side effects. Patient is agreeable to call 911 or go to the nearest ER should he/she begin having SI/HI. No medication side effects or related complaints today.     MEDS ORDERED DURING VISIT:  No orders of the defined types were placed in this encounter.      Return in about 27 days (around 4/27/2023) for 1:00.         This document has been electronically signed by Kai Elliott MD  March 31, 2023 15:14 EDT    Dictated Utilizing Dragon Dictation: Part of this note may be an electronic transcription/translation of spoken language to printed text using the Dragon Dictation System.

## 2023-03-31 NOTE — PATIENT INSTRUCTIONS
1.  Please return to clinic at your next scheduled visit.  Contact the clinic ([phone]) at least 24 hours prior in the event you need to cancel.  2.  Do no harm to yourself or others.    3.  Avoid alcohol and drugs.    4.  Take all medications as prescribed.  Please contact the clinic with any concerns. If you are in need of medication refills, please call the clinic at [phone].    5. Should you want to get in touch with your provider, Dr. Kai Elliott, please utilize [employer] or contact the office ([phone]), and staff will be able to page Dr. Elliott directly.  6.  In the event you have personal crisis, contact the following crisis numbers: Suicide Prevention Hotline 1-450.637.2244; TREASURE Helpline 8-852-425-KKDY; [facility] Emergency Room [phone]; text HELLO to 036059; or 732.     SPECIFIC RECOMMENDATIONS:     1.      Medications discussed at this encounter:                   - no changes     2.      Psychotherapy recommendations:

## 2023-04-27 ENCOUNTER — OFFICE VISIT (OUTPATIENT)
Dept: PSYCHIATRY | Facility: CLINIC | Age: 68
End: 2023-04-27
Payer: MEDICARE

## 2023-04-27 VITALS
HEART RATE: 77 BPM | SYSTOLIC BLOOD PRESSURE: 149 MMHG | DIASTOLIC BLOOD PRESSURE: 72 MMHG | BODY MASS INDEX: 24.34 KG/M2 | WEIGHT: 137.4 LBS | HEIGHT: 63 IN

## 2023-04-27 DIAGNOSIS — F33.1 MAJOR DEPRESSIVE DISORDER, RECURRENT EPISODE, MODERATE: Primary | ICD-10-CM

## 2023-04-27 DIAGNOSIS — F51.05 INSOMNIA DUE TO MENTAL CONDITION: ICD-10-CM

## 2023-04-27 DIAGNOSIS — F41.1 GENERALIZED ANXIETY DISORDER: ICD-10-CM

## 2023-04-27 RX ORDER — LITHIUM CARBONATE 150 MG/1
300 CAPSULE ORAL NIGHTLY
Qty: 180 CAPSULE | Refills: 1 | Status: SHIPPED | OUTPATIENT
Start: 2023-04-27

## 2023-04-27 NOTE — PROGRESS NOTES
"Subjective   Alissa Lyudmila Bruce is a 67 y.o. female who presents today for initial evaluation     Referring Provider:  No referring provider defined for this encounter.    Chief Complaint: Anxiety    History of Present Illness:     : Chart review: Patient has a history of anxiety for several years, followed by [facility] during this time for medication management with little success.  Sent to us for a second opinion.  On Wellbutrin  twice daily, doxepin 25 nightly, Klonopin 1 mg p.o. 3 times daily as needed, but she takes about 1-1/2 tabs daily.  Nothing in PDMP.  No Care Everywhere.  Older labs from 2021 show reassuring CMP, thyroid studies, folate, iron studies, elevated lipids, reassuring B12, vitamin D, CBC.  No head imaging, EKG.    Psychotropic medication review includes Ativan, Vraylar, mirtazapine, Seroquel XR, Celexa, Cymbalta, Latuda, Adderall XR, Rexulti, Prozac, Abilify, Lamictal, BuSpar, Zoloft, Lexapro, Effexor.  There may have been others prior to [facility] management.    \"Alissa\" and \"Feng\"   Most concerning symptom: anxiety  Consider auvelity, MAOIs  Patient Notes:  1. Patient goals:  a. Get back to gardening  2. Coping:  3. Lifestyle changes:  4. Self-compassion:   5. Maladaptive thinkin. Improving interpersonal relationships:  7. Expressing difficult emotions:  8. Taking the perspective of others:      : In person interview:  9. Chart review: No new.  10. Planning: Much improved, \"best I've been in 5 years.\" No changes now, low threshold to increase lithium to 450 mg. May need neuro workup in the past due to clear bipolar II late-emerging hypomania.  Watch sleeping closely, may need to increase doxepin.   11. \"Good, real good.\"  a. I'm in a good spot. It's a big change. It's great. Mowed the grass yesterday. Feng is really happy too.  b. Has a sister who is bipolar  12. Mood/Depression: minimal  13. Anxiety: minmal  14. Panic attacks: n, not even taking clonazepam  15. Energy: " "good  16. Concentration: good  17. Sleeping: well  18. Eating: stable  19. Refills: y  20. Substances: n  21. Therapy: n  22. Medication compliant: y  23. SE: n  24. No SI HI AVH.        3/31: In person interview:  25. Chart review: No new.  26. Planning: [place] today 27 out of 30. Longer appnt. Seems more hypomanic now. Increase lithium, watch dry lip. SE with every med?  a. Emphasize the importance of medication compliance.  Otherwise, what happened at [facility] is going to happen with us.  27. \"It's much better.\"  a. I've been getting out of the house.   28. Mood/Depression: good  29. Anxiety: less fear, it's better, much better  a. Clonazepam helps, but doesn't need it. Hasn't taken it in a while.   30. Panic attacks: yes, controlled on klonopin.  31. Energy: more  32. Concentration: Good  33. Sleeping: Fairly well  34. Eating: Stable  35. Refills: No  36. Substances: No  37. Therapy: No  38. Medication compliant: y  39. No SI HI AVH.      3/7: In person interview:  40. Chart review: No new.  41. Planning: [place] at next visit, then neuropsych testing. Seems hypomanic, improved but not tolerating abilify. Stop abilify and start lithium qhs.  42. \"My lips and eyes are dry.\"  a. I haven't noticed any change as far as any improvement.  b. I think my lip is dryer.  c. Had nightmares initially when taking it.  43. Mood/Depression: stable  44. Anxiety: stable  45. Panic attacks: n  46. Energy: stable  47. Concentration: stable  48. Sleeping: fairly good, (again, stopped CPAP machine)  49. Eatin lb wg   50. Refills: y  51. Substances: n  52. Therapy: n  53. Medication compliant: y  54. No SI HI AVH.      : In person interview:  55. Chart review: Patient discontinued Abilify stating that it made her anxious.  Urgent appointment.  56. Planning: Increase abilify, a bit more linear. Answered questions more accurately. Appears bipolar hypomanic.  a. Depakote or lithium?  b. Could also add propranolol for akathisia to " "see if that helps her tolerate Abilify better.  c. We want to give each medication a good try.  We do not want to get into the cycle of trying and failing medications too quickly.  d. Neuropsych testing.  57. \"Abilify was just making me more anxious.\"  a. Not talking over me at all. Still a little tangential, but even this has improved. Still talkative.  b. Periods of silence.  c. Stopped abilify over a week ago.  d. My sister was on lithium before  58. Mood/Depression:  a. I am never really depressed  59. Anxiety:  a. I get nervous going out.  b. I was more irritable  60. Panic attacks: n  61. Energy: poor  62. Concentration: poor  63. Sleeping:   a. Well at times  b. Not using CPAP machine  64. Eating: stable  65. Refills: y  66. Substances: n  67. Therapy: n  68. Medication compliant: y  69. No SI HI AVH.      1/17: In person interview:  70. Chart review: No new.  71. Planning: Very difficult patient.  Started Abilify at last visit.  72. \"I couldn't tell much of a difference.\"  a. Records from Delfina Sae, but not from Kamilah Dee, which have been requested.  b. Switched taking the abilify to the mornings.  c. Most concerning symptom is \"anxiety\"  d. Chronic constipation  73. Mood/Depression:  74. Anxiety: feel anxious  75. Panic attacks:  76. Energy: tired all the time, for a long time  a. Unsure if it is related to abilify  b. It's been 5 years  77. Concentration: not at goal  78. Sleeping:   a. On CPAP  79. Eating: 3 lb wg 12/22  80. Refills: y  81. Substances: n  82. Therapy: n  83. Medication compliant: y  a. Take klonopin 1.5 total daily  84. No SI HI AVH.      12/22: In person.  Interview:  85. Chart review:   a. Would rule out ADHD.  b. Would rule out PTSD, consider guanfacine, clonidine, prazosin.  86. His/Her Story: \"I was working with Delfina Quevedo.\"  a. P15, G14  b. Had been working with Kamilah Dee also, and Dr. Rodrigo painting I never even saw Delfina -- maybe 2 years  d. Talks over me, talkative, " "but interruptible, long silences too  e. One day I woke up and \"something wasn't right\" 5 years ago  f. Didn't like the ativan  g. 6 mos I was perfectly fine: 2018 (jan to June)?  h. 2017, multiple losses, when it all began  i. Discussed how klonopin is addictive  j. Everything that I do \"excites me\"  k. Most of the day I watch TV  87. Depression/Mood:  a. Depressed mood, anhedonia, hopelessness, poor energy, poor concentration, weight loss or weight gain, psychomotor retardation or agitation, insomnia controlled on clonazepam.  b. Seasonal pattern:  c. Severity: Moderate  d. Duration: 5 years ago  88. Anxiety:  a. Uncontrolled worrying, muscle tension, fatigue, poor concentration, feeling on edge, irritability, insomnia.  b. Severity: Moderate to severe  c. Duration: 5 years ago  89. Panic attacks: n  90. Psych ROS: Positive for depression, anxiety.  Negative for psychosis and williams.  a. ADHD: Deferred  b. PTSD: Deferred  91. No SI HI AVH.  92. Substances: No  93. Therapy: Briefly  94. Medication compliant: y    Access to Firearms: Yes, locked away    PHQ-9 Depression Screening  PHQ-9 Total Score:      Little interest or pleasure in doing things?     Feeling down, depressed, or hopeless?     Trouble falling or staying asleep, or sleeping too much?     Feeling tired or having little energy?     Poor appetite or overeating?     Feeling bad about yourself - or that you are a failure or have let yourself or your family down?     Trouble concentrating on things, such as reading the newspaper or watching television?     Moving or speaking so slowly that other people could have noticed? Or the opposite - being so fidgety or restless that you have been moving around a lot more than usual?     Thoughts that you would be better off dead, or of hurting yourself in some way?     PHQ-9 Total Score       VITOR-7       Past Surgical History:  Past Surgical History:   Procedure Laterality Date   • COLONOSCOPY  Age 52   • EYE SURGERY "  2004    Lasik to correct vision       Problem List:  Patient Active Problem List   Diagnosis   • Depression with anxiety   • Hip pain       Allergy:   Allergies   Allergen Reactions   • Sulfa Antibiotics Unknown - Low Severity        Discontinued Medications:  Medications Discontinued During This Encounter   Medication Reason   • lithium carbonate 150 MG capsule Reorder   • clonazePAM (KlonoPIN) 1 MG tablet *Therapy completed       Current Medications:   Current Outpatient Medications   Medication Sig Dispense Refill   • acetaminophen (TYLENOL) 500 MG tablet Take 1 tablet by mouth Every 6 (Six) Hours As Needed for Mild Pain.     • buPROPion SR (WELLBUTRIN SR) 100 MG 12 hr tablet TAKE 1 TABLET BY MOUTH TWICE DAILY 180 tablet 1   • doxepin (SINEquan) 25 MG capsule TAKE 1 CAPSULE BY MOUTH AT BEDTIME 90 capsule 1   • lithium carbonate 150 MG capsule Take 2 capsules by mouth Every Night. 180 capsule 1   • polyethylene glycol (MIRALAX) 17 g packet Take 17 g by mouth Daily.       No current facility-administered medications for this visit.       Past Medical History:  Past Medical History:   Diagnosis Date   • Anxiety    • Depression        Past Psychiatric History:  Began Treatment: a few years ago  Diagnoses: dep and anx, bipolar 2  Psychiatrist: [facility]  Therapist: yes briefly  Admission History:Denies  Medication Trials:    Multiple, can't remember inidivdual effects    Only one that helped me get back to my old self was zoloft during those six months    Self Harm: Denies  Suicide Attempts:Denies   Psychosis, Anxiety, Depression: Denies    Substance Abuse History:   Types:Denies all, including illicit  Withdrawal Symptoms:Denies  Longest Period Sober:Not Applicable   AA: Not applicable     Social History:  Martial Status:  Employed:No  Kids:Yes  House:Lives in a house   History: Denies    Social History     Socioeconomic History   • Marital status:    Tobacco Use   • Smoking status: Never  "  • Smokeless tobacco: Never   Vaping Use   • Vaping Use: Never used   Substance and Sexual Activity   • Alcohol use: Never   • Drug use: Never   • Sexual activity: Not Currently     Partners: Male     Birth control/protection: Surgical, Tubal ligation       Family History:   Suicide Attempts: Denies  Suicide Completions:Denies      Family History   Problem Relation Age of Onset   • Bipolar disorder Sister         [id number] ()       Developmental History:       Childhood: Denies Abuse  High School:Completed  College: denies    · Mental Status Exam  · Appearance  · : groomed, good eye contact, normal street clothes  · Behavior  · : pleasant and cooperative  · Motor  · : No abnormal  · Speech  · : normal rhythm, rate, volume, tone, not hyperverbal, not pressured, normal prosidy  · Mood  · : \"good, really good\"  · Affect  · : euthymic, mood congruent, good variability  · Thought Content  · : negative suicidal ideations, negative homicidal ideations, negative obsessions  · Perceptions  · : negative auditory hallucinations, negative visual hallucinations  · Thought Process  · : not as tangential  · Insight/Judgement  · : Fair/fair  · Cognition  · : grossly intact  · Attention   : intact      Review of Systems:  Review of Systems   Constitutional: Negative for diaphoresis and fatigue.   HENT: Negative for drooling.    Eyes: Negative for visual disturbance.   Respiratory: Negative for cough and shortness of breath.    Cardiovascular: Negative for chest pain, palpitations and leg swelling.   Gastrointestinal: Positive for constipation. Negative for abdominal pain, nausea and vomiting.   Endocrine: Negative for cold intolerance and heat intolerance.   Genitourinary: Negative for difficulty urinating.   Musculoskeletal: Negative for joint swelling.   Allergic/Immunologic: Negative for immunocompromised state.   Neurological: Positive for headaches. Negative for dizziness, seizures, speech difficulty, weakness, " "light-headedness and numbness.   Psychiatric/Behavioral: Negative for confusion.       Physical Exam:  Physical Exam    Vital Signs:   /72   Pulse 77   Ht 160 cm (63\")   Wt 62.3 kg (137 lb 6.4 oz)   BMI 24.34 kg/m²      Lab Results:   No visits with results within 6 Month(s) from this visit.   Latest known visit with results is:   Hospital Outpatient Visit on 06/03/2021   Component Date Value Ref Range Status   • WBC 06/03/2021 6.23  4.80 - 10.80 10*3/uL Final   • RBC 06/03/2021 4.01 (L)  4.20 - 5.40 10*6/uL Final   • Hemoglobin 06/03/2021 12.1  12.0 - 16.0 g/dL Final   • Hematocrit 06/03/2021 38.1  37.0 - 47.0 % Final   • MCV 06/03/2021 95.0  81.0 - 99.0 fL Final   • MCH 06/03/2021 30.2  27.0 - 31.0 pg Final   • MCHC 06/03/2021 31.8 (L)  33.0 - 37.0 Final   • RDW 06/03/2021 12.8  11.7 - 14.4 % Final   • Platelets 06/03/2021 218  130 - 400 10*3/uL Final   • MPV 06/03/2021 12.1  9.4 - 12.3 fL Final   • Neutrophil Rel % 06/03/2021 59.0  30.0 - 85.0 % Final   • Lymphocyte Rel % 06/03/2021 28.3  20.0 - 45.0 % Final   • Monocyte Rel % 06/03/2021 8.2  3.0 - 10.0 % Final   • Eosinophil Rel % 06/03/2021 3.2  0.0 - 7.0 % Final   • Basophil Rel % 06/03/2021 1.0  0.0 - 3.0 % Final   • Neutrophils Absolute 06/03/2021 3.68  2.00 - 8.00 10*3/uL Final   • Lymphocytes Absolute 06/03/2021 1.76  1.00 - 5.00 10*3/uL Final   • Monocytes Absolute 06/03/2021 0.51  0.20 - 1.20 10*3/uL Final   • Eosinophils Absolute 06/03/2021 0.20  0.00 - 0.70 10*3/uL Final   • Basophils Absolute 06/03/2021 0.06  0.00 - 0.20 10*3/uL Final   • Immature Granulocyte Rel % 06/03/2021 0.3  0.0 - 1.8 % Final   • Abs Imm Gran 06/03/2021 0.02  0.00 - 0.20 10*3/uL Final   • RDW-SD 06/03/2021 44.8  36.4 - 46.3 fL Final   • NRBC 06/03/2021 0.00  0.00 - 0.70 % Final   • Hemoglobin A1C 06/03/2021 5.5  3.5 - 5.7 % Final    Comment: **Interpretation**  <7% in Controlled Diabetic Patients.  Assay Range 3.4-18.2%  ADA 2010 Standards of Medical Care in Diabetes " suggest using  a cut point of >= 6.5% for diagnosis of diabetes and an A1C  range of 5.7%-6.4% as a category of increased risk for future  diabetes.     • Mean Bld Glu Estim. 06/03/2021 111  mg/dL Final   • Free T4 06/03/2021 1.1  0.9 - 1.8 ng/dL Final   • TSH 06/03/2021 1.630  0.270 - 4.200 m[iU]/L Final   • Triglycerides 06/03/2021 106  40 - 150 mg/dL Final    Comment: <150 mg/dL-Normal  150-199 mg/dL-Borderline High  200-499 mg/dL-High  >500 mg/dL- Very High     • Total Cholesterol 06/03/2021 216 (H)  107 - 200 mg/dL Final    Comment: <200 mg/dL-Desirable  200-239 mg/dL-Borderline High  >240 mg/dL-High  >500 mg/dL-Very High     • HDL Cholesterol 06/03/2021 68 (H)  40 - 60 mg/dL Final    Comment: <40 mg/dL-Low  >60 mg/dL- Desirable     • Chol/HDL Ratio 06/03/2021 3.2  3.0 - 6.0 NA Final   • LDL Cholesterol  06/03/2021 127 (H)  70 - 100 mg/dL Final    Comment: Recommended  LDL Levels  <100 mg/dL-Optimal  100-129 mg/dL-Near Optimal/above optimal  130-159 mg/dL-Borderline High  160-189 mg/dL-High  >190 mg/dL-Very High     • VLDL Cholesterol 06/03/2021 21  5 - 37 mg/dL Final   • Glucose 06/03/2021 101 (H)  65 - 99 mg/dL Final   • BUN 06/03/2021 11  5 - 25 mg/dL Final   • Creatinine 06/03/2021 0.90  0.50 - 0.90 mg/dL Final   • BUN/Creatinine Ratio 06/03/2021 12  6 - 20 [ratio] Final   • GFR 06/03/2021 >60  >60 mL/min/[1.73_m2] Final    Comment: Interpretative Data:  ------------------------------------  STAGE                  GFR  Stage 1                90 mL/min or greater  Stage 2                60-89 mL/min  Stage 3                30-59 mL/min  Stage 4                15-29 mL/min  Value <60 mL/min for 3 or more months is defined as CKD.     • Sodium 06/03/2021 140  135 - 147 mmol/L Final   • Potassium 06/03/2021 4.3  3.5 - 5.3 mmol/L Final   • Chloride 06/03/2021 101  99 - 111 mmol/L Final   • CO2 06/03/2021 26  22 - 32 mmol/L Final   • Anion Gap 06/03/2021 17  8 - 19 mmol/L Final   • OSMOLALITY CALC 06/03/2021 290   273 - 304 Final   • Total Protein 06/03/2021 7.8  6.3 - 8.2 g/dL Final    Comment: If Patient is receiving dextran as a blood volume expander  result may show a potential interference.     • Albumin 06/03/2021 4.7  3.5 - 5.0 g/dL Final   • Globulin 06/03/2021 3.1  2.0 - 3.5 g/dL Final   • A/G Ratio 06/03/2021 1.5  1.4 - 2.6 [ratio] Final   • Calcium 06/03/2021 9.5  8.7 - 10.4 mg/dL Final   • Alkaline Phosphatase 06/03/2021 81  43 - 160 U/L Final   • ALT (SGPT) 06/03/2021 19  10 - 40 U/L Final   • AST (SGOT) 06/03/2021 24  15 - 50 U/L Final   • Total Bilirubin 06/03/2021 0.41  0.20 - 1.30 mg/dL Final   • 25 Hydroxy, Vitamin D 06/03/2021 36.8  30.0 - 100.0 ng/mL Final    Comment: Vitamin D Status          Range  ---------------------------------------  Deficiency                <10 ng/mL  Insufficiency             10-30 ng/mL  Sufficiency                ng/mL  Toxicity                  >100 ng/mL     • Vitamin B-12 06/03/2021 695  211 - 911 pg/mL Final   • Folate 06/03/2021 >20.0  4.8 - 20.0 ng/mL Final    Comment: Results may be falsely decreased due to light exposure if  testing added on after collection.     • T3, Free 06/03/2021 3.0  2.0 - 4.4 pg/mL Final       EKG Results:  No orders to display       Imaging Results:  No Images in the past 120 days found..      Assessment & Plan   Diagnoses and all orders for this visit:    1. Major depressive disorder, recurrent episode, moderate (Primary)  -     lithium carbonate 150 MG capsule; Take 2 capsules by mouth Every Night.  Dispense: 180 capsule; Refill: 1    2. Generalized anxiety disorder  -     lithium carbonate 150 MG capsule; Take 2 capsules by mouth Every Night.  Dispense: 180 capsule; Refill: 1    3. Insomnia due to mental condition  -     lithium carbonate 150 MG capsule; Take 2 capsules by mouth Every Night.  Dispense: 180 capsule; Refill: 1        Visit Diagnoses:    ICD-10-CM ICD-9-CM   1. Major depressive disorder, recurrent episode, moderate  F33.1  "296.32   2. Generalized anxiety disorder  F41.1 300.02   3. Insomnia due to mental condition  F51.05 300.9     327.02       4/27: Stable, well. Not even taking clonazepam.   2 mos        3/31: Much improved, \"best I've been in 5 years.\" No changes now, low threshold to increase lithium to 450 mg. May need neuro workup in the past due to clear bipolar II late-emerging hypomania.  Watch sleeping closely, may need to increase doxepin.  17   Progress: Significant improvement  4 weeks      3/7: [place] today. Longer appnt. Seems more hypomanic now. Increase lithium, watch dry lip. SE with every med? 17   Progress: a little worse  3/31      2/20: [place] at next visit, then neuropsych testing. Seems hypomanic, improved but not tolerating abilify. Stop abilify and start lithium qhs. 17   Prognosis: fair  Progress: seems quite improved  3/7      1/17: Increase abilify, a bit more linear. Answered questions more accurately. Appears bipolar hypomanic. 17   Prognosis: fair to good  Progress: minimal progress  6 wks      12/22: Very talkative and tangential, hard to get anything out of the patient.  Trouble with answering questions, avoids saying yes or no to yes or no questions.  Start Abilify and see back in 4 weeks.  Request records from [facility].  Unclear what the diagnosis is but it could be bipolar.    PLAN:  95. Safety: No acute safety concerns  96. Therapy: None  97. Risk Assessment: Risk of self-harm acutely is moderate.  Risk factors include anxiety disorder, mood disorder, access to weapons and recent psychosocial stressors (pandemic). Protective factors include no family history, no present SI, no history of suicide attempts or self-harm in the past, minimal AODA, healthcare seeking, future orientation, willingness to engage in care.  Risk of self-harm chronically is also moderate, but could be further elevated in the event of treatment noncompliance and/or AODA.  98. Meds:  a.   b. CONTINUE lithium 300 mg nightly. Risks, " benefits, alternatives discussed with patient including ataxia, sedation, dizziness/falls risk, dysarthria, tremor, possible nephrogenic diabetes insipidus, diarrhea, nausea, weight gain, rash, and possible hypothyroid goiter.  Patient also counseled to monitor hydration status and sodium intake as changes can result in rapidly increasing, even toxic, levels of lithium. Patient also counseled regarding the use of diuretics and their ability to increase lithium levels. After discussion of these risks and benefits, the patient voiced understanding and agreed to proceed.  c. CONTINUE Wellbutrin  mg bid. Risks, benefits, alternatives discussed with patient including nausea, GI upset, increased energy, exacerbation of irritability, insomnia, lowering of seizure threshold.  After discussion of these risks and benefits, the patient voiced understanding and agreed to proceed.  d. STOPPED clonazepam 1 mg tid prn. Risks, benefits, and alternatives discussed with patient including sedation/falls risk, dizziness, disinhibition, headache, fatigue, dry mouth, blurred vision, constipation, nausea, diarrhea, heartburn, unusual taste in mouth, urinary retention, increased appetite, restlessness, sexual dysfunction, sweating, weight gain, cardiac arrhythmias, seizures, and fall risk.  After discussion of these risks and benefits, patient voiced understanding and agreed to proceed.  Reymundo reviewed, UDS ordered, and controlled substance agreement signed & witnessed.  e. CONTINUE  Doxepin 25 ng nightly. Risks, benefits, alternatives discussed with patient including dizziness/falls risk, sedation, GI upset, constipation, possible blurred vision.  After discussion of these risks and benefits, the patient voiced understanding and agreed to proceed.  f. S/P:  i. Abilify 2 to 5 mg nightly. Akathisia.   99. Labs: None    Patient screened positive for depression based on a PHQ-9 score of  on . Follow-up recommendations include:  Prescribed antidepressant medication treatment and Suicide Risk Assessment performed.           TREATMENT PLAN/GOALS: Continue supportive psychotherapy efforts and medications as indicated. Treatment and medication options discussed during today's visit. Patient acknowledged and verbally consented to continue with current treatment plan and was educated on the importance of compliance with treatment and follow-up appointments.    MEDICATION ISSUES:  BRYNN reviewed as expected.  Discussed medication options and treatment plan of prescribed medication as well as the risks, benefits, and side effects including potential falls, possible impaired driving and metabolic adversities among others. Patient is agreeable to call the office with any worsening of symptoms or onset of side effects. Patient is agreeable to call 911 or go to the nearest ER should he/she begin having SI/HI. No medication side effects or related complaints today.     MEDS ORDERED DURING VISIT:  New Medications Ordered This Visit   Medications   • lithium carbonate 150 MG capsule     Sig: Take 2 capsules by mouth Every Night.     Dispense:  180 capsule     Refill:  1     Thx 4 all u do.       Return in about 2 months (around 6/27/2023).         This document has been electronically signed by Kai Elliott MD  April 27, 2023 14:32 EDT    Dictated Utilizing Dragon Dictation: Part of this note may be an electronic transcription/translation of spoken language to printed text using the Dragon Dictation System.

## 2023-04-27 NOTE — PATIENT INSTRUCTIONS
1.  Please return to clinic at your next scheduled visit.  Contact the clinic ([phone]) at least 24 hours prior in the event you need to cancel.  2.  Do no harm to yourself or others.    3.  Avoid alcohol and drugs.    4.  Take all medications as prescribed.  Please contact the clinic with any concerns. If you are in need of medication refills, please call the clinic at [phone].    5. Should you want to get in touch with your provider, Dr. Kai Elliott, please utilize [employer] or contact the office ([phone]), and staff will be able to page Dr. Elliott directly.  6.  In the event you have personal crisis, contact the following crisis numbers: Suicide Prevention Hotline 1-316.640.2898; TREASURE Helpline 3-616-418-PMHK; [facility] Emergency Room [phone]; text HELLO to [phone]; or 816.     SPECIFIC RECOMMENDATIONS:     1.      Medications discussed at this encounter:                   - no changes     2.      Psychotherapy recommendations:

## 2023-08-08 DIAGNOSIS — F33.1 MAJOR DEPRESSIVE DISORDER, RECURRENT EPISODE, MODERATE: ICD-10-CM

## 2023-08-08 DIAGNOSIS — F51.05 INSOMNIA DUE TO MENTAL CONDITION: ICD-10-CM

## 2023-08-08 RX ORDER — DOXEPIN HYDROCHLORIDE 25 MG/1
CAPSULE ORAL
Qty: 90 CAPSULE | Refills: 1 | Status: SHIPPED | OUTPATIENT
Start: 2023-08-08

## 2023-08-08 RX ORDER — BUPROPION HYDROCHLORIDE 100 MG/1
TABLET, EXTENDED RELEASE ORAL
Qty: 180 TABLET | Refills: 1 | Status: SHIPPED | OUTPATIENT
Start: 2023-08-08

## 2023-08-08 NOTE — TELEPHONE ENCOUNTER
Medication refill requests.     buPROPion SR (WELLBUTRIN SR) 100 MG 12 hr tablet (02/22/2023)     doxepin (SINEquan) 25 MG capsule (02/22/2023)     Pt has upcoming appt  Appointment with Kai Elliott MD (12/29/2023)     Medication orders pended.   
discussed supply and demand and nipple confusion and encouraged exclusive breastfeeding./verbalizes understanding/demonstrates understanding of teaching/good return demonstration

## 2023-12-05 DIAGNOSIS — F51.05 INSOMNIA DUE TO MENTAL CONDITION: ICD-10-CM

## 2023-12-05 DIAGNOSIS — F41.1 GENERALIZED ANXIETY DISORDER: ICD-10-CM

## 2023-12-05 DIAGNOSIS — F33.1 MAJOR DEPRESSIVE DISORDER, RECURRENT EPISODE, MODERATE: ICD-10-CM

## 2023-12-05 RX ORDER — LITHIUM CARBONATE 150 MG/1
300 CAPSULE ORAL
Qty: 180 CAPSULE | Refills: 1 | Status: SHIPPED | OUTPATIENT
Start: 2023-12-05

## 2023-12-05 NOTE — TELEPHONE ENCOUNTER
REFILL REQUEST FOR LITHIUM CARBONATE 150 MG CAPSULES.  lithium carbonate 150 MG capsule (04/27/2023)     FOLLOW UP APPT ON 12/29/23.  PT LAST SEEN ON 06/29/23.

## 2023-12-29 ENCOUNTER — OFFICE VISIT (OUTPATIENT)
Dept: PSYCHIATRY | Facility: CLINIC | Age: 68
End: 2023-12-29
Payer: MEDICARE

## 2023-12-29 VITALS
WEIGHT: 140.6 LBS | HEIGHT: 63 IN | HEART RATE: 78 BPM | SYSTOLIC BLOOD PRESSURE: 145 MMHG | DIASTOLIC BLOOD PRESSURE: 83 MMHG | BODY MASS INDEX: 24.91 KG/M2

## 2023-12-29 DIAGNOSIS — F51.05 INSOMNIA DUE TO MENTAL CONDITION: ICD-10-CM

## 2023-12-29 DIAGNOSIS — F41.1 GENERALIZED ANXIETY DISORDER: ICD-10-CM

## 2023-12-29 DIAGNOSIS — F31.81 HYPOMANIC BIPOLAR II DISORDER WITH FULL REMISSION: Primary | ICD-10-CM

## 2023-12-29 NOTE — PROGRESS NOTES
"Subjective   Alissa Bruce is a 68 y.o. female who presents today for initial evaluation     Referring Provider:  No referring provider defined for this encounter.    Chief Complaint: Anxiety    History of Present Illness:     : Chart review: Patient has a history of anxiety for several years, followed by [facility] during this time for medication management with little success.  Sent to us for a second opinion.  On Wellbutrin  twice daily, doxepin 25 nightly, Klonopin 1 mg p.o. 3 times daily as needed, but she takes about 1-1/2 tabs daily.  Nothing in PDMP.  No Care Everywhere.  Older labs from 2021 show reassuring CMP, thyroid studies, folate, iron studies, elevated lipids, reassuring B12, vitamin D, CBC.  No head imaging, EKG.    Psychotropic medication review includes Ativan, Vraylar, mirtazapine, Seroquel XR, Celexa, Cymbalta, Latuda, Adderall XR, Rexulti, Prozac, Abilify, Lamictal, BuSpar, Zoloft, Lexapro, Effexor.  There may have been others prior to [facility] management.      \"Alissa\" and \"Feng\"   Most concerning symptom: anxiety  Consider auvelity, MAOIs  Patient Notes:  Patient goals:  Get back to gardening  Misc:      : In person interview:  Chart review:   no new.  Plannin/29: stable, well. No changes. If elevated bps at home over the next 30 days, pt to call me and pcp. 6 mos.   stable, well.  \"I'm doing well, no complaints.\"  Elevated Bps. If stays up over a month, call pcp and me with an update.  Having some hair loss  Mood/Depression: stable, denies hypomanic s/sx  Anxiety: stable  Panic attacks: n  Energy: stable  Concentration: stable  Sleeping: stable  Eatin lbs  Refills: y  Substances: def  Therapy: n  Medication compliant: y  SE: n  No SI HI AVH.      : In person interview:  Chart review: no new.  Planning: stable, well.  \"I'm doing well, no complaints.\"  Elevated Bps. If stays up over a month, call pcp and me with an update.  Mood/Depression: doing well  Anxiety: " "doing well  Panic attacks: n  Energy: good  Concentration: good  Sleeping: well  Eating: stable, 5 lb wl 3/23  Refills: y  Substances: def  Therapy: n  Medication compliant: y  SE: n  No SI HI AVH.      4/27: In person interview:  Chart review: No new.  Planning: Much improved, \"best I've been in 5 years.\" No changes now, low threshold to increase lithium to 450 mg. May need neuro workup in the past due to clear bipolar II late-emerging hypomania.  Watch sleeping closely, may need to increase doxepin.   \"Good, real good.\"  I'm in a good spot. It's a big change. It's great. Mowed the grass yesterday. Feng is really happy too.  Has a sister who is bipolar  Mood/Depression: minimal  Anxiety: minmal  Panic attacks: n, not even taking clonazepam  Energy: good  Concentration: good  Sleeping: well  Eating: stable  Refills: y  Substances: n  Therapy: n  Medication compliant: y  SE: n  No SI HI AVH.      3/31: In person interview:  Chart review: No new.  Planning: [place] today 27 out of 30. Longer appnt. Seems more hypomanic now. Increase lithium, watch dry lip. SE with every med?  Emphasize the importance of medication compliance.  Otherwise, what happened at [facility] is going to happen with us.  \"It's much better.\"  I've been getting out of the house.   Mood/Depression: good  Anxiety: less fear, it's better, much better  Clonazepam helps, but doesn't need it. Hasn't taken it in a while.   Panic attacks: yes, controlled on klonopin.  Energy: more  Concentration: Good  Sleeping: Fairly well  Eating: Stable  Refills: No  Substances: No  Therapy: No  Medication compliant: y  No SI HI AVH.      3/7: In person interview:  Chart review: No new.  Planning: [place] at next visit, then neuropsych testing. Seems hypomanic, improved but not tolerating abilify. Stop abilify and start lithium qhs.  \"My lips and eyes are dry.\"  I haven't noticed any change as far as any improvement.  I think my lip is dryer.  Had nightmares initially when taking " "it.  Mood/Depression: stable  Anxiety: stable  Panic attacks: n  Energy: stable  Concentration: stable  Sleeping: fairly good, (again, stopped CPAP machine)  Eatin lb wg   Refills: y  Substances: n  Therapy: n  Medication compliant: y  No SI HI AVH.      : In person interview:  Chart review: Patient discontinued Abilify stating that it made her anxious.  Urgent appointment.  Planning: Increase abilify, a bit more linear. Answered questions more accurately. Appears bipolar hypomanic.  Depakote or lithium?  Could also add propranolol for akathisia to see if that helps her tolerate Abilify better.  We want to give each medication a good try.  We do not want to get into the cycle of trying and failing medications too quickly.  Neuropsych testing.  \"Abilify was just making me more anxious.\"  Not talking over me at all. Still a little tangential, but even this has improved. Still talkative.  Periods of silence.  Stopped abilify over a week ago.  My sister was on lithium before  Mood/Depression:  I am never really depressed  Anxiety:  I get nervous going out.  I was more irritable  Panic attacks: n  Energy: poor  Concentration: poor  Sleeping:   Well at times  Not using CPAP machine  Eating: stable  Refills: y  Substances: n  Therapy: n  Medication compliant: y  No SI HI AVH.      : In person interview:  Chart review: No new.  Planning: Very difficult patient.  Started Abilify at last visit.  \"I couldn't tell much of a difference.\"  Records from Delfina Quevedo, but not from Kamilah Dee, which have been requested.  Switched taking the abilify to the mornings.  Most concerning symptom is \"anxiety\"  Chronic constipation  Mood/Depression:  Anxiety: feel anxious  Panic attacks:  Energy: tired all the time, for a long time  Unsure if it is related to abilify  It's been 5 years  Concentration: not at goal  Sleeping:   On CPAP  Eating: 3 lb wg   Refills: y  Substances: n  Therapy: n  Medication compliant: " "y  Take klonopin 1.5 total daily  No SI HI AVH.      12/22: In person.  Interview:  Chart review:   Would rule out ADHD.  Would rule out PTSD, consider guanfacine, clonidine, prazosin.  His/Her Story: \"I was working with Delfinagerardo Quevedo.\"  P15, G14  Had been working with Kamilah Dee also, and Dr. Wallace  I never even saw Delfina -- maybe 2 years  Talks over me, talkative, but interruptible, long silences too  One day I woke up and \"something wasn't right\" 5 years ago  Didn't like the ativan  6 mos I was perfectly fine: 2018 (jan to June)?  2017, multiple losses, when it all began  Discussed how klonopin is addictive  Everything that I do \"excites me\"  Most of the day I watch TV  Depression/Mood:  Depressed mood, anhedonia, hopelessness, poor energy, poor concentration, weight loss or weight gain, psychomotor retardation or agitation, insomnia controlled on clonazepam.  Seasonal pattern:  Severity: Moderate  Duration: 5 years ago  Anxiety:  Uncontrolled worrying, muscle tension, fatigue, poor concentration, feeling on edge, irritability, insomnia.  Severity: Moderate to severe  Duration: 5 years ago  Panic attacks: n  Psych ROS: Positive for depression, anxiety.  Negative for psychosis and williams.  ADHD: Deferred  PTSD: Deferred  No SI HI AVH.  Substances: No  Therapy: Briefly  Medication compliant: y    Access to Firearms: Yes, locked away    PHQ-9 Depression Screening  PHQ-9 Total Score:      Little interest or pleasure in doing things?     Feeling down, depressed, or hopeless?     Trouble falling or staying asleep, or sleeping too much?     Feeling tired or having little energy?     Poor appetite or overeating?     Feeling bad about yourself - or that you are a failure or have let yourself or your family down?     Trouble concentrating on things, such as reading the newspaper or watching television?     Moving or speaking so slowly that other people could have noticed? Or the opposite - being so fidgety or restless " that you have been moving around a lot more than usual?     Thoughts that you would be better off dead, or of hurting yourself in some way?     PHQ-9 Total Score       VITOR-7       Past Surgical History:  Past Surgical History:   Procedure Laterality Date    COLONOSCOPY  Age 52    EYE SURGERY  2004    Lasik to correct vision       Problem List:  Patient Active Problem List   Diagnosis    Depression with anxiety    Hip pain       Allergy:   Allergies   Allergen Reactions    Sulfa Antibiotics Unknown - Low Severity        Discontinued Medications:  There are no discontinued medications.      Current Medications:   Current Outpatient Medications   Medication Sig Dispense Refill    acetaminophen (TYLENOL) 500 MG tablet Take 1 tablet by mouth Every 6 (Six) Hours As Needed for Mild Pain.      buPROPion SR (WELLBUTRIN SR) 100 MG 12 hr tablet TAKE 1 TABLET BY MOUTH TWICE DAILY 180 tablet 1    doxepin (SINEquan) 25 MG capsule TAKE 1 CAPSULE BY MOUTH AT BEDTIME 90 capsule 1    lithium carbonate 150 MG capsule TAKE 2 CAPSULES BY MOUTH AT BEDTIME 180 capsule 1    polyethylene glycol (MIRALAX) 17 g packet Take 17 g by mouth Daily.       No current facility-administered medications for this visit.       Past Medical History:  Past Medical History:   Diagnosis Date    Anxiety     Depression        Past Psychiatric History:  Began Treatment: a few years ago  Diagnoses: dep and anx, bipolar 2  Psychiatrist: [facility]  Therapist: yes briefly  Admission History:Denies  Medication Trials:    Multiple, can't remember inidivdual effects    Only one that helped me get back to my old self was zoloft during those six months    Self Harm: Denies  Suicide Attempts:Denies   Psychosis, Anxiety, Depression: Denies    Substance Abuse History:   Types:Denies all, including illicit  Withdrawal Symptoms:Denies  Longest Period Sober:Not Applicable   AA: Not applicable     Social History:  Martial Status:  Employed:No  Kids:Yes  House:Lives  "in a house   History: Denies    Social History     Socioeconomic History    Marital status:    Tobacco Use    Smoking status: Never     Passive exposure: Past    Smokeless tobacco: Never   Vaping Use    Vaping Use: Never used   Substance and Sexual Activity    Alcohol use: Never    Drug use: Never    Sexual activity: Not Currently     Partners: Male     Birth control/protection: Surgical, Tubal ligation       Family History:   Suicide Attempts: Denies  Suicide Completions:Denies      Family History   Problem Relation Age of Onset    No Known Problems Mother     No Known Problems Father     Bipolar disorder Sister         [id number] ()    No Known Problems Brother     No Known Problems Maternal Aunt     No Known Problems Paternal Aunt     No Known Problems Maternal Uncle     No Known Problems Paternal Uncle     No Known Problems Maternal Grandfather     No Known Problems Maternal Grandmother     No Known Problems Paternal Grandfather     No Known Problems Paternal Grandmother     No Known Problems Cousin     ADD / ADHD Neg Hx     Alcohol abuse Neg Hx     Anxiety disorder Neg Hx     Dementia Neg Hx     Depression Neg Hx     Drug abuse Neg Hx     OCD Neg Hx     Paranoid behavior Neg Hx     Schizophrenia Neg Hx     Seizures Neg Hx     Self-Injurious Behavior  Neg Hx     Suicide Attempts Neg Hx        Developmental History:       Childhood: Denies Abuse  High School:Completed  College: denies    Mental Status Exam  Appearance  : groomed, good eye contact, normal street clothes  Behavior  : pleasant and cooperative  Motor  : No abnormal  Speech  : normal rhythm, rate, volume, tone, not hyperverbal, not pressured, normal prosidy  Mood  : \"really good\"  Affect  : euthymic, mood congruent, good variability  Thought Content  : negative suicidal ideations, negative homicidal ideations, negative obsessions  Perceptions  : negative auditory hallucinations, negative visual hallucinations  Thought Process  : " "not as tangential  Insight/Judgement  : Fair/fair  Cognition  : grossly intact  Attention   : intact      Review of Systems:  Review of Systems   Constitutional:  Negative for diaphoresis and fatigue.   HENT:  Negative for drooling.    Eyes:  Negative for visual disturbance.   Respiratory:  Negative for cough and shortness of breath.    Cardiovascular:  Negative for chest pain, palpitations and leg swelling.   Gastrointestinal:  Negative for diarrhea, nausea and vomiting.   Endocrine: Negative for cold intolerance and heat intolerance.   Genitourinary:  Negative for difficulty urinating.   Musculoskeletal:  Negative for joint swelling.   Allergic/Immunologic: Negative for immunocompromised state.   Neurological:  Negative for dizziness, seizures, speech difficulty, light-headedness and numbness.       Physical Exam:  Physical Exam    Vital Signs:   /83 Comment: LEFT  Pulse 78   Ht 160 cm (63\")   Wt 63.8 kg (140 lb 9.6 oz)   BMI 24.91 kg/m²      Lab Results:   No visits with results within 6 Month(s) from this visit.   Latest known visit with results is:   Hospital Outpatient Visit on 06/03/2021   Component Date Value Ref Range Status    WBC 06/03/2021 6.23  4.80 - 10.80 10*3/uL Final    RBC 06/03/2021 4.01 (L)  4.20 - 5.40 10*6/uL Final    Hemoglobin 06/03/2021 12.1  12.0 - 16.0 g/dL Final    Hematocrit 06/03/2021 38.1  37.0 - 47.0 % Final    MCV 06/03/2021 95.0  81.0 - 99.0 fL Final    MCH 06/03/2021 30.2  27.0 - 31.0 pg Final    MCHC 06/03/2021 31.8 (L)  33.0 - 37.0 Final    RDW 06/03/2021 12.8  11.7 - 14.4 % Final    Platelets 06/03/2021 218  130 - 400 10*3/uL Final    MPV 06/03/2021 12.1  9.4 - 12.3 fL Final    Neutrophil Rel % 06/03/2021 59.0  30.0 - 85.0 % Final    Lymphocyte Rel % 06/03/2021 28.3  20.0 - 45.0 % Final    Monocyte Rel % 06/03/2021 8.2  3.0 - 10.0 % Final    Eosinophil Rel % 06/03/2021 3.2  0.0 - 7.0 % Final    Basophil Rel % 06/03/2021 1.0  0.0 - 3.0 % Final    Neutrophils Absolute " 06/03/2021 3.68  2.00 - 8.00 10*3/uL Final    Lymphocytes Absolute 06/03/2021 1.76  1.00 - 5.00 10*3/uL Final    Monocytes Absolute 06/03/2021 0.51  0.20 - 1.20 10*3/uL Final    Eosinophils Absolute 06/03/2021 0.20  0.00 - 0.70 10*3/uL Final    Basophils Absolute 06/03/2021 0.06  0.00 - 0.20 10*3/uL Final    Immature Granulocyte Rel % 06/03/2021 0.3  0.0 - 1.8 % Final    Abs Imm Gran 06/03/2021 0.02  0.00 - 0.20 10*3/uL Final    RDW-SD 06/03/2021 44.8  36.4 - 46.3 fL Final    NRBC 06/03/2021 0.00  0.00 - 0.70 % Final    Hemoglobin A1C 06/03/2021 5.5  3.5 - 5.7 % Final    Comment: **Interpretation**  <7% in Controlled Diabetic Patients.  Assay Range 3.4-18.2%  ADA 2010 Standards of Medical Care in Diabetes suggest using  a cut point of >= 6.5% for diagnosis of diabetes and an A1C  range of 5.7%-6.4% as a category of increased risk for future  diabetes.      Mean Bld Glu Estim. 06/03/2021 111  mg/dL Final    Free T4 06/03/2021 1.1  0.9 - 1.8 ng/dL Final    TSH 06/03/2021 1.630  0.270 - 4.200 m[iU]/L Final    Triglycerides 06/03/2021 106  40 - 150 mg/dL Final    Comment: <150 mg/dL-Normal  150-199 mg/dL-Borderline High  200-499 mg/dL-High  >500 mg/dL- Very High      Total Cholesterol 06/03/2021 216 (H)  107 - 200 mg/dL Final    Comment: <200 mg/dL-Desirable  200-239 mg/dL-Borderline High  >240 mg/dL-High  >500 mg/dL-Very High      HDL Cholesterol 06/03/2021 68 (H)  40 - 60 mg/dL Final    Comment: <40 mg/dL-Low  >60 mg/dL- Desirable      Chol/HDL Ratio 06/03/2021 3.2  3.0 - 6.0 NA Final    LDL Cholesterol  06/03/2021 127 (H)  70 - 100 mg/dL Final    Comment: Recommended  LDL Levels  <100 mg/dL-Optimal  100-129 mg/dL-Near Optimal/above optimal  130-159 mg/dL-Borderline High  160-189 mg/dL-High  >190 mg/dL-Very High      VLDL Cholesterol 06/03/2021 21  5 - 37 mg/dL Final    Glucose 06/03/2021 101 (H)  65 - 99 mg/dL Final    BUN 06/03/2021 11  5 - 25 mg/dL Final    Creatinine 06/03/2021 0.90  0.50 - 0.90 mg/dL Final     BUN/Creatinine Ratio 06/03/2021 12  6 - 20 [ratio] Final    GFR 06/03/2021 >60  >60 mL/min/[1.73_m2] Final    Comment: Interpretative Data:  ------------------------------------  STAGE                  GFR  Stage 1                90 mL/min or greater  Stage 2                60-89 mL/min  Stage 3                30-59 mL/min  Stage 4                15-29 mL/min  Value <60 mL/min for 3 or more months is defined as CKD.      Sodium 06/03/2021 140  135 - 147 mmol/L Final    Potassium 06/03/2021 4.3  3.5 - 5.3 mmol/L Final    Chloride 06/03/2021 101  99 - 111 mmol/L Final    CO2 06/03/2021 26  22 - 32 mmol/L Final    Anion Gap 06/03/2021 17  8 - 19 mmol/L Final    OSMOLALITY CALC 06/03/2021 290  273 - 304 Final    Total Protein 06/03/2021 7.8  6.3 - 8.2 g/dL Final    Comment: If Patient is receiving dextran as a blood volume expander  result may show a potential interference.      Albumin 06/03/2021 4.7  3.5 - 5.0 g/dL Final    Globulin 06/03/2021 3.1  2.0 - 3.5 g/dL Final    A/G Ratio 06/03/2021 1.5  1.4 - 2.6 [ratio] Final    Calcium 06/03/2021 9.5  8.7 - 10.4 mg/dL Final    Alkaline Phosphatase 06/03/2021 81  43 - 160 U/L Final    ALT (SGPT) 06/03/2021 19  10 - 40 U/L Final    AST (SGOT) 06/03/2021 24  15 - 50 U/L Final    Total Bilirubin 06/03/2021 0.41  0.20 - 1.30 mg/dL Final    25 Hydroxy, Vitamin D 06/03/2021 36.8  30.0 - 100.0 ng/mL Final    Comment: Vitamin D Status          Range  ---------------------------------------  Deficiency                <10 ng/mL  Insufficiency             10-30 ng/mL  Sufficiency                ng/mL  Toxicity                  >100 ng/mL      Vitamin B-12 06/03/2021 695  211 - 911 pg/mL Final    Folate 06/03/2021 >20.0  4.8 - 20.0 ng/mL Final    Comment: Results may be falsely decreased due to light exposure if  testing added on after collection.      T3, Free 06/03/2021 3.0  2.0 - 4.4 pg/mL Final       EKG Results:  No orders to display       Imaging Results:  No Images in the  "past 120 days found..      Assessment & Plan   Diagnoses and all orders for this visit:    1. Hypomanic bipolar II disorder with full remission (Primary)    2. Generalized anxiety disorder    3. Insomnia due to mental condition        Visit Diagnoses:    ICD-10-CM ICD-9-CM   1. Hypomanic bipolar II disorder with full remission  F31.81 296.89   2. Generalized anxiety disorder  F41.1 300.02   3. Insomnia due to mental condition  F51.05 300.9     327.02     12/29: Stable, well, watch hair loss (can occur in up  to 10% taking lithium), may need to start zinc/selenium supplements. 6m    6/29: stable, well. No changes. If elevated bps at home over the next 30 days, pt to call me and pcp. 6 mos    4/27: Stable, well. Not even taking clonazepam.     3/31: Much improved, \"best I've been in 5 years.\" No changes now, low threshold to increase lithium to 450 mg. May need neuro workup in the past due to clear bipolar II late-emerging hypomania.  Watch sleeping closely, may need to increase doxepin.     3/7: Harshaw today. Longer appnt. Seems more hypomanic now. Increase lithium, watch dry lip. SE with every med?     2/20: Harshaw at next visit, then neuropsych testing. Seems hypomanic, improved but not tolerating abilify. Stop abilify and start lithium qhs.     1/17: Increase abilify, a bit more linear. Answered questions more accurately. Appears bipolar hypomanic.     12/22: Very talkative and tangential, hard to get anything out of the patient.  Trouble with answering questions, avoids saying yes or no to yes or no questions.  Start Abilify and see back in 4 weeks.  Request records from [facility].  Unclear what the diagnosis is but it could be bipolar.    PLAN:  Safety: No acute safety concerns  Therapy: None  Risk Assessment: Risk of self-harm acutely is moderate.  Risk factors include anxiety disorder, mood disorder, access to weapons and recent psychosocial stressors (pandemic). Protective factors include no family history, no " present SI, no history of suicide attempts or self-harm in the past, minimal AODA, healthcare seeking, future orientation, willingness to engage in care.  Risk of self-harm chronically is also moderate, but could be further elevated in the event of treatment noncompliance and/or AODA.  Meds:    CONTINUE lithium 300 mg nightly. Risks, benefits, alternatives discussed with patient including ataxia, sedation, dizziness/falls risk, dysarthria, tremor, possible nephrogenic diabetes insipidus, diarrhea, nausea, weight gain, rash, and possible hypothyroid goiter.  Patient also counseled to monitor hydration status and sodium intake as changes can result in rapidly increasing, even toxic, levels of lithium. Patient also counseled regarding the use of diuretics and their ability to increase lithium levels. After discussion of these risks and benefits, the patient voiced understanding and agreed to proceed.  CONTINUE Wellbutrin  mg bid. Risks, benefits, alternatives discussed with patient including nausea, GI upset, increased energy, exacerbation of irritability, insomnia, lowering of seizure threshold.  After discussion of these risks and benefits, the patient voiced understanding and agreed to proceed.  STOPPED clonazepam 1 mg tid prn. Risks, benefits, and alternatives discussed with patient including sedation/falls risk, dizziness, disinhibition, headache, fatigue, dry mouth, blurred vision, constipation, nausea, diarrhea, heartburn, unusual taste in mouth, urinary retention, increased appetite, restlessness, sexual dysfunction, sweating, weight gain, cardiac arrhythmias, seizures, and fall risk.  After discussion of these risks and benefits, patient voiced understanding and agreed to proceed.  Reymundo reviewed, UDS ordered, and controlled substance agreement signed & witnessed.  CONTINUE  Doxepin 25 ng nightly. Risks, benefits, alternatives discussed with patient including dizziness/falls risk, sedation, GI upset,  constipation, possible blurred vision.  After discussion of these risks and benefits, the patient voiced understanding and agreed to proceed.  S/P:  Abilify 2 to 5 mg nightly. Akathisia.   Labs: None    Patient screened positive for depression based on a PHQ-9 score of  on . Follow-up recommendations include: Prescribed antidepressant medication treatment and Suicide Risk Assessment performed.           TREATMENT PLAN/GOALS: Continue supportive psychotherapy efforts and medications as indicated. Treatment and medication options discussed during today's visit. Patient acknowledged and verbally consented to continue with current treatment plan and was educated on the importance of compliance with treatment and follow-up appointments.    MEDICATION ISSUES:  BRYNN reviewed as expected.  Discussed medication options and treatment plan of prescribed medication as well as the risks, benefits, and side effects including potential falls, possible impaired driving and metabolic adversities among others. Patient is agreeable to call the office with any worsening of symptoms or onset of side effects. Patient is agreeable to call 911 or go to the nearest ER should he/she begin having SI/HI. No medication side effects or related complaints today.     MEDS ORDERED DURING VISIT:  No orders of the defined types were placed in this encounter.      Return in about 6 months (around 6/29/2024).         This document has been electronically signed by Kai Elliott MD  December 29, 2023 14:36 EST    Dictated Utilizing Dragon Dictation: Part of this note may be an electronic transcription/translation of spoken language to printed text using the Dragon Dictation System.

## 2023-12-29 NOTE — PATIENT INSTRUCTIONS
1.  Please return to clinic at your next scheduled visit.  Contact the clinic ([phone]) at least 24 hours prior in the event you need to cancel.  2.  Do no harm to yourself or others.    3.  Avoid alcohol and drugs.    4.  Take all medications as prescribed.  Please contact the clinic with any concerns. If you are in need of medication refills, please call the clinic at [phone].    5. Should you want to get in touch with your provider, Dr. Kai Elliott, please utilize Netlog[employer] or contact the office ([phone]), and staff will be able to page Dr. Elliott directly.  6.  In the event you have personal crisis, contact the following crisis numbers: Suicide Prevention Hotline 1-872.463.8115; TREASURE Helpline 3-079-800-TREASURE; [facility] Emergency Room [phone]; text HELLO to 624818; or 891.

## 2024-01-04 ENCOUNTER — OFFICE VISIT (OUTPATIENT)
Dept: FAMILY MEDICINE CLINIC | Facility: CLINIC | Age: 69
End: 2024-01-04
Payer: MEDICARE

## 2024-01-04 VITALS
HEIGHT: 63 IN | OXYGEN SATURATION: 100 % | TEMPERATURE: 97.8 F | BODY MASS INDEX: 24.91 KG/M2 | HEART RATE: 86 BPM | SYSTOLIC BLOOD PRESSURE: 140 MMHG | WEIGHT: 140.6 LBS | DIASTOLIC BLOOD PRESSURE: 67 MMHG

## 2024-01-04 DIAGNOSIS — Z13.220 SCREENING FOR LIPID DISORDERS: ICD-10-CM

## 2024-01-04 DIAGNOSIS — M25.551 RIGHT HIP PAIN: ICD-10-CM

## 2024-01-04 DIAGNOSIS — Z51.81 MEDICATION MONITORING ENCOUNTER: ICD-10-CM

## 2024-01-04 DIAGNOSIS — Z00.00 ENCOUNTER FOR ANNUAL WELLNESS EXAM IN MEDICARE PATIENT: Primary | ICD-10-CM

## 2024-01-04 DIAGNOSIS — Z13.29 SCREENING FOR THYROID DISORDER: ICD-10-CM

## 2024-01-04 DIAGNOSIS — F41.8 DEPRESSION WITH ANXIETY: ICD-10-CM

## 2024-01-04 RX ORDER — DICLOFENAC SODIUM 75 MG/1
75 TABLET, DELAYED RELEASE ORAL 2 TIMES DAILY
Qty: 60 TABLET | Refills: 2 | Status: SHIPPED | OUTPATIENT
Start: 2024-01-04

## 2024-01-04 NOTE — PROGRESS NOTES
The ABCs of the Annual Wellness Visit  [facility]    Subjective     Alissa Bruce is a 68 y.o. female who presents for a  Welcome to Medicare Visit.    The following portions of the patient's history were reviewed and   updated as appropriate: allergies, current medications, past family history, past medical history, past social history, past surgical history, and problem list.     Compared to one year ago, the patient feels her physical   health is the same.    Compared to one year ago, the patient feels her mental   health is the same.    Recent Hospitalizations:  She was not admitted to the hospital during the last year.       Current Medical Providers:  Patient Care Team:  Rubén Garcia DO as PCP - General (Family Medicine)    Outpatient Medications Prior to Visit   Medication Sig Dispense Refill    buPROPion SR (WELLBUTRIN SR) 100 MG 12 hr tablet TAKE 1 TABLET BY MOUTH TWICE DAILY 180 tablet 1    doxepin (SINEquan) 25 MG capsule TAKE 1 CAPSULE BY MOUTH AT BEDTIME 90 capsule 1    lithium carbonate 150 MG capsule TAKE 2 CAPSULES BY MOUTH AT BEDTIME 180 capsule 1    polyethylene glycol (MIRALAX) 17 g packet Take 17 g by mouth Daily.      acetaminophen (TYLENOL) 500 MG tablet Take 1 tablet by mouth Every 6 (Six) Hours As Needed for Mild Pain.       No facility-administered medications prior to visit.       No opioid medication identified on active medication list. I have reviewed chart for other potential  high risk medication/s and harmful drug interactions in the elderly.        Aspirin is not on active medication list.  Aspirin use is not indicated based on review of current medical condition/s. Risk of harm outweighs potential benefits.  .    Patient Active Problem List   Diagnosis    Depression with anxiety    Right hip pain    Encounter for annual wellness exam in Medicare patient     Advance Care Planning   Advance Care Planning     Advance Directive is not on file.  ACP discussion was  "held with the patient during this visit. Patient does not have an advance directive, information provided.       Objective   Vitals:    24 1528 24 1532   BP: 148/66 140/67   BP Location: Right arm    Patient Position: Sitting    Cuff Size: Adult    Pulse: 86    Temp: 97.8 °F (36.6 °C)    TempSrc: Temporal    SpO2: 100%    Weight: 63.8 kg (140 lb 9.6 oz)    Height: 160 cm (63\")      Estimated body mass index is 24.91 kg/m² as calculated from the following:    Height as of this encounter: 160 cm (63\").    Weight as of this encounter: 63.8 kg (140 lb 9.6 oz).    BMI is within normal parameters. No other follow-up for BMI required.      Does the patient have evidence of cognitive impairment?   No         Procedures       HEALTH RISK ASSESSMENT    Smoking Status:  Social History     Tobacco Use   Smoking Status Never    Passive exposure: Past   Smokeless Tobacco Never     Alcohol Consumption:  Social History     Substance and Sexual Activity   Alcohol Use Never       Fall Risk Screen:    STEADI Fall Risk Assessment was completed, and patient is at LOW risk for falls.Assessment completed on:2024    Depression Screen:       2024     3:26 PM   PHQ-2/PHQ-9 Depression Screening   Little Interest or Pleasure in Doing Things 0-->not at all   Feeling Down, Depressed or Hopeless 0-->not at all   PHQ-9: Brief Depression Severity Measure Score 0       Health Habits and Functional and Cognitive Screenin/4/2024     3:00 PM   Functional & Cognitive Status   Do you have difficulty preparing food and eating? No   Do you have difficulty bathing yourself, getting dressed or grooming yourself? No   Do you have difficulty using the toilet? No   Do you have difficulty moving around from place to place? No   Do you have trouble with steps or getting out of a bed or a chair? No   Current Diet Well Balanced Diet   Dental Exam Up to date   Eye Exam Up to date   Exercise (times per week) 4 times per week   Current " Exercises Include Walking   Do you need help using the phone?  No   Are you deaf or do you have serious difficulty hearing?  No   Do you need help to go to places out of walking distance? No   Do you need help shopping? No   Do you need help preparing meals?  No   Do you need help with housework?  No   Do you need help with laundry? No   Do you need help taking your medications? No   Do you need help managing money? No   Do you ever drive or ride in a car without wearing a seat belt? No   Have you felt unusual stress, anger or loneliness in the last month? No   Who do you live with? Spouse   If you need help, do you have trouble finding someone available to you? No   Have you been bothered in the last four weeks by sexual problems? No   Do you have difficulty concentrating, remembering or making decisions? No       Visual Acuity:    Vision Screening    Right eye Left eye Both eyes   Without correction      With correction 20/30 20/25 20/25       Age-appropriate Screening Schedule:  Refer to the list below for future screening recommendations based on patient's age, sex and/or medical conditions. Orders for these recommended tests are listed in the plan section. The patient has been provided with a written plan.    Health Maintenance   Topic Date Due    DXA SCAN  Never done    COLORECTAL CANCER SCREENING  Never done    TDAP/TD VACCINES (2 - Tdap) 10/28/2007    MAMMOGRAM  05/17/2020    HEPATITIS C SCREENING  Never done    COVID-19 Vaccine (4 - 2023-24 season) 01/18/2024 (Originally 9/1/2023)    INFLUENZA VACCINE  01/18/2024 (Originally 8/1/2023)    Pneumococcal Vaccine 65+ (1 of 1 - PCV) 01/18/2024 (Originally 11/5/2021)    ZOSTER VACCINE (1 of 2) 01/18/2024 (Originally 11/5/2006)    ANNUAL WELLNESS VISIT  01/04/2025        CMS Preventative Services Quick Reference  Risk Factors Identified During Encounter    None Identified  The above risks/problems have been discussed with the patient.  Pertinent information has  "been shared with the patient in the After Visit Summary.    Follow Up:   Initial Medicare Visit in one year    An After Visit Summary and PPPS were made available to the patient.      Additional E&M Note during same encounter follows:  Patient has multiple medical problems which are significant and separately identifiable that require additional work above and beyond the Medicare Wellness Visit.      Chief Complaint  swelling in right hip  (Pt states she has had swelling in right hip/buttocks over the years and it hurts to go down the stairs.) and Welcome To Medicare    Subjective        Pt presents for annual wellness visit, labs. Pt c/o R hip pain x 1 year. States it hurts to go down stairs. Going up stairs does not cause pain. Denies any falls or injuries. Has taken ibu to treat.     Reviewed all recent labs and medications.      Alissa Bruce is also being seen today for hip pain/labs.     Review of Systems   Constitutional:  Negative for chills and fever.   HENT:  Negative for congestion and sore throat.    Eyes:  Negative for visual disturbance.   Respiratory:  Negative for cough, chest tightness and shortness of breath.    Cardiovascular:  Negative for chest pain.   Gastrointestinal:  Negative for constipation, diarrhea, nausea and vomiting.   Genitourinary:  Negative for dysuria.   Musculoskeletal:  Positive for arthralgias.   Skin:  Negative for rash.   Psychiatric/Behavioral:  Negative for dysphoric mood and suicidal ideas. The patient is not nervous/anxious.        Objective   Vital Signs:  /67   Pulse 86   Temp 97.8 °F (36.6 °C) (Temporal)   Ht 160 cm (63\")   Wt 63.8 kg (140 lb 9.6 oz)   SpO2 100%   BMI 24.91 kg/m²     Physical Exam  Vitals reviewed.   Constitutional:       General: She is not in acute distress.     Appearance: Normal appearance.   HENT:      Head: Normocephalic.      Right Ear: Tympanic membrane normal.      Left Ear: Tympanic membrane normal.      Nose: Nose normal.     "  Mouth/Throat:      Pharynx: Oropharynx is clear. No posterior oropharyngeal erythema.   Eyes:      General: No scleral icterus.     Extraocular Movements: Extraocular movements intact.      Conjunctiva/sclera: Conjunctivae normal.      Pupils: Pupils are equal, round, and reactive to light.   Cardiovascular:      Rate and Rhythm: Normal rate and regular rhythm.      Pulses: Normal pulses.      Heart sounds: Normal heart sounds.   Pulmonary:      Effort: Pulmonary effort is normal.      Breath sounds: Normal breath sounds.   Abdominal:      General: Bowel sounds are normal.      Palpations: Abdomen is soft.   Musculoskeletal:         General: Normal range of motion.      Cervical back: Neck supple.      Right hip: Tenderness and bony tenderness present.   Skin:     General: Skin is warm and dry.   Neurological:      Mental Status: She is alert and oriented to person, place, and time.   Psychiatric:         Mood and Affect: Mood normal.         Behavior: Behavior normal.         Thought Content: Thought content normal.         Judgment: Judgment normal.                      Assessment and Plan   Diagnoses and all orders for this visit:    1. Encounter for annual wellness exam in Medicare patient (Primary)  Assessment & Plan:  Discussed age appropriate preventative counseling including all recommended screenings and immunizations, sunscreen, and seatbelt use. Written information provided to patient. All questions answered. Pt verbalized understanding.         2. Depression with anxiety  Assessment & Plan:  Patient's depression is single episode and is mild without psychosis. Their depression is currently active and the condition is improving with treatment. This will be reassessed at the next regular appointment. F/U as described:patient will continue current medication therapy. Keep all FU with psych.     Orders:  -     Comprehensive Metabolic Panel; Future  -     CBC & Differential; Future    3. Right hip  pain  Assessment & Plan:  Order for xr   Consider pt/joint injection   Ice/heat 20 min tid prn   Increase rest   Diclofenac 75mg PO bid     Orders:  -     Comprehensive Metabolic Panel; Future  -     CBC & Differential; Future  -     XR Hip With or Without Pelvis 2 - 3 View Right; Future    4. Screening for lipid disorders  -     Lipid Panel; Future    5. Screening for thyroid disorder  -     TSH; Future    6. Medication monitoring encounter  -     Lithium level; Future    Other orders  -     diclofenac (VOLTAREN) 75 MG EC tablet; Take 1 tablet by mouth 2 (Two) Times a Day.  Dispense: 60 tablet; Refill: 2             Follow Up   Return if symptoms worsen or fail to improve.  Patient was given instructions and counseling regarding her condition or for health maintenance advice. Please see specific information pulled into the AVS if appropriate.             Answers submitted by the patient for this visit:  Other (Submitted on 1/2/2024)  Please describe your symptoms.: Discomfort in right hip  Have you had these symptoms before?: Yes  How long have you been having these symptoms?: Greater than 2 weeks  Primary Reason for Visit (Submitted on 1/2/2024)  What is the primary reason for your visit?: Other

## 2024-01-05 ENCOUNTER — LAB (OUTPATIENT)
Dept: LAB | Facility: HOSPITAL | Age: 69
End: 2024-01-05
Payer: MEDICARE

## 2024-01-05 ENCOUNTER — HOSPITAL ENCOUNTER (OUTPATIENT)
Dept: GENERAL RADIOLOGY | Facility: HOSPITAL | Age: 69
Discharge: HOME | End: 2024-01-05
Payer: MEDICARE

## 2024-01-05 DIAGNOSIS — Z51.81 MEDICATION MONITORING ENCOUNTER: ICD-10-CM

## 2024-01-05 DIAGNOSIS — M25.551 RIGHT HIP PAIN: ICD-10-CM

## 2024-01-05 DIAGNOSIS — Z13.29 SCREENING FOR THYROID DISORDER: ICD-10-CM

## 2024-01-05 DIAGNOSIS — Z13.220 SCREENING FOR LIPID DISORDERS: ICD-10-CM

## 2024-01-05 DIAGNOSIS — F41.8 DEPRESSION WITH ANXIETY: ICD-10-CM

## 2024-01-05 LAB
ALBUMIN SERPL-MCNC: 4.5 G/DL (ref 3.5–5.2)
ALBUMIN/GLOB SERPL: 1.7 G/DL
ALP SERPL-CCNC: 82 U/L (ref 39–117)
ALT SERPL W P-5'-P-CCNC: 27 U/L (ref 1–33)
ANION GAP SERPL CALCULATED.3IONS-SCNC: 10 MMOL/L (ref 5–15)
AST SERPL-CCNC: 27 U/L (ref 1–32)
BASOPHILS # BLD AUTO: 0.06 10*3/MM3 (ref 0–0.2)
BASOPHILS NFR BLD AUTO: 1.2 % (ref 0–1.5)
BILIRUB SERPL-MCNC: 0.5 MG/DL (ref 0–1.2)
BUN SERPL-MCNC: 26 MG/DL (ref 8–23)
BUN/CREAT SERPL: 21.5 (ref 7–25)
CALCIUM SPEC-SCNC: 9.5 MG/DL (ref 8.6–10.5)
CHLORIDE SERPL-SCNC: 104 MMOL/L (ref 98–107)
CHOLEST SERPL-MCNC: 214 MG/DL (ref 0–200)
CO2 SERPL-SCNC: 23 MMOL/L (ref 22–29)
CREAT SERPL-MCNC: 1.21 MG/DL (ref 0.57–1)
DEPRECATED RDW RBC AUTO: 39.8 FL (ref 37–54)
EGFRCR SERPLBLD CKD-EPI 2021: 49.2 ML/MIN/1.73
EOSINOPHIL # BLD AUTO: 0.18 10*3/MM3 (ref 0–0.4)
EOSINOPHIL NFR BLD AUTO: 3.6 % (ref 0.3–6.2)
ERYTHROCYTE [DISTWIDTH] IN BLOOD BY AUTOMATED COUNT: 12 % (ref 12.3–15.4)
GLOBULIN UR ELPH-MCNC: 2.7 GM/DL
GLUCOSE SERPL-MCNC: 82 MG/DL (ref 65–99)
HCT VFR BLD AUTO: 32.1 % (ref 34–46.6)
HDLC SERPL-MCNC: 82 MG/DL (ref 40–60)
HGB BLD-MCNC: 10.5 G/DL (ref 12–15.9)
IMM GRANULOCYTES # BLD AUTO: 0.01 10*3/MM3 (ref 0–0.05)
IMM GRANULOCYTES NFR BLD AUTO: 0.2 % (ref 0–0.5)
LDLC SERPL CALC-MCNC: 122 MG/DL (ref 0–100)
LDLC/HDLC SERPL: 1.47 {RATIO}
LITHIUM SERPL-SCNC: 0.8 MMOL/L (ref 0.6–1.2)
LYMPHOCYTES # BLD AUTO: 1.59 10*3/MM3 (ref 0.7–3.1)
LYMPHOCYTES NFR BLD AUTO: 31.5 % (ref 19.6–45.3)
MCH RBC QN AUTO: 30.3 PG (ref 26.6–33)
MCHC RBC AUTO-ENTMCNC: 32.7 G/DL (ref 31.5–35.7)
MCV RBC AUTO: 92.5 FL (ref 79–97)
MONOCYTES # BLD AUTO: 0.51 10*3/MM3 (ref 0.1–0.9)
MONOCYTES NFR BLD AUTO: 10.1 % (ref 5–12)
NEUTROPHILS NFR BLD AUTO: 2.69 10*3/MM3 (ref 1.7–7)
NEUTROPHILS NFR BLD AUTO: 53.4 % (ref 42.7–76)
NRBC BLD AUTO-RTO: 0 /100 WBC (ref 0–0.2)
PLATELET # BLD AUTO: 214 10*3/MM3 (ref 140–450)
PMV BLD AUTO: 12 FL (ref 6–12)
POTASSIUM SERPL-SCNC: 4.6 MMOL/L (ref 3.5–5.2)
PROT SERPL-MCNC: 7.2 G/DL (ref 6–8.5)
RBC # BLD AUTO: 3.47 10*6/MM3 (ref 3.77–5.28)
SODIUM SERPL-SCNC: 137 MMOL/L (ref 136–145)
TRIGL SERPL-MCNC: 58 MG/DL (ref 0–150)
TSH SERPL DL<=0.05 MIU/L-ACNC: 2.38 UIU/ML (ref 0.27–4.2)
VLDLC SERPL-MCNC: 10 MG/DL (ref 5–40)
WBC NRBC COR # BLD AUTO: 5.04 10*3/MM3 (ref 3.4–10.8)

## 2024-01-05 PROCEDURE — 80053 COMPREHEN METABOLIC PANEL: CPT

## 2024-01-05 PROCEDURE — 36415 COLL VENOUS BLD VENIPUNCTURE: CPT

## 2024-01-05 PROCEDURE — 80061 LIPID PANEL: CPT

## 2024-01-05 PROCEDURE — 84443 ASSAY THYROID STIM HORMONE: CPT

## 2024-01-05 PROCEDURE — 85025 COMPLETE CBC W/AUTO DIFF WBC: CPT

## 2024-01-05 PROCEDURE — 80178 ASSAY OF LITHIUM: CPT

## 2024-01-05 PROCEDURE — 73502 X-RAY EXAM HIP UNI 2-3 VIEWS: CPT

## 2024-01-05 NOTE — ASSESSMENT & PLAN NOTE
Order for xr   Consider pt/joint injection   Ice/heat 20 min tid prn   Increase rest   Diclofenac 75mg PO bid

## 2024-01-05 NOTE — ASSESSMENT & PLAN NOTE
Patient's depression is single episode and is mild without psychosis. Their depression is currently active and the condition is improving with treatment. This will be reassessed at the next regular appointment. F/U as described:patient will continue current medication therapy. Keep all FU with psych.

## 2024-01-09 ENCOUNTER — TELEPHONE (OUTPATIENT)
Dept: FAMILY MEDICINE CLINIC | Facility: CLINIC | Age: 69
End: 2024-01-09
Payer: MEDICARE

## 2024-01-09 NOTE — TELEPHONE ENCOUNTER
Caller: Alissa Bruce    Relationship: Self    Best call back number: [phone]    What test was performed: BLOOD WORK     When was the test performed: 1.05.24    Where was the test performed: IN OFFICE

## 2024-01-10 DIAGNOSIS — D50.9 IRON DEFICIENCY ANEMIA, UNSPECIFIED IRON DEFICIENCY ANEMIA TYPE: Primary | ICD-10-CM

## 2024-01-18 NOTE — PROGRESS NOTES
"Well Woman Visit    CC: Scheduled annual well gyn visit  Chief Complaint   Patient presents with    Annual Exam       Myriad intake in the past?: No        Post menopausal, using no HRT    HPI:   68 y.o.     PCP: does manage PMHx and preventative labs  History: PMHx, Meds, Allergies, PSHx, Social Hx, and POBHx all reviewed and updated.    Pt has no complaints today.    PHYSICAL EXAM:  /87   Pulse 67   Ht 160 cm (63\")   Wt 64.4 kg (142 lb)   Breastfeeding No   BMI 25.15 kg/m²  Not found.  General- NAD, alert and oriented, appropriate  Psych- Normal mood, good memory  Neck- No masses, no thyroid enlargement  CV- Regular rhythm, no murnurs  Resp- CTA to bases, no wheezes  Abdomen- Soft, non distended, non tender, no masses    Breast left-  Bilaterally symmetrical, no masses, non tender, no nipple discharge  Breast right- Bilaterally symmetrical, no masses, non tender, no nipple discharge    External genitalia- Normal female, no lesions  Urethra/meatus- Normal, no masses, non tender  Bladder- Normal, no masses, non tender  Vagina- Normal, moderate atrophy, no lesions, no discharge.    Cvx- Normal, no lesions, no discharge, No cervical motion tenderness  Uterus- Normal size, shape & consistency.  Non tender, mobile.  Adnexa- No mass, non tender  Anus/Rectum/Perineum- Not performed    Lymphatic- No palpable neck, axillary, or groin nodes  Ext- No edema, no cyanosis    Skin- No lesions, no rashes, no acanthosis nigricans      ASSESSMENT and PLAN:    Diagnoses and all orders for this visit:    1. Well woman exam with routine gynecological exam (Primary)    2. Encounter for screening mammogram for malignant neoplasm of breast  -     Mammo Screening Digital Tomosynthesis Bilateral With CAD; Future    3. Asymptomatic menopause  -     DEXA Bone Density Axial; Future        Preventative:  BREAST HEALTH- Monthly self breast exam importance and how to reviewed. MMG and/or MRI (prn) reviewed per society guidelines " and her individual history. Screen: Pt will call to schedule  CERVICAL CANCER Screening- Reviewed current ASCCP guidelines for screening w and wo cotest HPV, age specific.  Screen: Not medically needed  COLON CANCER Screening- Reviewed current medical society guidelines and options.  Screen:  Already up to date  BONE HEALTH- Reviewed current medical society guidelines and options for testing, calcium and vit D intake.  Weight bearing exercise.  DEXA: Pt will call to schedule  Follow up PCP/Specialist PMHx and/or Labs      She understands the importance of having any ordered tests to be performed in a timely fashion.  The risks of not performing them include, but are not limited to, advanced cancer stages, bone loss from osteoporosis and/or subsequent increase in morbidity and/or mortality.  She is encouraged to review her results online and/or contact or office if she has questions.     Follow Up:  Return in about 1 year (around 1/22/2025) for Annual physical.            Sania Armando MD  01/22/2024    [facility]  [facility] [place] [address]  Dept: [phone]  Dept Fax: [phone]  Loc: [phone]  Loc Fax: [phone]

## 2024-01-22 ENCOUNTER — OFFICE VISIT (OUTPATIENT)
Dept: OBSTETRICS AND GYNECOLOGY | Facility: CLINIC | Age: 69
End: 2024-01-22
Payer: MEDICARE

## 2024-01-22 VITALS
BODY MASS INDEX: 25.16 KG/M2 | HEIGHT: 63 IN | WEIGHT: 142 LBS | SYSTOLIC BLOOD PRESSURE: 157 MMHG | HEART RATE: 67 BPM | DIASTOLIC BLOOD PRESSURE: 87 MMHG

## 2024-01-22 DIAGNOSIS — Z12.31 ENCOUNTER FOR SCREENING MAMMOGRAM FOR MALIGNANT NEOPLASM OF BREAST: ICD-10-CM

## 2024-01-22 DIAGNOSIS — Z01.419 WELL WOMAN EXAM WITH ROUTINE GYNECOLOGICAL EXAM: Primary | ICD-10-CM

## 2024-01-22 DIAGNOSIS — Z78.0 ASYMPTOMATIC MENOPAUSE: ICD-10-CM

## 2024-01-22 PROCEDURE — 1160F RVW MEDS BY RX/DR IN RCRD: CPT | Performed by: OBSTETRICS & GYNECOLOGY

## 2024-01-22 PROCEDURE — G0101 CA SCREEN;PELVIC/BREAST EXAM: HCPCS | Performed by: OBSTETRICS & GYNECOLOGY

## 2024-01-22 PROCEDURE — 1159F MED LIST DOCD IN RCRD: CPT | Performed by: OBSTETRICS & GYNECOLOGY

## 2024-01-23 ENCOUNTER — PATIENT ROUNDING (BHMG ONLY) (OUTPATIENT)
Dept: OBSTETRICS AND GYNECOLOGY | Facility: CLINIC | Age: 69
End: 2024-01-23
Payer: MEDICARE

## 2024-01-23 NOTE — PROGRESS NOTES
A My-Chart message has been sent to the patient for [place] [facility]

## 2024-02-05 DIAGNOSIS — F51.05 INSOMNIA DUE TO MENTAL CONDITION: ICD-10-CM

## 2024-02-05 DIAGNOSIS — F33.1 MAJOR DEPRESSIVE DISORDER, RECURRENT EPISODE, MODERATE: ICD-10-CM

## 2024-02-05 RX ORDER — BUPROPION HYDROCHLORIDE 100 MG/1
TABLET, EXTENDED RELEASE ORAL
Qty: 180 TABLET | Refills: 1 | Status: SHIPPED | OUTPATIENT
Start: 2024-02-05

## 2024-02-05 RX ORDER — DOXEPIN HYDROCHLORIDE 25 MG/1
CAPSULE ORAL
Qty: 90 CAPSULE | Refills: 1 | Status: SHIPPED | OUTPATIENT
Start: 2024-02-05

## 2024-02-05 NOTE — TELEPHONE ENCOUNTER
NEXT VISIT WITH PROVIDER   Appointment with Kai Elliott MD (06/28/2024)     LAST SEEN BY PROVIDER   Office Visit with Kai Elliott MD (12/29/2023)     LAST MED REFILL  buPROPion SR (WELLBUTRIN SR) 100 MG 12 hr tablet (08/08/2023)  Dispense Quantity: 180 tablet Refills: 1    Note to Pharmacy: 08/08/2023 1:33:51 PM* * N O T I C E * * Last quantity doesn't match original quantity         Sig: TAKE 1 TABLET BY MOUTH TWICE DAILY   doxepin (SINEquan) 25 MG capsule (08/08/2023)  Dispense Quantity: 90 capsule Refills: 1    Note to Pharmacy: 08/08/2023 1:33:27 PM* * N O T I C E * * Last quantity doesn't match original quantity         Sig: TAKE 1 CAPSULE BY MOUTH AT BEDTIME     PROVIDER PLEASE ADVISE

## 2024-02-09 ENCOUNTER — LAB (OUTPATIENT)
Dept: LAB | Facility: HOSPITAL | Age: 69
End: 2024-02-09
Payer: MEDICARE

## 2024-02-09 DIAGNOSIS — D50.9 IRON DEFICIENCY ANEMIA, UNSPECIFIED IRON DEFICIENCY ANEMIA TYPE: ICD-10-CM

## 2024-02-09 LAB
ALBUMIN SERPL-MCNC: 4.4 G/DL (ref 3.5–5.2)
ALBUMIN/GLOB SERPL: 1.8 G/DL
ALP SERPL-CCNC: 96 U/L (ref 39–117)
ALT SERPL W P-5'-P-CCNC: 31 U/L (ref 1–33)
ANION GAP SERPL CALCULATED.3IONS-SCNC: 10.6 MMOL/L (ref 5–15)
AST SERPL-CCNC: 32 U/L (ref 1–32)
BASOPHILS # BLD AUTO: 0.06 10*3/MM3 (ref 0–0.2)
BASOPHILS NFR BLD AUTO: 1 % (ref 0–1.5)
BILIRUB SERPL-MCNC: 0.3 MG/DL (ref 0–1.2)
BUN SERPL-MCNC: 22 MG/DL (ref 8–23)
BUN/CREAT SERPL: 20.4 (ref 7–25)
CALCIUM SPEC-SCNC: 9.3 MG/DL (ref 8.6–10.5)
CHLORIDE SERPL-SCNC: 103 MMOL/L (ref 98–107)
CO2 SERPL-SCNC: 24.4 MMOL/L (ref 22–29)
CREAT SERPL-MCNC: 1.08 MG/DL (ref 0.57–1)
DEPRECATED RDW RBC AUTO: 40.5 FL (ref 37–54)
EGFRCR SERPLBLD CKD-EPI 2021: 56.4 ML/MIN/1.73
EOSINOPHIL # BLD AUTO: 0.22 10*3/MM3 (ref 0–0.4)
EOSINOPHIL NFR BLD AUTO: 3.7 % (ref 0.3–6.2)
ERYTHROCYTE [DISTWIDTH] IN BLOOD BY AUTOMATED COUNT: 12.1 % (ref 12.3–15.4)
GLOBULIN UR ELPH-MCNC: 2.5 GM/DL
GLUCOSE SERPL-MCNC: 80 MG/DL (ref 65–99)
HCT VFR BLD AUTO: 31.6 % (ref 34–46.6)
HGB BLD-MCNC: 10.7 G/DL (ref 12–15.9)
IMM GRANULOCYTES # BLD AUTO: 0.01 10*3/MM3 (ref 0–0.05)
IMM GRANULOCYTES NFR BLD AUTO: 0.2 % (ref 0–0.5)
IRON 24H UR-MRATE: 65 MCG/DL (ref 37–145)
IRON SATN MFR SERPL: 18 % (ref 20–50)
LYMPHOCYTES # BLD AUTO: 1.63 10*3/MM3 (ref 0.7–3.1)
LYMPHOCYTES NFR BLD AUTO: 27.4 % (ref 19.6–45.3)
MCH RBC QN AUTO: 31.7 PG (ref 26.6–33)
MCHC RBC AUTO-ENTMCNC: 33.9 G/DL (ref 31.5–35.7)
MCV RBC AUTO: 93.5 FL (ref 79–97)
MONOCYTES # BLD AUTO: 0.61 10*3/MM3 (ref 0.1–0.9)
MONOCYTES NFR BLD AUTO: 10.3 % (ref 5–12)
NEUTROPHILS NFR BLD AUTO: 3.41 10*3/MM3 (ref 1.7–7)
NEUTROPHILS NFR BLD AUTO: 57.4 % (ref 42.7–76)
NRBC BLD AUTO-RTO: 0 /100 WBC (ref 0–0.2)
PLATELET # BLD AUTO: 197 10*3/MM3 (ref 140–450)
PMV BLD AUTO: 12 FL (ref 6–12)
POTASSIUM SERPL-SCNC: 4.6 MMOL/L (ref 3.5–5.2)
PROT SERPL-MCNC: 6.9 G/DL (ref 6–8.5)
RBC # BLD AUTO: 3.38 10*6/MM3 (ref 3.77–5.28)
SODIUM SERPL-SCNC: 138 MMOL/L (ref 136–145)
TIBC SERPL-MCNC: 364 MCG/DL (ref 298–536)
TRANSFERRIN SERPL-MCNC: 244 MG/DL (ref 200–360)
WBC NRBC COR # BLD AUTO: 5.94 10*3/MM3 (ref 3.4–10.8)

## 2024-02-09 PROCEDURE — 80053 COMPREHEN METABOLIC PANEL: CPT

## 2024-02-09 PROCEDURE — 83540 ASSAY OF IRON: CPT

## 2024-02-09 PROCEDURE — 36415 COLL VENOUS BLD VENIPUNCTURE: CPT

## 2024-02-09 PROCEDURE — 85025 COMPLETE CBC W/AUTO DIFF WBC: CPT

## 2024-02-09 PROCEDURE — 84466 ASSAY OF TRANSFERRIN: CPT

## 2024-02-27 ENCOUNTER — HOSPITAL ENCOUNTER (OUTPATIENT)
Dept: MAMMOGRAPHY | Facility: HOSPITAL | Age: 69
Discharge: HOME | End: 2024-02-27
Payer: MEDICARE

## 2024-02-27 ENCOUNTER — HOSPITAL ENCOUNTER (OUTPATIENT)
Dept: BONE DENSITY | Facility: HOSPITAL | Age: 69
Discharge: HOME | End: 2024-02-27
Payer: MEDICARE

## 2024-02-27 DIAGNOSIS — Z78.0 ASYMPTOMATIC MENOPAUSE: ICD-10-CM

## 2024-02-27 DIAGNOSIS — Z12.31 ENCOUNTER FOR SCREENING MAMMOGRAM FOR MALIGNANT NEOPLASM OF BREAST: ICD-10-CM

## 2024-02-27 PROCEDURE — 77067 SCR MAMMO BI INCL CAD: CPT

## 2024-02-27 PROCEDURE — 77080 DXA BONE DENSITY AXIAL: CPT

## 2024-02-27 PROCEDURE — 77063 BREAST TOMOSYNTHESIS BI: CPT

## 2024-02-29 RX ORDER — PREDNISONE 20 MG/1
20 TABLET ORAL 2 TIMES DAILY
Qty: 14 TABLET | Refills: 0 | Status: SHIPPED | OUTPATIENT
Start: 2024-02-29

## 2024-02-29 RX ORDER — DICLOFENAC SODIUM 75 MG/1
75 TABLET, DELAYED RELEASE ORAL 2 TIMES DAILY PRN
Qty: 60 TABLET | Refills: 2 | Status: SHIPPED | OUTPATIENT
Start: 2024-02-29

## 2024-03-05 DIAGNOSIS — M81.0 AGE RELATED OSTEOPOROSIS, UNSPECIFIED PATHOLOGICAL FRACTURE PRESENCE: Primary | ICD-10-CM

## 2024-03-07 ENCOUNTER — OFFICE VISIT (OUTPATIENT)
Dept: FAMILY MEDICINE CLINIC | Facility: CLINIC | Age: 69
End: 2024-03-07
Payer: MEDICARE

## 2024-03-07 VITALS
TEMPERATURE: 98.4 F | DIASTOLIC BLOOD PRESSURE: 74 MMHG | SYSTOLIC BLOOD PRESSURE: 155 MMHG | BODY MASS INDEX: 25.8 KG/M2 | WEIGHT: 145.6 LBS | HEIGHT: 63 IN | HEART RATE: 73 BPM | OXYGEN SATURATION: 100 %

## 2024-03-07 DIAGNOSIS — N18.31 STAGE 3A CHRONIC KIDNEY DISEASE (CKD): ICD-10-CM

## 2024-03-07 DIAGNOSIS — F41.8 DEPRESSION WITH ANXIETY: ICD-10-CM

## 2024-03-07 DIAGNOSIS — D63.1 ANEMIA DUE TO STAGE 3A CHRONIC KIDNEY DISEASE: ICD-10-CM

## 2024-03-07 DIAGNOSIS — M70.61 TROCHANTERIC BURSITIS OF RIGHT HIP: Primary | ICD-10-CM

## 2024-03-07 DIAGNOSIS — I10 ELEVATED SYSTOLIC BLOOD PRESSURE READING WITH DIAGNOSIS OF HYPERTENSION: ICD-10-CM

## 2024-03-07 DIAGNOSIS — N18.31 ANEMIA DUE TO STAGE 3A CHRONIC KIDNEY DISEASE: ICD-10-CM

## 2024-03-20 NOTE — PROGRESS NOTES
"Chief Complaint  Follow-up (Follow up on low back/SI pain.  Prednisone did help, she took this for 9 days then stopped due to bloating. )    Subjective          Alissa Bruce presents to [facility]  History of Present Illness    Patient following up on low back pain and SI pain doing better prednisone did help no worsening signs or symptoms      Objective   Vital Signs:   /74 (BP Location: Right arm, Patient Position: Sitting, Cuff Size: Adult)   Pulse 73   Temp 98.4 °F (36.9 °C) (Tympanic)   Ht 160 cm (63\")   Wt 66 kg (145 lb 9.6 oz)   SpO2 100%   BMI 25.79 kg/m²     BMI is >= 25 and <30. (Overweight) The following options were offered after discussion;: exercise counseling/recommendations      Physical Exam  Vitals reviewed.   Constitutional:       Appearance: Normal appearance. She is well-developed.   HENT:      Head: Normocephalic and atraumatic.      Right Ear: External ear normal.      Left Ear: External ear normal.      Nose: Nose normal.   Eyes:      Conjunctiva/sclera: Conjunctivae normal.      Pupils: Pupils are equal, round, and reactive to light.   Cardiovascular:      Rate and Rhythm: Normal rate.   Pulmonary:      Effort: Pulmonary effort is normal.      Breath sounds: Normal breath sounds.   Abdominal:      General: There is no distension.   Skin:     General: Skin is warm and dry.   Neurological:      Mental Status: She is alert and oriented to person, place, and time. Mental status is at baseline.   Psychiatric:         Mood and Affect: Mood and affect normal.         Behavior: Behavior normal.         Thought Content: Thought content normal.         Judgment: Judgment normal.          Result Review :                     Assessment and Plan    Diagnoses and all orders for this visit:    1. Trochanteric bursitis of right hip (Primary)    2. Elevated systolic blood pressure reading with diagnosis of hypertension    3. Stage 3a chronic kidney disease " (CKD)    4. Depression with anxiety    5. Anemia due to stage 3a chronic kidney disease      Will continue monitoring blood pressure at home and follow-up in improvement with other conditions or progression of symptoms      Follow Up   Return in about 4 weeks (around 4/4/2024), or if symptoms worsen or fail to improve, for Next scheduled follow up, Recheck, BP & Log.  Patient was given instructions and counseling regarding her condition or for health maintenance advice. Please see specific information pulled into the AVS if appropriate.     Transcribed from ambient dictation for Rubén Garcia DO by Rubén Garcia DO.  03/20/24   08:30 EDT

## 2024-04-08 ENCOUNTER — TELEPHONE (OUTPATIENT)
Dept: FAMILY MEDICINE CLINIC | Facility: CLINIC | Age: 69
End: 2024-04-08

## 2024-04-15 ENCOUNTER — OFFICE VISIT (OUTPATIENT)
Dept: FAMILY MEDICINE CLINIC | Facility: CLINIC | Age: 69
End: 2024-04-15
Payer: MEDICARE

## 2024-04-15 VITALS
BODY MASS INDEX: 25.59 KG/M2 | HEIGHT: 63 IN | RESPIRATION RATE: 16 BRPM | WEIGHT: 144.4 LBS | TEMPERATURE: 97.1 F | DIASTOLIC BLOOD PRESSURE: 71 MMHG | HEART RATE: 74 BPM | SYSTOLIC BLOOD PRESSURE: 133 MMHG | OXYGEN SATURATION: 100 %

## 2024-04-15 DIAGNOSIS — M70.61 TROCHANTERIC BURSITIS OF RIGHT HIP: Primary | ICD-10-CM

## 2024-04-15 PROCEDURE — 1160F RVW MEDS BY RX/DR IN RCRD: CPT | Performed by: FAMILY MEDICINE

## 2024-04-15 PROCEDURE — 3078F DIAST BP <80 MM HG: CPT | Performed by: FAMILY MEDICINE

## 2024-04-15 PROCEDURE — 20610 DRAIN/INJ JOINT/BURSA W/O US: CPT | Performed by: FAMILY MEDICINE

## 2024-04-15 PROCEDURE — 3075F SYST BP GE 130 - 139MM HG: CPT | Performed by: FAMILY MEDICINE

## 2024-04-15 PROCEDURE — 1159F MED LIST DOCD IN RCRD: CPT | Performed by: FAMILY MEDICINE

## 2024-04-15 PROCEDURE — 99213 OFFICE O/P EST LOW 20 MIN: CPT | Performed by: FAMILY MEDICINE

## 2024-04-15 RX ORDER — TRIAMCINOLONE ACETONIDE 40 MG/ML
40 INJECTION, SUSPENSION INTRA-ARTICULAR; INTRAMUSCULAR
Status: COMPLETED | OUTPATIENT
Start: 2024-04-15 | End: 2024-04-15

## 2024-04-15 RX ADMIN — TRIAMCINOLONE ACETONIDE 40 MG: 40 INJECTION, SUSPENSION INTRA-ARTICULAR; INTRAMUSCULAR at 10:16

## 2024-04-15 NOTE — PROGRESS NOTES
"Hi pChief Complaint  Injections and Bursitis (Right side hip)    Subjective          Alissa Bruce presents to [facility]  History of Present Illness    Patient presents with right hip pain ongoing for several weeks to months pain all over trochanteric area likely bursitis    Objective   Vital Signs:   /71 (BP Location: Left arm, Patient Position: Sitting, Cuff Size: Adult)   Pulse 74   Temp 97.1 °F (36.2 °C)   Resp 16   Ht 160 cm (63\")   Wt 65.5 kg (144 lb 6.4 oz)   SpO2 100%   BMI 25.58 kg/m²            Physical Exam  Vitals reviewed.   Constitutional:       Appearance: Normal appearance. She is well-developed.   HENT:      Head: Normocephalic and atraumatic.      Right Ear: External ear normal.      Left Ear: External ear normal.      Nose: Nose normal.   Eyes:      Conjunctiva/sclera: Conjunctivae normal.      Pupils: Pupils are equal, round, and reactive to light.   Cardiovascular:      Rate and Rhythm: Normal rate.   Pulmonary:      Effort: Pulmonary effort is normal.      Breath sounds: Normal breath sounds.   Abdominal:      General: There is no distension.   Skin:     General: Skin is warm and dry.   Neurological:      Mental Status: She is alert and oriented to person, place, and time. Mental status is at baseline.   Psychiatric:         Mood and Affect: Mood and affect normal.         Behavior: Behavior normal.         Thought Content: Thought content normal.         Judgment: Judgment normal.          Result Review :            Arthrocentesis    Date/Time: 4/15/2024 10:16 AM    Performed by: Rubén Garcia DO  Authorized by: Rubén Garcia DO  Indications: pain   Body area: hip  Joint: right hip  Local anesthesia used: yes    Anesthesia:  Local anesthesia used: yes  Local Anesthetic: lidocaine 1% with epinephrine  Anesthetic total: 4 mL    Sedation:  Patient sedated: no    Needle gauge: 25.  Ultrasound guidance: no  Approach: lateral  Aspirate: " clear  Aspirate amount: 0.1 mL  Meds administered: 40 mg triamcinolone acetonide 40 MG/ML  Patient tolerance: patient tolerated the procedure well with no immediate complications              Assessment and Plan    Diagnoses and all orders for this visit:    1. Trochanteric bursitis of right hip (Primary)    Other orders  -     Arthrocentesis      Injection hip without complication patient tolerated follow-up with no improvement or worse can continue with other medicines as prescribed and activities as able        Follow Up   No follow-ups on file.  Patient was given instructions and counseling regarding her condition or for health maintenance advice. Please see specific information pulled into the AVS if appropriate.     Transcribed from ambient dictation for Rubén Garcia DO by Rubén Garcia DO.  04/15/24   10:15 EDT

## 2024-05-02 ENCOUNTER — LAB (OUTPATIENT)
Dept: LAB | Facility: HOSPITAL | Age: 69
End: 2024-05-02
Payer: MEDICARE

## 2024-05-02 ENCOUNTER — TRANSCRIBE ORDERS (OUTPATIENT)
Dept: LAB | Facility: HOSPITAL | Age: 69
End: 2024-05-02
Payer: MEDICARE

## 2024-05-02 DIAGNOSIS — Z79.899 ENCOUNTER FOR LONG-TERM (CURRENT) USE OF OTHER MEDICATIONS: Primary | ICD-10-CM

## 2024-05-02 DIAGNOSIS — M81.0 SENILE OSTEOPOROSIS: ICD-10-CM

## 2024-05-02 DIAGNOSIS — Z79.899 ENCOUNTER FOR LONG-TERM (CURRENT) USE OF OTHER MEDICATIONS: ICD-10-CM

## 2024-05-02 LAB
25(OH)D3 SERPL-MCNC: 36.3 NG/ML (ref 30–100)
CALCIUM SPEC-SCNC: 9.5 MG/DL (ref 8.6–10.5)
CREAT SERPL-MCNC: 1.34 MG/DL (ref 0.57–1)
EGFRCR SERPLBLD CKD-EPI 2021: 43.5 ML/MIN/1.73
PTH-INTACT SERPL-MCNC: 85.1 PG/ML (ref 15–65)

## 2024-05-02 PROCEDURE — 36415 COLL VENOUS BLD VENIPUNCTURE: CPT

## 2024-05-02 PROCEDURE — 82310 ASSAY OF CALCIUM: CPT

## 2024-05-02 PROCEDURE — 82565 ASSAY OF CREATININE: CPT

## 2024-05-02 PROCEDURE — 83970 ASSAY OF PARATHORMONE: CPT

## 2024-05-02 PROCEDURE — 82306 VITAMIN D 25 HYDROXY: CPT

## 2024-06-04 DIAGNOSIS — F33.1 MAJOR DEPRESSIVE DISORDER, RECURRENT EPISODE, MODERATE: ICD-10-CM

## 2024-06-04 DIAGNOSIS — F41.1 GENERALIZED ANXIETY DISORDER: ICD-10-CM

## 2024-06-04 DIAGNOSIS — F51.05 INSOMNIA DUE TO MENTAL CONDITION: ICD-10-CM

## 2024-06-04 RX ORDER — LITHIUM CARBONATE 150 MG/1
300 CAPSULE ORAL
Qty: 180 CAPSULE | Refills: 3 | Status: SHIPPED | OUTPATIENT
Start: 2024-06-04

## 2024-06-04 NOTE — TELEPHONE ENCOUNTER
NEXT VISIT WITH PROVIDER   Appointment with Kai Elliott MD (06/25/2024)     LAST SEEN BY PROVIDER   Office Visit with Kai Elliott MD (12/29/2023)     LAST MED REFILL  lithium carbonate 150 MG capsule (12/05/2023)     MOST RECENT LABS   Creatinine Serum (kidney function) GFR component (05/02/2024 11:59)  Lithium level (01/05/2024 09:17)    PROVIDER PLEASE ADVISE

## 2024-06-25 ENCOUNTER — OFFICE VISIT (OUTPATIENT)
Dept: PSYCHIATRY | Facility: CLINIC | Age: 69
End: 2024-06-25
Payer: MEDICARE

## 2024-06-25 VITALS
WEIGHT: 142 LBS | HEIGHT: 63 IN | BODY MASS INDEX: 25.16 KG/M2 | SYSTOLIC BLOOD PRESSURE: 141 MMHG | HEART RATE: 91 BPM | DIASTOLIC BLOOD PRESSURE: 77 MMHG

## 2024-06-25 DIAGNOSIS — F41.1 GENERALIZED ANXIETY DISORDER: ICD-10-CM

## 2024-06-25 DIAGNOSIS — F31.81 HYPOMANIC BIPOLAR II DISORDER WITH FULL REMISSION: ICD-10-CM

## 2024-06-25 DIAGNOSIS — F33.1 MAJOR DEPRESSIVE DISORDER, RECURRENT EPISODE, MODERATE: Primary | ICD-10-CM

## 2024-06-25 DIAGNOSIS — F51.05 INSOMNIA DUE TO MENTAL CONDITION: ICD-10-CM

## 2024-06-25 PROCEDURE — 1160F RVW MEDS BY RX/DR IN RCRD: CPT | Performed by: STUDENT IN AN ORGANIZED HEALTH CARE EDUCATION/TRAINING PROGRAM

## 2024-06-25 PROCEDURE — 3077F SYST BP >= 140 MM HG: CPT | Performed by: STUDENT IN AN ORGANIZED HEALTH CARE EDUCATION/TRAINING PROGRAM

## 2024-06-25 PROCEDURE — 1159F MED LIST DOCD IN RCRD: CPT | Performed by: STUDENT IN AN ORGANIZED HEALTH CARE EDUCATION/TRAINING PROGRAM

## 2024-06-25 PROCEDURE — 99214 OFFICE O/P EST MOD 30 MIN: CPT | Performed by: STUDENT IN AN ORGANIZED HEALTH CARE EDUCATION/TRAINING PROGRAM

## 2024-06-25 PROCEDURE — 3078F DIAST BP <80 MM HG: CPT | Performed by: STUDENT IN AN ORGANIZED HEALTH CARE EDUCATION/TRAINING PROGRAM

## 2024-06-25 RX ORDER — CIDER VINEGAR 300 MG
TABLET ORAL
COMMUNITY

## 2024-06-25 RX ORDER — TERIPARATIDE 250 UG/ML
20 INJECTION, SOLUTION SUBCUTANEOUS DAILY
COMMUNITY
Start: 2024-06-20

## 2024-06-25 NOTE — PATIENT INSTRUCTIONS
1.  Please return to clinic at your next scheduled visit.  Contact the clinic ([phone]) at least 24 hours prior in the event you need to cancel.  2.  Do no harm to yourself or others.    3.  Avoid alcohol and drugs.    4.  Take all medications as prescribed.  Please contact the clinic with any concerns. If you are in need of medication refills, please call the clinic at [phone].    5. Should you want to get in touch with your provider, Dr. Kai Elliott, please utilize Walkabout or contact the office ([phone]), and staff will be able to page Dr. Elliott directly.  6.  In the event you have personal crisis, contact the following crisis numbers: Suicide Prevention Hotline 1-504.243.3952; TREASURE Helpline 3-943-864-TREASURE; [facility] Emergency Room [phone]; text HELLO to 408615; or 217.

## 2024-06-25 NOTE — PROGRESS NOTES
"Subjective   Alissa Bruce is a 68 y.o. female who presents today for initial evaluation     Referring Provider:  No referring provider defined for this encounter.    Chief Complaint: Anxiety    History of Present Illness:     22: Initial Chart review: Patient has a history of anxiety for several years, followed by [facility] during this time for medication management with little success.  Sent to us for a second opinion.  On Wellbutrin  twice daily, doxepin 25 nightly, Klonopin 1 mg p.o. 3 times daily as needed, but she takes about 1-1/2 tabs daily.  Nothing in PDMP.  No Care Everywhere.  Older labs from 2021 show reassuring CMP, thyroid studies, folate, iron studies, elevated lipids, reassuring B12, vitamin D, CBC.  No head imaging, EKG.    Psychotropic medication review includes Ativan, Vraylar, mirtazapine, Seroquel XR, Celexa, Cymbalta, Latuda, Adderall XR, Rexulti, Prozac, Abilify, Lamictal, BuSpar, Zoloft, Lexapro, Effexor.  There may have been others prior to [facility] management.      \"Alissa\" and \"Feng\"   Most concerning symptom: anxiety  Consider auvelity, MAOIs  Patient Notes:  Patient goals:  Get back to gardening  Misc:    Chart review:   24: multiple visits. Cr 1.34 (persistently high since ), benign mammogram.  no new.  Plannin/29: Stable, well, watch hair loss (can occur in up  to 10% taking lithium), may need to start zinc/selenium supplements. 6m  : stable, well. No changes. If elevated bps at home over the next 30 days, pt to call me and pcp. 6 mos.   stable, well.      Visits (Below):    2024: In person interview:  \"I've been great.\"  No complaints  Mood/Depression: stable, denies hypomanic s/sx  Anxiety: stable  Panic attacks: n  Energy: stable  Concentration: stable  Sleeping: stable  Eatin, 140 lbs  Refills: y  Substances: def  Therapy: n  Medication compliant: y  SE: n  No SI HI AVH.      : In person interview:  \"I'm doing well, no " "complaints.\"  Elevated Bps. If stays up over a month, call pcp and me with an update.  Having some hair loss  Mood/Depression: stable, denies hypomanic s/sx  Anxiety: stable  Panic attacks: n  Energy: stable  Concentration: stable  Sleeping: stable  Eatin lbs  Refills: y  Substances: def  Therapy: n  Medication compliant: y  SE: n  No SI HI AVH.      : In person interview:  Chart review: no new.  Planning: stable, well.  \"I'm doing well, no complaints.\"  Elevated Bps. If stays up over a month, call pcp and me with an update.  Mood/Depression: doing well  Anxiety: doing well  Panic attacks: n  Energy: good  Concentration: good  Sleeping: well  Eating: stable, 5 lb wl 3/23  Refills: y  Substances: def  Therapy: n  Medication compliant: y  SE: n  No SI HI AVH.      : In person interview:  Chart review: No new.  Planning: Much improved, \"best I've been in 5 years.\" No changes now, low threshold to increase lithium to 450 mg. May need neuro workup in the past due to clear bipolar II late-emerging hypomania.  Watch sleeping closely, may need to increase doxepin.   \"Good, real good.\"  I'm in a good spot. It's a big change. It's great. Mowed the grass yesterday. Feng is really happy too.  Has a sister who is bipolar  Mood/Depression: minimal  Anxiety: minmal  Panic attacks: n, not even taking clonazepam  Energy: good  Concentration: good  Sleeping: well  Eating: stable  Refills: y  Substances: n  Therapy: n  Medication compliant: y  SE: n  No SI HI AVH.      3/31: In person interview:  Chart review: No new.  Planning: [place] today 27 out of 30. Longer appnt. Seems more hypomanic now. Increase lithium, watch dry lip. SE with every med?  Emphasize the importance of medication compliance.  Otherwise, what happened at [facility] is going to happen with us.  \"It's much better.\"  I've been getting out of the house.   Mood/Depression: good  Anxiety: less fear, it's better, much better  Clonazepam helps, but doesn't need it. " "Hasn't taken it in a while.   Panic attacks: yes, controlled on klonopin.  Energy: more  Concentration: Good  Sleeping: Fairly well  Eating: Stable  Refills: No  Substances: No  Therapy: No  Medication compliant: y  No SI HI AVH.      3/7: In person interview:  Chart review: No new.  Planning: [place] at next visit, then neuropsych testing. Seems hypomanic, improved but not tolerating abilify. Stop abilify and start lithium qhs.  \"My lips and eyes are dry.\"  I haven't noticed any change as far as any improvement.  I think my lip is dryer.  Had nightmares initially when taking it.  Mood/Depression: stable  Anxiety: stable  Panic attacks: n  Energy: stable  Concentration: stable  Sleeping: fairly good, (again, stopped CPAP machine)  Eatin lb wg   Refills: y  Substances: n  Therapy: n  Medication compliant: y  No SI HI AVH.      : In person interview:  Chart review: Patient discontinued Abilify stating that it made her anxious.  Urgent appointment.  Planning: Increase abilify, a bit more linear. Answered questions more accurately. Appears bipolar hypomanic.  Depakote or lithium?  Could also add propranolol for akathisia to see if that helps her tolerate Abilify better.  We want to give each medication a good try.  We do not want to get into the cycle of trying and failing medications too quickly.  Neuropsych testing.  \"Abilify was just making me more anxious.\"  Not talking over me at all. Still a little tangential, but even this has improved. Still talkative.  Periods of silence.  Stopped abilify over a week ago.  My sister was on lithium before  Mood/Depression:  I am never really depressed  Anxiety:  I get nervous going out.  I was more irritable  Panic attacks: n  Energy: poor  Concentration: poor  Sleeping:   Well at times  Not using CPAP machine  Eating: stable  Refills: y  Substances: n  Therapy: n  Medication compliant: y  No SI HI AVH.      : In person interview:  Chart review: No new.  Planning: " "Very difficult patient.  Started Abilify at last visit.  \"I couldn't tell much of a difference.\"  Records from Delfina Quevedo, but not from Kamilah Dee, which have been requested.  Switched taking the abilify to the mornings.  Most concerning symptom is \"anxiety\"  Chronic constipation  Mood/Depression:  Anxiety: feel anxious  Panic attacks:  Energy: tired all the time, for a long time  Unsure if it is related to abilify  It's been 5 years  Concentration: not at goal  Sleeping:   On CPAP  Eating: 3 lb wg 12/22  Refills: y  Substances: n  Therapy: n  Medication compliant: y  Take klonopin 1.5 total daily  No SI HI AVH.      12/22: In person.  Interview:  Chart review:   Would rule out ADHD.  Would rule out PTSD, consider guanfacine, clonidine, prazosin.  His/Her Story: \"I was working with Delfina Quevedo.\"  P15, G14  Had been working with Kamilah Dee also, and Dr. Wallace  I never even saw Delfina -- maybe 2 years  Talks over me, talkative, but interruptible, long silences too  One day I woke up and \"something wasn't right\" 5 years ago  Didn't like the ativan  6 mos I was perfectly fine: 2018 (jan to June)?  2017, multiple losses, when it all began  Discussed how klonopin is addictive  Everything that I do \"excites me\"  Most of the day I watch TV  Depression/Mood:  Depressed mood, anhedonia, hopelessness, poor energy, poor concentration, weight loss or weight gain, psychomotor retardation or agitation, insomnia controlled on clonazepam.  Seasonal pattern:  Severity: Moderate  Duration: 5 years ago  Anxiety:  Uncontrolled worrying, muscle tension, fatigue, poor concentration, feeling on edge, irritability, insomnia.  Severity: Moderate to severe  Duration: 5 years ago  Panic attacks: n  Psych ROS: Positive for depression, anxiety.  Negative for psychosis and williams.  ADHD: Deferred  PTSD: Deferred  No SI HI AVH.  Substances: No  Therapy: Briefly  Medication compliant: y    Access to Firearms: Yes, locked away    PHQ-9 " Depression Screening  PHQ-9 Total Score: (P) 0    Little interest or pleasure in doing things? (P) 0-->not at all   Feeling down, depressed, or hopeless? (P) 0-->not at all   Trouble falling or staying asleep, or sleeping too much? (P) 0-->not at all   Feeling tired or having little energy? (P) 0-->not at all   Poor appetite or overeating? (P) 0-->not at all   Feeling bad about yourself - or that you are a failure or have let yourself or your family down? (P) 0-->not at all   Trouble concentrating on things, such as reading the newspaper or watching television? (P) 0-->not at all   Moving or speaking so slowly that other people could have noticed? Or the opposite - being so fidgety or restless that you have been moving around a lot more than usual? (P) 0-->not at all   Thoughts that you would be better off dead, or of hurting yourself in some way? (P) 0-->not at all   PHQ-9 Total Score (P) 0     VITOR-7       Past Surgical History:  Past Surgical History:   Procedure Laterality Date    COLONOSCOPY  Age 52    CYST REMOVAL Right 1977    wrist    EYE SURGERY  2004    Lasik to correct vision    TUBAL ABDOMINAL LIGATION  [id number]       Problem List:  Patient Active Problem List   Diagnosis    Depression with anxiety    Right hip pain    Encounter for annual wellness exam in Medicare patient    Trochanteric bursitis of right hip    Elevated systolic blood pressure reading with diagnosis of hypertension    Anemia due to stage 3a chronic kidney disease       Allergy:   Allergies   Allergen Reactions    Sulfa Antibiotics Unknown - Low Severity and Other (See Comments)        Discontinued Medications:  There are no discontinued medications.      Current Medications:   Current Outpatient Medications   Medication Sig Dispense Refill    buPROPion SR (WELLBUTRIN SR) 100 MG 12 hr tablet TAKE 1 TABLET BY MOUTH TWICE DAILY 180 tablet 1    doxepin (SINEquan) 25 MG capsule TAKE 1 CAPSULE BY MOUTH AT BEDTIME 90 capsule 1    lithium  carbonate 150 MG capsule TAKE 2 CAPSULES BY MOUTH AT BEDTIME 180 capsule 3    Omega-3 Fatty Acids (Fish Oil) 1000 MG capsule delayed-release Take  by mouth.      polyethylene glycol (MIRALAX) 17 g packet Take 17 g by mouth Daily.      Teriparatide, Recombinant, (FORTEO) 600 MCG/2.4ML injection Inject 0.08 mL under the skin into the appropriate area as directed Daily.       No current facility-administered medications for this visit.       Past Medical History:  Past Medical History:   Diagnosis Date    Anxiety     Depression     Elevated systolic blood pressure reading with diagnosis of hypertension 3/7/2024    Osteopenia     Visual impairment     Dry eyes       Past Psychiatric History:  Began Treatment: a few years ago  Diagnoses: dep and anx, bipolar 2  Psychiatrist: [facility]  Therapist: yes briefly  Admission History:Denies  Medication Trials:    Multiple, can't remember inidivdual effects    Only one that helped me get back to my old self was zoloft during those six months    Self Harm: Denies  Suicide Attempts:Denies   Psychosis, Anxiety, Depression: Denies    Substance Abuse History:   Types:Denies all, including illicit  Withdrawal Symptoms:Denies  Longest Period Sober:Not Applicable   AA: Not applicable     Social History:  Martial Status:  Employed:No  Kids:Yes  House:Lives in a house   History: Denies    Social History     Socioeconomic History    Marital status:    Tobacco Use    Smoking status: Never     Passive exposure: Past    Smokeless tobacco: Never   Vaping Use    Vaping status: Never Used   Substance and Sexual Activity    Alcohol use: Never    Drug use: Never    Sexual activity: Not Currently     Partners: Male     Birth control/protection: Post-menopausal, Tubal ligation, Surgical     Comment: Cyst removal right wrist       Family History:   Suicide Attempts: Denies  Suicide Completions:Denies      Family History   Problem Relation Age of Onset    No Known Problems  "Father     Hyperlipidemia Mother     Arthritis Brother     Bipolar disorder Sister         [id number] ()    Mental illness Sister     Depression Sister             No Known Problems Paternal Grandfather     No Known Problems Paternal Grandmother     No Known Problems Maternal Grandmother     No Known Problems Maternal Grandfather     No Known Problems Maternal Aunt     No Known Problems Maternal Uncle     No Known Problems Paternal Aunt     No Known Problems Paternal Uncle     No Known Problems Cousin     ADD / ADHD Neg Hx     Alcohol abuse Neg Hx     Anxiety disorder Neg Hx     Dementia Neg Hx     Drug abuse Neg Hx     OCD Neg Hx     Paranoid behavior Neg Hx     Schizophrenia Neg Hx     Seizures Neg Hx     Self-Injurious Behavior  Neg Hx     Suicide Attempts Neg Hx     Breast cancer Neg Hx     Uterine cancer Neg Hx     Ovarian cancer Neg Hx     Colon cancer Neg Hx        Developmental History:       Childhood: Denies Abuse  High School:Completed  College: denies    Mental Status Exam  Appearance  : groomed, good eye contact, normal street clothes  Behavior  : pleasant and cooperative  Motor  : No abnormal  Speech  : normal rhythm, rate, volume, tone, not hyperverbal, not pressured, normal prosidy  Mood  : \"I'm great\"  Affect  : euthymic, mood congruent, good variability  Thought Content  : negative suicidal ideations, negative homicidal ideations, negative obsessions  Perceptions  : negative auditory hallucinations, negative visual hallucinations  Thought Process  : not as tangential  Insight/Judgement  : Fair/fair  Cognition  : grossly intact  Attention   : intact      Review of Systems:  Review of Systems   Constitutional:  Negative for diaphoresis and fatigue.   HENT:  Negative for drooling.    Eyes:  Negative for visual disturbance.   Respiratory:  Negative for cough, chest tightness and shortness of breath.    Cardiovascular:  Negative for chest pain, palpitations and leg swelling. " "  Gastrointestinal:  Negative for abdominal pain, diarrhea, nausea and vomiting.   Endocrine: Negative for cold intolerance and heat intolerance.   Genitourinary:  Negative for difficulty urinating.   Musculoskeletal:  Negative for joint swelling.   Allergic/Immunologic: Negative for immunocompromised state.   Neurological:  Negative for dizziness, seizures, speech difficulty, light-headedness, numbness and headaches.   Psychiatric/Behavioral:  Negative for agitation and sleep disturbance.        Physical Exam:  Physical Exam    Vital Signs:   /77   Pulse 91   Ht 160 cm (63\")   Wt 64.4 kg (142 lb)   BMI 25.15 kg/m²      Lab Results:   Lab on 05/02/2024   Component Date Value Ref Range Status    Calcium 05/02/2024 9.5  8.6 - 10.5 mg/dL Final    PTH, Intact 05/02/2024 85.1 (H)  15.0 - 65.0 pg/mL Final    25 Hydroxy, Vitamin D 05/02/2024 36.3  30.0 - 100.0 ng/ml Final    Creatinine 05/02/2024 1.34 (H)  0.57 - 1.00 mg/dL Final    eGFR 05/02/2024 43.5 (L)  >60.0 mL/min/1.73 Final   Lab on 02/09/2024   Component Date Value Ref Range Status    Glucose 02/09/2024 80  65 - 99 mg/dL Final    BUN 02/09/2024 22  8 - 23 mg/dL Final    Creatinine 02/09/2024 1.08 (H)  0.57 - 1.00 mg/dL Final    Sodium 02/09/2024 138  136 - 145 mmol/L Final    Potassium 02/09/2024 4.6  3.5 - 5.2 mmol/L Final    Chloride 02/09/2024 103  98 - 107 mmol/L Final    CO2 02/09/2024 24.4  22.0 - 29.0 mmol/L Final    Calcium 02/09/2024 9.3  8.6 - 10.5 mg/dL Final    Total Protein 02/09/2024 6.9  6.0 - 8.5 g/dL Final    Albumin 02/09/2024 4.4  3.5 - 5.2 g/dL Final    ALT (SGPT) 02/09/2024 31  1 - 33 U/L Final    AST (SGOT) 02/09/2024 32  1 - 32 U/L Final    Alkaline Phosphatase 02/09/2024 96  39 - 117 U/L Final    Total Bilirubin 02/09/2024 0.3  0.0 - 1.2 mg/dL Final    Globulin 02/09/2024 2.5  gm/dL Final    A/G Ratio 02/09/2024 1.8  g/dL Final    BUN/Creatinine Ratio 02/09/2024 20.4  7.0 - 25.0 Final    Anion Gap 02/09/2024 10.6  5.0 - 15.0 " mmol/L Final    eGFR 02/09/2024 56.4 (L)  >60.0 mL/min/1.73 Final    Iron 02/09/2024 65  37 - 145 mcg/dL Final    Iron Saturation (TSAT) 02/09/2024 18 (L)  20 - 50 % Final    Transferrin 02/09/2024 244  200 - 360 mg/dL Final    TIBC 02/09/2024 364  298 - 536 mcg/dL Final    WBC 02/09/2024 5.94  3.40 - 10.80 10*3/mm3 Final    RBC 02/09/2024 3.38 (L)  3.77 - 5.28 10*6/mm3 Final    Hemoglobin 02/09/2024 10.7 (L)  12.0 - 15.9 g/dL Final    Hematocrit 02/09/2024 31.6 (L)  34.0 - 46.6 % Final    MCV 02/09/2024 93.5  79.0 - 97.0 fL Final    MCH 02/09/2024 31.7  26.6 - 33.0 pg Final    MCHC 02/09/2024 33.9  31.5 - 35.7 g/dL Final    RDW 02/09/2024 12.1 (L)  12.3 - 15.4 % Final    RDW-SD 02/09/2024 40.5  37.0 - 54.0 fl Final    MPV 02/09/2024 12.0  6.0 - 12.0 fL Final    Platelets 02/09/2024 197  140 - 450 10*3/mm3 Final    Neutrophil % 02/09/2024 57.4  42.7 - 76.0 % Final    Lymphocyte % 02/09/2024 27.4  19.6 - 45.3 % Final    Monocyte % 02/09/2024 10.3  5.0 - 12.0 % Final    Eosinophil % 02/09/2024 3.7  0.3 - 6.2 % Final    Basophil % 02/09/2024 1.0  0.0 - 1.5 % Final    Immature Grans % 02/09/2024 0.2  0.0 - 0.5 % Final    Neutrophils, Absolute 02/09/2024 3.41  1.70 - 7.00 10*3/mm3 Final    Lymphocytes, Absolute 02/09/2024 1.63  0.70 - 3.10 10*3/mm3 Final    Monocytes, Absolute 02/09/2024 0.61  0.10 - 0.90 10*3/mm3 Final    Eosinophils, Absolute 02/09/2024 0.22  0.00 - 0.40 10*3/mm3 Final    Basophils, Absolute 02/09/2024 0.06  0.00 - 0.20 10*3/mm3 Final    Immature Grans, Absolute 02/09/2024 0.01  0.00 - 0.05 10*3/mm3 Final    nRBC 02/09/2024 0.0  0.0 - 0.2 /100 WBC Final   Lab on 01/05/2024   Component Date Value Ref Range Status    Glucose 01/05/2024 82  65 - 99 mg/dL Final    BUN 01/05/2024 26 (H)  8 - 23 mg/dL Final    Creatinine 01/05/2024 1.21 (H)  0.57 - 1.00 mg/dL Final    Sodium 01/05/2024 137  136 - 145 mmol/L Final    Potassium 01/05/2024 4.6  3.5 - 5.2 mmol/L Final    Chloride 01/05/2024 104  98 - 107 mmol/L  Final    CO2 01/05/2024 23.0  22.0 - 29.0 mmol/L Final    Calcium 01/05/2024 9.5  8.6 - 10.5 mg/dL Final    Total Protein 01/05/2024 7.2  6.0 - 8.5 g/dL Final    Albumin 01/05/2024 4.5  3.5 - 5.2 g/dL Final    ALT (SGPT) 01/05/2024 27  1 - 33 U/L Final    AST (SGOT) 01/05/2024 27  1 - 32 U/L Final    Alkaline Phosphatase 01/05/2024 82  39 - 117 U/L Final    Total Bilirubin 01/05/2024 0.5  0.0 - 1.2 mg/dL Final    Globulin 01/05/2024 2.7  gm/dL Final    A/G Ratio 01/05/2024 1.7  g/dL Final    BUN/Creatinine Ratio 01/05/2024 21.5  7.0 - 25.0 Final    Anion Gap 01/05/2024 10.0  5.0 - 15.0 mmol/L Final    eGFR 01/05/2024 49.2 (L)  >60.0 mL/min/1.73 Final    TSH 01/05/2024 2.380  0.270 - 4.200 uIU/mL Final    Total Cholesterol 01/05/2024 214 (H)  0 - 200 mg/dL Final    Triglycerides 01/05/2024 58  0 - 150 mg/dL Final    HDL Cholesterol 01/05/2024 82 (H)  40 - 60 mg/dL Final    LDL Cholesterol  01/05/2024 122 (H)  0 - 100 mg/dL Final    VLDL Cholesterol 01/05/2024 10  5 - 40 mg/dL Final    LDL/HDL Ratio 01/05/2024 1.47   Final    Lithium 01/05/2024 0.8  0.6 - 1.2 mmol/L Final    WBC 01/05/2024 5.04  3.40 - 10.80 10*3/mm3 Final    RBC 01/05/2024 3.47 (L)  3.77 - 5.28 10*6/mm3 Final    Hemoglobin 01/05/2024 10.5 (L)  12.0 - 15.9 g/dL Final    Hematocrit 01/05/2024 32.1 (L)  34.0 - 46.6 % Final    MCV 01/05/2024 92.5  79.0 - 97.0 fL Final    MCH 01/05/2024 30.3  26.6 - 33.0 pg Final    MCHC 01/05/2024 32.7  31.5 - 35.7 g/dL Final    RDW 01/05/2024 12.0 (L)  12.3 - 15.4 % Final    RDW-SD 01/05/2024 39.8  37.0 - 54.0 fl Final    MPV 01/05/2024 12.0  6.0 - 12.0 fL Final    Platelets 01/05/2024 214  140 - 450 10*3/mm3 Final    Neutrophil % 01/05/2024 53.4  42.7 - 76.0 % Final    Lymphocyte % 01/05/2024 31.5  19.6 - 45.3 % Final    Monocyte % 01/05/2024 10.1  5.0 - 12.0 % Final    Eosinophil % 01/05/2024 3.6  0.3 - 6.2 % Final    Basophil % 01/05/2024 1.2  0.0 - 1.5 % Final    Immature Grans % 01/05/2024 0.2  0.0 - 0.5 % Final     "Neutrophils, Absolute 01/05/2024 2.69  1.70 - 7.00 10*3/mm3 Final    Lymphocytes, Absolute 01/05/2024 1.59  0.70 - 3.10 10*3/mm3 Final    Monocytes, Absolute 01/05/2024 0.51  0.10 - 0.90 10*3/mm3 Final    Eosinophils, Absolute 01/05/2024 0.18  0.00 - 0.40 10*3/mm3 Final    Basophils, Absolute 01/05/2024 0.06  0.00 - 0.20 10*3/mm3 Final    Immature Grans, Absolute 01/05/2024 0.01  0.00 - 0.05 10*3/mm3 Final    nRBC 01/05/2024 0.0  0.0 - 0.2 /100 WBC Final       EKG Results:  No orders to display       Imaging Results:  No Images in the past 120 days found..      Assessment & Plan   Diagnoses and all orders for this visit:    1. Major depressive disorder, recurrent episode, moderate (Primary)    2. Generalized anxiety disorder    3. Insomnia due to mental condition    4. Hypomanic bipolar II disorder with full remission        Visit Diagnoses:    ICD-10-CM ICD-9-CM   1. Major depressive disorder, recurrent episode, moderate  F33.1 296.32   2. Generalized anxiety disorder  F41.1 300.02   3. Insomnia due to mental condition  F51.05 300.9     327.02   4. Hypomanic bipolar II disorder with full remission  F31.81 296.89     6/25/24: Stable, well, no changes. 6m    12/29: Stable, well, watch hair loss (can occur in up  to 10% taking lithium), may need to start zinc/selenium supplements. 6m    6/29: stable, well. No changes. If elevated bps at home over the next 30 days, pt to call me and pcp. 6 mos    4/27: Stable, well. Not even taking clonazepam.     3/31: Much improved, \"best I've been in 5 years.\" No changes now, low threshold to increase lithium to 450 mg. May need neuro workup in the past due to clear bipolar II late-emerging hypomania.  Watch sleeping closely, may need to increase doxepin.     3/7: [place] today. Longer appnt. Seems more hypomanic now. Increase lithium, watch dry lip. SE with every med?     2/20: [place] at next visit, then neuropsych testing. Seems hypomanic, improved but not tolerating abilify. Stop " abilify and start lithium qhs.     1/17: Increase abilify, a bit more linear. Answered questions more accurately. Appears bipolar hypomanic.     12/22: Very talkative and tangential, hard to get anything out of the patient.  Trouble with answering questions, avoids saying yes or no to yes or no questions.  Start Abilify and see back in 4 weeks.  [facility]  Unclear what the diagnosis is but it could be bipolar.    PLAN:  Safety: No acute safety concerns  Therapy: None  Risk Assessment: Risk of self-harm acutely is moderate.  Risk factors include anxiety disorder, mood disorder, access to weapons and recent psychosocial stressors (pandemic). Protective factors include no family history, no present SI, no history of suicide attempts or self-harm in the past, minimal AODA, healthcare seeking, future orientation, willingness to engage in care.  Risk of self-harm chronically is also moderate, but could be further elevated in the event of treatment noncompliance and/or AODA.  Meds:    CONTINUE lithium 300 mg nightly. Risks, benefits, alternatives discussed with patient including ataxia, sedation, dizziness/falls risk, dysarthria, tremor, possible nephrogenic diabetes insipidus, diarrhea, nausea, weight gain, rash, and possible hypothyroid goiter.  Patient also counseled to monitor hydration status and sodium intake as changes can result in rapidly increasing, even toxic, levels of lithium. Patient also counseled regarding the use of diuretics and their ability to increase lithium levels. After discussion of these risks and benefits, the patient voiced understanding and agreed to proceed.  CONTINUE Wellbutrin  mg bid. Risks, benefits, alternatives discussed with patient including nausea, GI upset, increased energy, exacerbation of irritability, insomnia, lowering of seizure threshold.  After discussion of these risks and benefits, the patient voiced understanding and agreed to proceed.  STOPPED  clonazepam 1 mg tid prn. Risks, benefits, and alternatives discussed with patient including sedation/falls risk, dizziness, disinhibition, headache, fatigue, dry mouth, blurred vision, constipation, nausea, diarrhea, heartburn, unusual taste in mouth, urinary retention, increased appetite, restlessness, sexual dysfunction, sweating, weight gain, cardiac arrhythmias, seizures, and fall risk.  After discussion of these risks and benefits, patient voiced understanding and agreed to proceed.  [place] reviewed, UDS ordered, and controlled substance agreement signed & witnessed.  CONTINUE  Doxepin 25 ng nightly. Risks, benefits, alternatives discussed with patient including dizziness/falls risk, sedation, GI upset, constipation, possible blurred vision.  After discussion of these risks and benefits, the patient voiced understanding and agreed to proceed.  S/P:  Abilify 2 to 5 mg nightly. Akathisia.   Labs: None    Patient screened positive for depression based on a PHQ-9 score of  on . Follow-up recommendations include: Prescribed antidepressant medication treatment and Suicide Risk Assessment performed.           TREATMENT PLAN/GOALS: Continue supportive psychotherapy efforts and medications as indicated. Treatment and medication options discussed during today's visit. Patient acknowledged and verbally consented to continue with current treatment plan and was educated on the importance of compliance with treatment and follow-up appointments.    MEDICATION ISSUES:  BRYNN reviewed as expected.  Discussed medication options and treatment plan of prescribed medication as well as the risks, benefits, and side effects including potential falls, possible impaired driving and metabolic adversities among others. Patient is agreeable to call the office with any worsening of symptoms or onset of side effects. Patient is agreeable to call 911 or go to the nearest ER should he/she begin having SI/HI. No medication side effects or  related complaints today.     MEDS ORDERED DURING VISIT:  No orders of the defined types were placed in this encounter.      Return in about 6 months (around 12/25/2024).         This document has been electronically signed by Kai Elliott MD  June 25, 2024 14:47 EDT    Dictated Utilizing Dragon Dictation: Part of this note may be an electronic transcription/translation of spoken language to printed text using the Dragon Dictation System.

## 2024-07-01 ENCOUNTER — TRANSCRIBE ORDERS (OUTPATIENT)
Dept: ADMINISTRATIVE | Facility: HOSPITAL | Age: 69
End: 2024-07-01
Payer: MEDICARE

## 2024-07-01 DIAGNOSIS — M81.0 SENILE OSTEOPOROSIS: Primary | ICD-10-CM

## 2024-07-03 DIAGNOSIS — M81.0 POSTMENOPAUSAL OSTEOPOROSIS: Primary | ICD-10-CM

## 2024-07-08 ENCOUNTER — HOSPITAL ENCOUNTER (OUTPATIENT)
Dept: INFUSION THERAPY | Facility: HOSPITAL | Age: 69
Discharge: HOME | End: 2024-07-08
Payer: MEDICARE

## 2024-07-08 VITALS
HEART RATE: 79 BPM | DIASTOLIC BLOOD PRESSURE: 78 MMHG | OXYGEN SATURATION: 100 % | BODY MASS INDEX: 25.51 KG/M2 | SYSTOLIC BLOOD PRESSURE: 174 MMHG | RESPIRATION RATE: 20 BRPM | WEIGHT: 143.96 LBS | TEMPERATURE: 98.7 F | HEIGHT: 63 IN

## 2024-07-08 DIAGNOSIS — M81.0 POSTMENOPAUSAL OSTEOPOROSIS: Primary | ICD-10-CM

## 2024-07-08 LAB
CALCIUM SPEC-SCNC: 9.1 MG/DL (ref 8.6–10.5)
CREAT SERPL-MCNC: 1.15 MG/DL (ref 0.57–1)
EGFRCR SERPLBLD CKD-EPI 2021: 52.3 ML/MIN/1.73

## 2024-07-08 PROCEDURE — 25010000002 DENOSUMAB 60 MG/ML SOLUTION PREFILLED SYRINGE

## 2024-07-08 PROCEDURE — 96372 THER/PROPH/DIAG INJ SC/IM: CPT

## 2024-07-08 PROCEDURE — 82565 ASSAY OF CREATININE: CPT

## 2024-07-08 PROCEDURE — 82310 ASSAY OF CALCIUM: CPT

## 2024-07-08 RX ADMIN — DENOSUMAB 60 MG: 60 INJECTION SUBCUTANEOUS at 13:55

## 2024-08-14 DIAGNOSIS — F51.05 INSOMNIA DUE TO MENTAL CONDITION: ICD-10-CM

## 2024-08-14 DIAGNOSIS — F33.1 MAJOR DEPRESSIVE DISORDER, RECURRENT EPISODE, MODERATE: ICD-10-CM

## 2024-08-15 RX ORDER — BUPROPION HYDROCHLORIDE 100 MG/1
TABLET, EXTENDED RELEASE ORAL
Qty: 180 TABLET | Refills: 3 | Status: SHIPPED | OUTPATIENT
Start: 2024-08-15

## 2024-08-15 RX ORDER — DOXEPIN HYDROCHLORIDE 25 MG/1
CAPSULE ORAL
Qty: 90 CAPSULE | Refills: 3 | Status: SHIPPED | OUTPATIENT
Start: 2024-08-15

## 2024-08-15 NOTE — TELEPHONE ENCOUNTER
BUPROPION 100MG SR     Last ordered: 6 months ago (2/5/2024) by Kai Elliott MD     DOXEPIN HCL 25MG   Last ordered: 6 months ago (2/5/2024) by Kai Elliott MD     Follow up 12/17/2024

## 2024-11-22 ENCOUNTER — TELEPHONE (OUTPATIENT)
Dept: FAMILY MEDICINE CLINIC | Facility: CLINIC | Age: 69
End: 2024-11-22
Payer: MEDICARE

## 2024-11-22 DIAGNOSIS — N18.31 STAGE 3A CHRONIC KIDNEY DISEASE (CKD): Primary | ICD-10-CM

## 2024-11-22 DIAGNOSIS — F41.8 DEPRESSION WITH ANXIETY: ICD-10-CM

## 2024-11-22 DIAGNOSIS — D50.9 IRON DEFICIENCY ANEMIA, UNSPECIFIED IRON DEFICIENCY ANEMIA TYPE: ICD-10-CM

## 2024-11-22 DIAGNOSIS — D63.1 ANEMIA DUE TO STAGE 3A CHRONIC KIDNEY DISEASE: ICD-10-CM

## 2024-11-22 DIAGNOSIS — N18.31 ANEMIA DUE TO STAGE 3A CHRONIC KIDNEY DISEASE: ICD-10-CM

## 2024-11-22 NOTE — TELEPHONE ENCOUNTER
Caller: Alissa Bruce    Relationship: Self    Best call back number: [phone]     What orders are you requesting (i.e. lab or imaging): LABS (PROVIDER RECOMMENDATION)  **PATIENT IS ALSO DUE FOR A PROLIA SHOT IN JANUARY AND WHATEVER LABS ARE NEEDED FOR THAT WILL NEED TO BE ORDERED.**    DR. WHALEY WILL SCHEDULE PROLIA PER PATIENT.    In what timeframe would the patient need to come in: PRIOR TO 1.22.2025 APPOINTMENT    Where will you receive your lab/imaging services:  FACILITY    Additional notes: PATIENT WOULD LIKE TO HAVE LABS DRAWN PRIOR TO 1.22.2025 APPOINTMENT SO RESULTS CAN BE DISCUSSED AT VISIT.    PLEASE MAKE SURE INSURANCE WILL COVER LAB WORK THAT IS ORDERED.

## 2024-11-22 NOTE — TELEPHONE ENCOUNTER
Pt is asking lab orders be put in for her so she can have labs done prior to her January appt so yall can go over the results at the appt

## 2024-12-30 ENCOUNTER — OFFICE VISIT (OUTPATIENT)
Dept: PSYCHIATRY | Facility: CLINIC | Age: 69
End: 2024-12-30
Payer: MEDICARE

## 2024-12-30 VITALS
WEIGHT: 148 LBS | SYSTOLIC BLOOD PRESSURE: 150 MMHG | OXYGEN SATURATION: 100 % | DIASTOLIC BLOOD PRESSURE: 76 MMHG | BODY MASS INDEX: 26.22 KG/M2 | HEIGHT: 63 IN | HEART RATE: 78 BPM

## 2024-12-30 DIAGNOSIS — F51.05 INSOMNIA DUE TO MENTAL CONDITION: Primary | ICD-10-CM

## 2024-12-30 DIAGNOSIS — F41.1 GENERALIZED ANXIETY DISORDER: ICD-10-CM

## 2024-12-30 DIAGNOSIS — F31.81 HYPOMANIC BIPOLAR II DISORDER WITH FULL REMISSION: ICD-10-CM

## 2024-12-30 NOTE — PROGRESS NOTES
"Subjective   Alissa Bruce is a 69 y.o. female who presents today for initial evaluation     Referring Provider:  No referring provider defined for this encounter.    Chief Complaint: Anxiety    History of Present Illness:     22: Initial Chart review: Patient has a history of anxiety for several years, followed by [facility] during this time for medication management with little success.  Sent to us for a second opinion.  On Wellbutrin  twice daily, doxepin 25 nightly, Klonopin 1 mg p.o. 3 times daily as needed, but she takes about 1-1/2 tabs daily.  Nothing in PDMP.  No Care Everywhere.  Older labs from 2021 show reassuring CMP, thyroid studies, folate, iron studies, elevated lipids, reassuring B12, vitamin D, CBC.  No head imaging, EKG.    Psychotropic medication review includes Ativan, Vraylar, mirtazapine, Seroquel XR, Celexa, Cymbalta, Latuda, Adderall XR, Rexulti, Prozac, Abilify, Lamictal, BuSpar, Zoloft, Lexapro, Effexor.  There may have been others prior to [facility] management.    Chart review:   2024: infusion; cr 1.15, Li 0.8 at 9 am (takes it at night).  24: multiple visits. Cr 1.34 (persistently high since ), benign mammogram.  no new.    Plannin24: Stable, well, no changes. 6m  : Stable, well, watch hair loss (can occur in up  to 10% taking lithium), may need to start zinc/selenium supplements. 6m  : stable, well. No changes. If elevated bps at home over the next 30 days, pt to call me and pcp. 6 mos.       Visits (Below):  \"Alissa\" and \"Feng\"   Most concerning symptom: anxiety  Consider auvelity, MAOIs  Get back to gardening      2024:   In person interview:  \"Everything's been good.\"  No complaints  Can I wean off bupropion?  Mood/Depression: stable, denies hypomanic s/sx  Anxiety: stable  Panic attacks: n  Energy: stable  Concentration: stable  Sleeping: stable  Eatin, 142, 140 lbs  Refills: y  Substances: denies  Therapy: none  Medication " "compliant: yes  SE: n  No SI HI AVH.      2024:   In person interview:  \"I've been great.\"  No complaints  Mood/Depression: stable, denies hypomanic s/sx  Anxiety: stable  Panic attacks: n  Energy: stable  Concentration: stable  Sleeping: stable  Eatin, 140 lbs  Refills: y  Substances: def  Therapy: n  Medication compliant: y  SE: n  No SI HI AVH.      : In person interview:  \"I'm doing well, no complaints.\"  Elevated Bps. If stays up over a month, call pcp and me with an update.  Having some hair loss  Mood/Depression: stable, denies hypomanic s/sx  Anxiety: stable  Panic attacks: n  Energy: stable  Concentration: stable  Sleeping: stable  Eatin lbs  Refills: y  Substances: def  Therapy: n  Medication compliant: y  SE: n  No SI HI AVH.      : In person interview:  Chart review: no new.  Planning: stable, well.  \"I'm doing well, no complaints.\"  Elevated Bps. If stays up over a month, call pcp and me with an update.  Mood/Depression: doing well  Anxiety: doing well  Panic attacks: n  Energy: good  Concentration: good  Sleeping: well  Eating: stable, 5 lb wl 3/23  Refills: y  Substances: def  Therapy: n  Medication compliant: y  SE: n  No SI HI AVH.    ...      : In person.  Interview:  Chart review:   Would rule out ADHD.  Would rule out PTSD, consider guanfacine, clonidine, prazosin.  His/Her Story: \"I was working with Delfina Quevedo.\"  P15, G14  Had been working with Kamilah Dee also, and Dr. Wallace  I never even saw Delfina -- maybe 2 years  Talks over me, talkative, but interruptible, long silences too  One day I woke up and \"something wasn't right\" 5 years ago  Didn't like the ativan  6 mos I was perfectly fine: 2018 ( to )?  2017, multiple losses, when it all began  Discussed how klonopin is addictive  Everything that I do \"excites me\"  Most of the day I watch TV  Depression/Mood:  Depressed mood, anhedonia, hopelessness, poor energy, poor concentration, weight loss or " weight gain, psychomotor retardation or agitation, insomnia controlled on clonazepam.  Seasonal pattern:  Severity: Moderate  Duration: 5 years ago  Anxiety:  Uncontrolled worrying, muscle tension, fatigue, poor concentration, feeling on edge, irritability, insomnia.  Severity: Moderate to severe  Duration: 5 years ago  Panic attacks: n  Psych ROS: Positive for depression, anxiety.  Negative for psychosis and williams.  ADHD: Deferred  PTSD: Deferred  No SI HI AVH.  Substances: No  Therapy: Briefly  Medication compliant: y    Access to Firearms: Yes, locked away    PHQ-9 Depression Screening  PHQ-9 Total Score:      Little interest or pleasure in doing things?     Feeling down, depressed, or hopeless?     Trouble falling or staying asleep, or sleeping too much?     Feeling tired or having little energy?     Poor appetite or overeating?     Feeling bad about yourself - or that you are a failure or have let yourself or your family down?     Trouble concentrating on things, such as reading the newspaper or watching television?     Moving or speaking so slowly that other people could have noticed? Or the opposite - being so fidgety or restless that you have been moving around a lot more than usual?     Thoughts that you would be better off dead, or of hurting yourself in some way?     PHQ-9 Total Score       VITOR-7       Past Surgical History:  Past Surgical History:   Procedure Laterality Date    COLONOSCOPY  Age 52    CYST REMOVAL Right 1977    wrist    EYE SURGERY  2004    Lasik to correct vision    TUBAL ABDOMINAL LIGATION  [id number]       Problem List:  Patient Active Problem List   Diagnosis    Depression with anxiety    Right hip pain    Encounter for annual wellness exam in Medicare patient    Trochanteric bursitis of right hip    Elevated systolic blood pressure reading with diagnosis of hypertension    Anemia due to stage 3a chronic kidney disease    Postmenopausal osteoporosis       Allergy:   Allergies   Allergen  Reactions    Sulfa Antibiotics Unknown - Low Severity and Other (See Comments)        Discontinued Medications:  There are no discontinued medications.      Current Medications:   Current Outpatient Medications   Medication Sig Dispense Refill    buPROPion SR (WELLBUTRIN SR) 100 MG 12 hr tablet TAKE 1 TABLET BY MOUTH TWICE DAILY 180 tablet 3    doxepin (SINEquan) 25 MG capsule TAKE 1 CAPSULE BY MOUTH AT BEDTIME 90 capsule 3    lithium carbonate 150 MG capsule TAKE 2 CAPSULES BY MOUTH AT BEDTIME 180 capsule 3    Omega-3 Fatty Acids (Fish Oil) 1000 MG capsule delayed-release Take  by mouth.      polyethylene glycol (MIRALAX) 17 g packet Take 17 g by mouth Daily.      Teriparatide, Recombinant, (FORTEO) 600 MCG/2.4ML injection Inject 0.08 mL under the skin into the appropriate area as directed Daily. (Patient not taking: Reported on 2024)       No current facility-administered medications for this visit.       Past Medical History:  Past Medical History:   Diagnosis Date    Anxiety     Depression     Elevated systolic blood pressure reading with diagnosis of hypertension 3/7/2024    Osteopenia     Visual impairment     Dry eyes       Past Psychiatric History:  Began Treatment: a few years ago  Diagnoses: dep and anx, bipolar 2  Psychiatrist: [facility]  Therapist: yes briefly  Admission History:Denies  Medication Trials:    Multiple, can't remember inidivdual effects    Only one that helped me get back to my old self was zoloft during those six months    Self Harm: Denies  Suicide Attempts:Denies   Psychosis, Anxiety, Depression: Denies    Substance Abuse History:   Types:Denies all, including illicit  Withdrawal Symptoms:Denies  Longest Period Sober:Not Applicable   AA: Not applicable     Social History:  Martial Status:  Employed:No  Kids:Yes  House:Lives in a house   History: Denies    Social History     Socioeconomic History    Marital status:    Tobacco Use    Smoking status: Never     " Passive exposure: Past    Smokeless tobacco: Never   Vaping Use    Vaping status: Never Used   Substance and Sexual Activity    Alcohol use: Never    Drug use: Never    Sexual activity: Not Currently     Partners: Male     Birth control/protection: None, Tubal ligation, Surgical     Comment: Cyst removal right wrist       Family History:   Suicide Attempts: Denies  Suicide Completions:Denies      Family History   Problem Relation Age of Onset    No Known Problems Father     Hyperlipidemia Mother     Arthritis Brother     Bipolar disorder Sister         [id number] ()    Mental illness Sister     Depression Sister             No Known Problems Paternal Grandfather     No Known Problems Paternal Grandmother     No Known Problems Maternal Grandmother     No Known Problems Maternal Grandfather     No Known Problems Maternal Aunt     No Known Problems Maternal Uncle     No Known Problems Paternal Aunt     No Known Problems Paternal Uncle     No Known Problems Cousin     ADD / ADHD Neg Hx     Alcohol abuse Neg Hx     Anxiety disorder Neg Hx     Dementia Neg Hx     Drug abuse Neg Hx     OCD Neg Hx     Paranoid behavior Neg Hx     Schizophrenia Neg Hx     Seizures Neg Hx     Self-Injurious Behavior  Neg Hx     Suicide Attempts Neg Hx     Breast cancer Neg Hx     Uterine cancer Neg Hx     Ovarian cancer Neg Hx     Colon cancer Neg Hx        Developmental History:       Childhood: Denies Abuse  High School:Completed  College: denies    Mental Status Exam  Appearance  : groomed, good eye contact, normal street clothes  Behavior  : pleasant and cooperative  Motor  : No abnormal  Speech  : normal rhythm, rate, volume, tone, not hyperverbal, not pressured, normal prosidy  Mood  : \"I'm great\"  Affect  : euthymic, mood congruent, good variability  Thought Content  : negative suicidal ideations, negative homicidal ideations, negative obsessions  Perceptions  : negative auditory hallucinations, negative visual " "hallucinations  Thought Process  : not as tangential  Insight/Judgement  : Fair/fair  Cognition  : grossly intact  Attention   : intact      Review of Systems:  Review of Systems   Constitutional:  Negative for chills, diaphoresis, fatigue and fever.   HENT:  Negative for congestion, drooling and sore throat.    Eyes:  Negative for visual disturbance.   Respiratory:  Negative for cough, chest tightness and shortness of breath.    Cardiovascular:  Negative for chest pain, palpitations and leg swelling.   Gastrointestinal:  Negative for abdominal pain, diarrhea, nausea and vomiting.   Endocrine: Negative for cold intolerance and heat intolerance.   Genitourinary:  Negative for difficulty urinating and dysuria.   Musculoskeletal:  Negative for joint swelling, myalgias and neck pain.   Skin:  Negative for rash.   Allergic/Immunologic: Negative for immunocompromised state.   Neurological:  Negative for dizziness, seizures, speech difficulty, weakness, light-headedness, numbness and headaches.   Psychiatric/Behavioral:  Negative for agitation and sleep disturbance.        Physical Exam:  Physical Exam    Vital Signs:   /76   Pulse 78   Ht 160 cm (63\")   Wt 67.1 kg (148 lb)   SpO2 100%   BMI 26.22 kg/m²      Lab Results:   Hospital Outpatient Visit on 07/08/2024   Component Date Value Ref Range Status    Calcium 07/08/2024 9.1  8.6 - 10.5 mg/dL Final    Creatinine 07/08/2024 1.15 (H)  0.57 - 1.00 mg/dL Final    eGFR 07/08/2024 52.3 (L)  >60.0 mL/min/1.73 Final       EKG Results:  No orders to display       Imaging Results:  No Images in the past 120 days found..      Assessment & Plan   Diagnoses and all orders for this visit:    1. 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 Generalized anxiety disorder    3. Hypomanic bipolar II disorder with full remission        Visit Diagnoses:    ICD-10-CM ICD-9-CM   1. Insomnia due to mental condition  F51.05 300.9     327.02   2. Generalized anxiety disorder  F41.1 " "300.02   3. Hypomanic bipolar II disorder with full remission  F31.81 296.89     12/30/2024: Stable, well, no med changes. Reduce wellbutrin to once a day. 6w    6/25/24: Stable, well, no changes. 6m    12/29: Stable, well, watch hair loss (can occur in up  to 10% taking lithium), may need to start zinc/selenium supplements. 6m    6/29: stable, well. No changes. If elevated bps at home over the next 30 days, pt to call me and pcp. 6 mos    4/27: Stable, well. Not even taking clonazepam.     3/31: Much improved, \"best I've been in 5 years.\" No changes now, low threshold to increase lithium to 450 mg. May need neuro workup in the past due to clear bipolar II late-emerging hypomania.  Watch sleeping closely, may need to increase doxepin.     3/7: [place] today. Longer appnt. Seems more hypomanic now. Increase lithium, watch dry lip. SE with every med?     2/20: [place] at next visit, then neuropsych testing. Seems hypomanic, improved but not tolerating abilify. Stop abilify and start lithium qhs.     1/17: Increase abilify, a bit more linear. Answered questions more accurately. Appears bipolar hypomanic.     12/22: Very talkative and tangential, hard to get anything out of the patient.  Trouble with answering questions, avoids saying yes or no to yes or no questions.  Start Abilify and see back in 4 weeks.  Request records from [facility].  Unclear what the diagnosis is but it could be bipolar.    PLAN:  Safety: No acute safety concerns  Therapy: None  Risk Assessment: Risk of self-harm acutely is moderate.  Risk factors include anxiety disorder, mood disorder, access to weapons and recent psychosocial stressors (pandemic). Protective factors include no family history, no present SI, no history of suicide attempts or self-harm in the past, minimal AODA, healthcare seeking, future orientation, willingness to engage in care.  Risk of self-harm chronically is also moderate, but could be further elevated in the event of treatment " noncompliance and/or AODA.  Meds:  CONTINUE lithium 300 mg nightly. Risks, benefits, alternatives discussed with patient including ataxia, sedation, dizziness/falls risk, dysarthria, tremor, possible nephrogenic diabetes insipidus, diarrhea, nausea, weight gain, rash, and possible hypothyroid goiter.  Patient also counseled to monitor hydration status and sodium intake as changes can result in rapidly increasing, even toxic, levels of lithium. Patient also counseled regarding the use of diuretics and their ability to increase lithium levels. After discussion of these risks and benefits, the patient voiced understanding and agreed to proceed.  REDUCE Wellbutrin  mg bid to QDAY. Risks, benefits, alternatives discussed with patient including nausea, GI upset, increased energy, exacerbation of irritability, insomnia, lowering of seizure threshold.  After discussion of these risks and benefits, the patient voiced understanding and agreed to proceed.  STOPPED clonazepam 1 mg tid prn. Risks, benefits, and alternatives discussed with patient including sedation/falls risk, dizziness, disinhibition, headache, fatigue, dry mouth, blurred vision, constipation, nausea, diarrhea, heartburn, unusual taste in mouth, urinary retention, increased appetite, restlessness, sexual dysfunction, sweating, weight gain, cardiac arrhythmias, seizures, and fall risk.  After discussion of these risks and benefits, patient voiced understanding and agreed to proceed.  Reymundo reviewed, UDS ordered, and controlled substance agreement signed & witnessed.  CONTINUE  Doxepin 25 ng nightly. Risks, benefits, alternatives discussed with patient including dizziness/falls risk, sedation, GI upset, constipation, possible blurred vision.  After discussion of these risks and benefits, the patient voiced understanding and agreed to proceed.  S/P:  Abilify 2 to 5 mg nightly. Akathisia.   Labs: None    Patient screened positive for depression based on a  PHQ-9 score of  on . Follow-up recommendations include: Prescribed antidepressant medication treatment and Suicide Risk Assessment performed.           TREATMENT PLAN/GOALS: Continue supportive psychotherapy efforts and medications as indicated. Treatment and medication options discussed during today's visit. Patient acknowledged and verbally consented to continue with current treatment plan and was educated on the importance of compliance with treatment and follow-up appointments.    MEDICATION ISSUES:  BRYNN reviewed as expected.  Discussed medication options and treatment plan of prescribed medication as well as the risks, benefits, and side effects including potential falls, possible impaired driving and metabolic adversities among others. Patient is agreeable to call the office with any worsening of symptoms or onset of side effects. Patient is agreeable to call 911 or go to the nearest ER should he/she begin having SI/HI. No medication side effects or related complaints today.     MEDS ORDERED DURING VISIT:  No orders of the defined types were placed in this encounter.      Return in about 6 weeks (around 2/10/2025).         This document has been electronically signed by Kai Elliott MD  December 30, 2024 12:41 EST    Dictated Utilizing Dragon Dictation: Part of this note may be an electronic transcription/translation of spoken language to printed text using the Dragon Dictation System.

## 2025-01-14 ENCOUNTER — TELEPHONE (OUTPATIENT)
Dept: FAMILY MEDICINE CLINIC | Facility: CLINIC | Age: 70
End: 2025-01-14
Payer: MEDICARE

## 2025-01-14 DIAGNOSIS — Z11.59 NEED FOR HEPATITIS C SCREENING TEST: ICD-10-CM

## 2025-01-14 DIAGNOSIS — M81.0 POSTMENOPAUSAL OSTEOPOROSIS: Primary | ICD-10-CM

## 2025-01-15 ENCOUNTER — LAB (OUTPATIENT)
Dept: LAB | Facility: HOSPITAL | Age: 70
End: 2025-01-15
Payer: MEDICARE

## 2025-01-15 DIAGNOSIS — F41.8 DEPRESSION WITH ANXIETY: ICD-10-CM

## 2025-01-15 DIAGNOSIS — D50.9 IRON DEFICIENCY ANEMIA, UNSPECIFIED IRON DEFICIENCY ANEMIA TYPE: ICD-10-CM

## 2025-01-15 DIAGNOSIS — M81.0 POSTMENOPAUSAL OSTEOPOROSIS: ICD-10-CM

## 2025-01-15 DIAGNOSIS — Z11.59 NEED FOR HEPATITIS C SCREENING TEST: ICD-10-CM

## 2025-01-15 DIAGNOSIS — N18.31 ANEMIA DUE TO STAGE 3A CHRONIC KIDNEY DISEASE: ICD-10-CM

## 2025-01-15 DIAGNOSIS — D63.1 ANEMIA DUE TO STAGE 3A CHRONIC KIDNEY DISEASE: ICD-10-CM

## 2025-01-15 DIAGNOSIS — N18.31 STAGE 3A CHRONIC KIDNEY DISEASE (CKD): ICD-10-CM

## 2025-01-15 LAB
25(OH)D3 SERPL-MCNC: 30.6 NG/ML (ref 30–100)
ALBUMIN SERPL-MCNC: 4.2 G/DL (ref 3.5–5.2)
ALBUMIN/GLOB SERPL: 1.4 G/DL
ALP SERPL-CCNC: 71 U/L (ref 39–117)
ALT SERPL W P-5'-P-CCNC: 27 U/L (ref 1–33)
ANION GAP SERPL CALCULATED.3IONS-SCNC: 9.8 MMOL/L (ref 5–15)
AST SERPL-CCNC: 39 U/L (ref 1–32)
BILIRUB SERPL-MCNC: 0.3 MG/DL (ref 0–1.2)
BUN SERPL-MCNC: 39 MG/DL (ref 8–23)
BUN/CREAT SERPL: 30.7 (ref 7–25)
CALCIUM SPEC-SCNC: 9.8 MG/DL (ref 8.6–10.5)
CHLORIDE SERPL-SCNC: 106 MMOL/L (ref 98–107)
CHOLEST SERPL-MCNC: 219 MG/DL (ref 0–200)
CO2 SERPL-SCNC: 24.2 MMOL/L (ref 22–29)
CREAT SERPL-MCNC: 1.27 MG/DL (ref 0.57–1)
DEPRECATED RDW RBC AUTO: 42.6 FL (ref 37–54)
EGFRCR SERPLBLD CKD-EPI 2021: 46.2 ML/MIN/1.73
ERYTHROCYTE [DISTWIDTH] IN BLOOD BY AUTOMATED COUNT: 12.2 % (ref 12.3–15.4)
FERRITIN SERPL-MCNC: 101 NG/ML (ref 13–150)
FOLATE SERPL-MCNC: 7.2 NG/ML (ref 4.78–24.2)
GLOBULIN UR ELPH-MCNC: 3 GM/DL
GLUCOSE SERPL-MCNC: 91 MG/DL (ref 65–99)
HCT VFR BLD AUTO: 35.9 % (ref 34–46.6)
HCV AB SER QL: NORMAL
HDLC SERPL-MCNC: 81 MG/DL (ref 40–60)
HGB BLD-MCNC: 11.3 G/DL (ref 12–15.9)
IRON 24H UR-MRATE: 102 MCG/DL (ref 37–145)
IRON SATN MFR SERPL: 26 % (ref 20–50)
LDLC SERPL CALC-MCNC: 127 MG/DL (ref 0–100)
LDLC/HDLC SERPL: 1.55 {RATIO}
LITHIUM SERPL-SCNC: 0.6 MMOL/L (ref 0.6–1.2)
MCH RBC QN AUTO: 29.9 PG (ref 26.6–33)
MCHC RBC AUTO-ENTMCNC: 31.5 G/DL (ref 31.5–35.7)
MCV RBC AUTO: 95 FL (ref 79–97)
PLATELET # BLD AUTO: 190 10*3/MM3 (ref 140–450)
PMV BLD AUTO: 13 FL (ref 6–12)
POTASSIUM SERPL-SCNC: 4.5 MMOL/L (ref 3.5–5.2)
PROT SERPL-MCNC: 7.2 G/DL (ref 6–8.5)
RBC # BLD AUTO: 3.78 10*6/MM3 (ref 3.77–5.28)
SODIUM SERPL-SCNC: 140 MMOL/L (ref 136–145)
T4 FREE SERPL-MCNC: 1.07 NG/DL (ref 0.92–1.68)
TIBC SERPL-MCNC: 386 MCG/DL (ref 298–536)
TRANSFERRIN SERPL-MCNC: 259 MG/DL (ref 200–360)
TRIGL SERPL-MCNC: 64 MG/DL (ref 0–150)
TSH SERPL DL<=0.05 MIU/L-ACNC: 2.37 UIU/ML (ref 0.27–4.2)
VIT B12 BLD-MCNC: 483 PG/ML (ref 211–946)
VLDLC SERPL-MCNC: 11 MG/DL (ref 5–40)
WBC NRBC COR # BLD AUTO: 6.1 10*3/MM3 (ref 3.4–10.8)

## 2025-01-15 PROCEDURE — 82306 VITAMIN D 25 HYDROXY: CPT

## 2025-01-15 PROCEDURE — 84466 ASSAY OF TRANSFERRIN: CPT

## 2025-01-15 PROCEDURE — 80061 LIPID PANEL: CPT

## 2025-01-15 PROCEDURE — 80178 ASSAY OF LITHIUM: CPT

## 2025-01-15 PROCEDURE — 36415 COLL VENOUS BLD VENIPUNCTURE: CPT

## 2025-01-15 PROCEDURE — 80053 COMPREHEN METABOLIC PANEL: CPT

## 2025-01-15 PROCEDURE — 82746 ASSAY OF FOLIC ACID SERUM: CPT

## 2025-01-15 PROCEDURE — 82728 ASSAY OF FERRITIN: CPT

## 2025-01-15 PROCEDURE — 82607 VITAMIN B-12: CPT

## 2025-01-15 PROCEDURE — 86803 HEPATITIS C AB TEST: CPT

## 2025-01-15 PROCEDURE — 84443 ASSAY THYROID STIM HORMONE: CPT

## 2025-01-15 PROCEDURE — 83540 ASSAY OF IRON: CPT

## 2025-01-15 PROCEDURE — 85027 COMPLETE CBC AUTOMATED: CPT

## 2025-01-15 PROCEDURE — 84439 ASSAY OF FREE THYROXINE: CPT

## 2025-01-22 ENCOUNTER — OFFICE VISIT (OUTPATIENT)
Dept: FAMILY MEDICINE CLINIC | Facility: CLINIC | Age: 70
End: 2025-01-22
Payer: MEDICARE

## 2025-01-22 VITALS
RESPIRATION RATE: 16 BRPM | TEMPERATURE: 97.9 F | SYSTOLIC BLOOD PRESSURE: 145 MMHG | WEIGHT: 151.4 LBS | DIASTOLIC BLOOD PRESSURE: 74 MMHG | OXYGEN SATURATION: 100 % | HEART RATE: 83 BPM | BODY MASS INDEX: 26.82 KG/M2 | HEIGHT: 63 IN

## 2025-01-22 DIAGNOSIS — M81.6 LOCALIZED OSTEOPOROSIS WITHOUT CURRENT PATHOLOGICAL FRACTURE: Chronic | ICD-10-CM

## 2025-01-22 DIAGNOSIS — N18.31 STAGE 3A CHRONIC KIDNEY DISEASE (CKD): Chronic | ICD-10-CM

## 2025-01-22 DIAGNOSIS — D63.1 ANEMIA DUE TO STAGE 3A CHRONIC KIDNEY DISEASE: Chronic | ICD-10-CM

## 2025-01-22 DIAGNOSIS — Z12.11 SCREENING FOR COLON CANCER: ICD-10-CM

## 2025-01-22 DIAGNOSIS — M70.61 TROCHANTERIC BURSITIS OF RIGHT HIP: Chronic | ICD-10-CM

## 2025-01-22 DIAGNOSIS — I10 ELEVATED SYSTOLIC BLOOD PRESSURE READING WITH DIAGNOSIS OF HYPERTENSION: Chronic | ICD-10-CM

## 2025-01-22 DIAGNOSIS — Z00.00 ENCOUNTER FOR SUBSEQUENT ANNUAL WELLNESS VISIT (AWV) IN MEDICARE PATIENT: Primary | ICD-10-CM

## 2025-01-22 DIAGNOSIS — N18.31 ANEMIA DUE TO STAGE 3A CHRONIC KIDNEY DISEASE: Chronic | ICD-10-CM

## 2025-01-22 PROCEDURE — 3078F DIAST BP <80 MM HG: CPT | Performed by: FAMILY MEDICINE

## 2025-01-22 PROCEDURE — 1159F MED LIST DOCD IN RCRD: CPT | Performed by: FAMILY MEDICINE

## 2025-01-22 PROCEDURE — G0439 PPPS, SUBSEQ VISIT: HCPCS | Performed by: FAMILY MEDICINE

## 2025-01-22 PROCEDURE — 1160F RVW MEDS BY RX/DR IN RCRD: CPT | Performed by: FAMILY MEDICINE

## 2025-01-22 PROCEDURE — 1126F AMNT PAIN NOTED NONE PRSNT: CPT | Performed by: FAMILY MEDICINE

## 2025-01-22 PROCEDURE — 99214 OFFICE O/P EST MOD 30 MIN: CPT | Performed by: FAMILY MEDICINE

## 2025-01-22 PROCEDURE — 3077F SYST BP >= 140 MM HG: CPT | Performed by: FAMILY MEDICINE

## 2025-01-22 PROCEDURE — 1170F FXNL STATUS ASSESSED: CPT | Performed by: FAMILY MEDICINE

## 2025-01-22 NOTE — PROGRESS NOTES
Subjective   The ABCs of the Annual Wellness Visit  Medicare Wellness Visit      Alissa Bruce is a 69 y.o. patient who presents for a Medicare Wellness Visit.    The following portions of the patient's history were reviewed and   updated as appropriate: allergies, current medications, past family history, past medical history, past social history, past surgical history, and problem list.    Compared to one year ago, the patient's physical   health is the same.  Compared to one year ago, the patient's mental   health is the same.    Recent Hospitalizations:  She was not admitted to the hospital during the last year.     Current Medical Providers:  Patient Care Team:  Rubén Garcia DO as PCP - General (Family Medicine)    Outpatient Medications Prior to Visit   Medication Sig Dispense Refill   • buPROPion SR (WELLBUTRIN SR) 100 MG 12 hr tablet TAKE 1 TABLET BY MOUTH TWICE DAILY (Patient taking differently: Pt taking ONCE daily.) 180 tablet 3   • doxepin (SINEquan) 25 MG capsule TAKE 1 CAPSULE BY MOUTH AT BEDTIME 90 capsule 3   • lithium carbonate 150 MG capsule TAKE 2 CAPSULES BY MOUTH AT BEDTIME 180 capsule 3   • Omega-3 Fatty Acids (Fish Oil) 1000 MG capsule delayed-release Take  by mouth.     • polyethylene glycol (MIRALAX) 17 g packet Take 17 g by mouth Daily.       No facility-administered medications prior to visit.     No opioid medication identified on active medication list. I have reviewed chart for other potential  high risk medication/s and harmful drug interactions in the elderly.      Aspirin is not on active medication list.  Aspirin use is not indicated based on review of current medical condition/s. Risk of harm outweighs potential benefits.  .    Patient Active Problem List   Diagnosis   • Depression with anxiety   • Right hip pain   • Encounter for annual wellness exam in Medicare patient   • Trochanteric bursitis of right hip   • Elevated systolic blood pressure reading with diagnosis of  "hypertension   • Stage 3a chronic kidney disease (CKD)   • Localized osteoporosis without current pathological fracture     Advance Care Planning Advance Directive is not on file.  ACP discussion was declined by the patient. Patient has an advance directive (not in EMR), copy requested.            Objective   Vitals:    25 1321   BP: 145/74   BP Location: Left arm   Patient Position: Sitting   Cuff Size: Adult   Pulse: 83   Resp: 16   Temp: 97.9 °F (36.6 °C)   SpO2: 100%   Weight: 68.7 kg (151 lb 6.4 oz)   Height: 160 cm (63\")   PainSc: 0-No pain       Estimated body mass index is 26.82 kg/m² as calculated from the following:    Height as of this encounter: 160 cm (63\").    Weight as of this encounter: 68.7 kg (151 lb 6.4 oz).                Does the patient have evidence of cognitive impairment? No  Lab Results   Component Value Date    TRIG 64 01/15/2025    HDL 81 (H) 01/15/2025     (H) 01/15/2025    VLDL 11 01/15/2025                                                                                                Health  Risk Assessment    Smoking Status:  Social History     Tobacco Use   Smoking Status Never   • Passive exposure: Past   Smokeless Tobacco Never     Alcohol Consumption:  Social History     Substance and Sexual Activity   Alcohol Use Never       Fall Risk Screen  STEADI Fall Risk Assessment was completed, and patient is at LOW risk for falls.Assessment completed on:2025    Depression Screening   Little interest or pleasure in doing things? Not at all   Feeling down, depressed, or hopeless? Not at all   PHQ-2 Total Score 0      Health Habits and Functional and Cognitive Screenin/22/2025     1:19 PM   Functional & Cognitive Status   Do you have difficulty preparing food and eating? No   Do you have difficulty bathing yourself, getting dressed or grooming yourself? No   Do you have difficulty using the toilet? No   Do you have difficulty moving around from place to place? No "   Do you have trouble with steps or getting out of a bed or a chair? No   Current Diet Well Balanced Diet   Dental Exam Up to date   Eye Exam Up to date   Exercise (times per week) 5 times per week   Current Exercises Include Walking   Do you need help using the phone?  No   Are you deaf or do you have serious difficulty hearing?  No   Do you need help to go to places out of walking distance? No   Do you need help shopping? No   Do you need help preparing meals?  No   Do you need help with housework?  No   Do you need help with laundry? No   Do you need help taking your medications? No   Do you need help managing money? No   Do you ever drive or ride in a car without wearing a seat belt? No   Have you felt unusual stress, anger or loneliness in the last month? No   Who do you live with? Spouse   If you need help, do you have trouble finding someone available to you? No   Have you been bothered in the last four weeks by sexual problems? No   Do you have difficulty concentrating, remembering or making decisions? No           Age-appropriate Screening Schedule:  Refer to the list below for future screening recommendations based on patient's age, sex and/or medical conditions. Orders for these recommended tests are listed in the plan section. The patient has been provided with a written plan.    Health Maintenance List  Health Maintenance   Topic Date Due   • COLORECTAL CANCER SCREENING  Never done   • ZOSTER VACCINE (1 of 2) Never done   • TDAP/TD VACCINES (2 - Tdap) 10/28/2007   • Pneumococcal Vaccine 65+ (1 of 1 - PCV) Never done   • COVID-19 Vaccine (4 - 2024-25 season) 09/01/2024   • INFLUENZA VACCINE  03/31/2025 (Originally 7/1/2024)   • BMI FOLLOWUP  03/07/2025   • ANNUAL WELLNESS VISIT  01/22/2026   • MAMMOGRAM  02/27/2026   • DXA SCAN  02/27/2026   • HEPATITIS C SCREENING  Completed                                                                                                                                     "            CMS Preventative Services Quick Reference  Risk Factors Identified During Encounter  reviewed    The above risks/problems have been discussed with the patient.  Pertinent information has been shared with the patient in the After Visit Summary.  An After Visit Summary and PPPS were made available to the patient.    Follow Up:   Next Medicare Wellness visit to be scheduled in 1 year.         Additional E&M Note during same encounter follows:  Patient has additional, significant, and separately identifiable condition(s)/problem(s) that require work above and beyond the Medicare Wellness Visit     Chief Complaint  Medicare Wellness-subsequent (Discuss lab results. ) and Bursitis (Right hip pain. )    Subjective   HPI  Alissa is also being seen today for additional medical problem/s.                Objective   Vital Signs:  /74 (BP Location: Left arm, Patient Position: Sitting, Cuff Size: Adult)   Pulse 83   Temp 97.9 °F (36.6 °C)   Resp 16   Ht 160 cm (63\")   Wt 68.7 kg (151 lb 6.4 oz)   SpO2 100%   BMI 26.82 kg/m²   Physical Exam  Vitals reviewed.   Constitutional:       Appearance: Normal appearance. She is well-developed.   HENT:      Head: Normocephalic and atraumatic.      Right Ear: External ear normal.      Left Ear: External ear normal.      Nose: Nose normal.   Eyes:      Conjunctiva/sclera: Conjunctivae normal.      Pupils: Pupils are equal, round, and reactive to light.   Cardiovascular:      Rate and Rhythm: Normal rate.   Pulmonary:      Effort: Pulmonary effort is normal.      Breath sounds: Normal breath sounds.   Abdominal:      General: There is no distension.   Skin:     General: Skin is warm and dry.   Neurological:      Mental Status: She is alert and oriented to person, place, and time. Mental status is at baseline.   Psychiatric:         Mood and Affect: Mood and affect normal.         Behavior: Behavior normal.         Thought Content: Thought content normal.         " Judgment: Judgment normal.       The following data was reviewed by: Rubén Garcia DO on 01/22/2025:    Common labs          5/2/2024    11:59 7/8/2024    13:17 1/15/2025    10:00   Common Labs   Glucose   91    BUN   39    Creatinine 1.34  1.15  1.27    Sodium   140    Potassium   4.5    Chloride   106    Calcium 9.5  9.1  9.8    Albumin   4.2    Total Bilirubin   0.3    Alkaline Phosphatase   71    AST (SGOT)   39    ALT (SGPT)   27    WBC   6.10    Hemoglobin   11.3    Hematocrit   35.9    Platelets   190    Total Cholesterol   219    Triglycerides   64    HDL Cholesterol   81    LDL Cholesterol    127              Assessment and Plan Additional age appropriate preventative wellness advice topics were discussed during today's preventative wellness exam(some topics already addressed during AWV portion of the note above):    Physical Activity: Advised cardiovascular activity 150 minutes per week as tolerated. (example brisk walk for 30 minutes, 5 days a week).   Nutrition: Discussed nutrition plan with patient. Information shared in after visit summary. Goal is for a well balanced diet to enhance overall health.           Screening for colon cancer    Orders:  •  Cologuard - Stool, Per Rectum; Future    Encounter for subsequent annual wellness visit (AWV) in Medicare patient         Stage 3a chronic kidney disease (CKD)  Renal condition is stable.  Continue current treatment regimen.  Renal condition will be reassessed in 3 months.    Orders:  •  CBC (No Diff); Future  •  Comprehensive metabolic panel; Future  •  Lipid panel; Future  •  Protein / Creatinine Ratio, Urine - Urine, Clean Catch; Future  •  Lithium level; Future    Trochanteric bursitis of right hip         Anemia due to stage 3a chronic kidney disease  Renal condition is stable.  Continue current treatment regimen.  Renal condition will be reassessed in 3 months.         Elevated systolic blood pressure reading with diagnosis of  hypertension  Hypertension is stable and controlled  Continue current treatment regimen.  Blood pressure will be reassessed in 6 months.         Localized osteoporosis without current pathological fracture            Reviewed AWV recommendations and ordered as appropriate, follow up annually for review, sooner if concerns    No depression, falls, dementia symptoms or other  Risk and home safety assessment good    Followed up on chronic conditions and ordered refills, appropriate monitoring labs additionally as needed every 6 months or sooner if indicated, controlled stable    Follow up at least every 3-6 months for chronic conditions and as scheduled with appropriate specialists as indicated otherwise    Reviewed labs, continue to monitor kidney function recheck 3 months blood pressure she states is at goal less than 140/90 at home advise increasing fluids and monitoring    Cologuard ordered for patient to complete colon cancer screening and shingles vaccine additionally in order to send to pharmacy    Continue medicines as prescribed monitor lithium level last was in therapeutic range could be related or affecting kidney function consider alternative medicine or indication    Continues to suffer from chronic right hip bursitis consider further evaluation imaging orthopedics if indicated         Follow Up   Return in about 4 months (around 5/22/2025), or if symptoms worsen or fail to improve, for Next scheduled follow up, Labs before, Recheck.  Patient was given instructions and counseling regarding her condition or for health maintenance advice. Please see specific information pulled into the AVS if appropriate.

## 2025-01-23 NOTE — ASSESSMENT & PLAN NOTE
Renal condition is stable.  Continue current treatment regimen.  Renal condition will be reassessed in 3 months.    Orders:    CBC (No Diff); Future    Comprehensive metabolic panel; Future    Lipid panel; Future    Protein / Creatinine Ratio, Urine - Urine, Clean Catch; Future    Lithium level; Future

## 2025-01-27 ENCOUNTER — TRANSCRIBE ORDERS (OUTPATIENT)
Dept: ADMINISTRATIVE | Facility: HOSPITAL | Age: 70
End: 2025-01-27
Payer: MEDICARE

## 2025-01-27 DIAGNOSIS — M81.0 OSTEOPOROSIS, POST-MENOPAUSAL: Primary | ICD-10-CM

## 2025-02-07 ENCOUNTER — HOSPITAL ENCOUNTER (OUTPATIENT)
Dept: INFUSION THERAPY | Facility: HOSPITAL | Age: 70
Discharge: HOME | End: 2025-02-07
Payer: MEDICARE

## 2025-02-07 VITALS
SYSTOLIC BLOOD PRESSURE: 162 MMHG | OXYGEN SATURATION: 100 % | TEMPERATURE: 98.3 F | BODY MASS INDEX: 26.84 KG/M2 | HEIGHT: 63 IN | DIASTOLIC BLOOD PRESSURE: 76 MMHG | RESPIRATION RATE: 20 BRPM | HEART RATE: 77 BPM | WEIGHT: 151.46 LBS

## 2025-02-07 DIAGNOSIS — M81.6 LOCALIZED OSTEOPOROSIS WITHOUT CURRENT PATHOLOGICAL FRACTURE: Primary | ICD-10-CM

## 2025-02-07 PROCEDURE — 96372 THER/PROPH/DIAG INJ SC/IM: CPT

## 2025-02-07 PROCEDURE — 25010000002 DENOSUMAB 60 MG/ML SOLUTION PREFILLED SYRINGE

## 2025-02-07 RX ADMIN — DENOSUMAB 60 MG: 60 INJECTION SUBCUTANEOUS at 07:22

## 2025-02-13 ENCOUNTER — OFFICE VISIT (OUTPATIENT)
Dept: PSYCHIATRY | Facility: CLINIC | Age: 70
End: 2025-02-13
Payer: MEDICARE

## 2025-02-13 VITALS
WEIGHT: 150.4 LBS | HEIGHT: 63 IN | BODY MASS INDEX: 26.65 KG/M2 | DIASTOLIC BLOOD PRESSURE: 74 MMHG | HEART RATE: 72 BPM | SYSTOLIC BLOOD PRESSURE: 148 MMHG

## 2025-02-13 DIAGNOSIS — F51.05 INSOMNIA DUE TO MENTAL CONDITION: Primary | ICD-10-CM

## 2025-02-13 DIAGNOSIS — F31.81 HYPOMANIC BIPOLAR II DISORDER WITH FULL REMISSION: ICD-10-CM

## 2025-02-13 DIAGNOSIS — F41.1 GENERALIZED ANXIETY DISORDER: ICD-10-CM

## 2025-02-13 RX ORDER — CHOLECALCIFEROL (VITAMIN D3) 25 MCG
2000 TABLET ORAL DAILY
COMMUNITY

## 2025-02-13 NOTE — PROGRESS NOTES
"Subjective   Alissa Bruce is a 69 y.o. female who presents today for initial evaluation     Referring Provider:  No referring provider defined for this encounter.    Chief Complaint: Anxiety    History of Present Illness:     22: Initial Chart review: Patient has a history of anxiety for several years, followed by [facility] during this time for medication management with little success.  Sent to us for a second opinion.  On Wellbutrin  twice daily, doxepin 25 nightly, Klonopin 1 mg p.o. 3 times daily as needed, but she takes about 1-1/2 tabs daily.  Nothing in PDMP.  No Care Everywhere.  Older labs from 2021 show reassuring CMP, thyroid studies, folate, iron studies, elevated lipids, reassuring B12, vitamin D, CBC.  No head imaging, EKG.    Psychotropic medication review includes Ativan, Vraylar, mirtazapine, Seroquel XR, Celexa, Cymbalta, Latuda, Adderall XR, Rexulti, Prozac, Abilify, Lamictal, BuSpar, Zoloft, Lexapro, Effexor.  There may have been others prior to [facility] management.    Chart review:   2025: fam med x2; infusion, ortho; reassuring folate, ferritin, iron profile, thyroid studies, B12, vit D, HCV ab, Li 0.6.  2024: infusion; cr 1.15, Li 0.8 at 9 am (takes it at night).  24: multiple visits. Cr 1.34 (persistently high since ), benign mammogram.  no new.    Plannin2024: Stable, well, no med changes. Reduce wellbutrin to once a day. 6w  24: Stable, well, no changes. 6m  : Stable, well, watch hair loss (can occur in up  to 10% taking lithium), may need to start zinc/selenium supplements. 6m    Visits (Below):  \"Alissa\" and \"Feng\"   Most concerning symptom: anxiety  Consider auvelity, MAOIs  Get back to gardening      2025:   In person interview:  \"Doing well.\"  No complaints  Tolerating being on less wellbutrin  Mood/Depression: stable, denies hypomanic s/sx  Anxiety: stable  Panic attacks: n  Energy: stable  Concentration: stable  Sleeping: " "stable  Eatin, 142, 140 lbs  Refills: y  Substances: denies  Therapy: none  Medication compliant: yes  SE: n  No SI HI AVH.      2024:   In person interview:  \"Everything's been good.\"  No complaints  Can I wean off bupropion?  Mood/Depression: stable, denies hypomanic s/sx  Anxiety: stable  Panic attacks: n  Energy: stable  Concentration: stable  Sleeping: stable  Eatin, 142, 140 lbs  Refills: y  Substances: denies  Therapy: none  Medication compliant: yes  SE: n  No SI HI AVH.    ...      : In person.  Interview:  Chart review:   Would rule out ADHD.  Would rule out PTSD, consider guanfacine, clonidine, prazosin.  His/Her Story: \"I was working with Delfina Quevedo.\"  P15, G14  Had been working with Kamilah Dee also, and Dr. Wallace  I never even saw Delfian -- maybe 2 years  Talks over me, talkative, but interruptible, long silences too  One day I woke up and \"something wasn't right\" 5 years ago  Didn't like the ativan  6 mos I was perfectly fine: 2018 ( to )?  2017, multiple losses, when it all began  Discussed how klonopin is addictive  Everything that I do \"excites me\"  Most of the day I watch TV  Depression/Mood:  Depressed mood, anhedonia, hopelessness, poor energy, poor concentration, weight loss or weight gain, psychomotor retardation or agitation, insomnia controlled on clonazepam.  Seasonal pattern:  Severity: Moderate  Duration: 5 years ago  Anxiety:  Uncontrolled worrying, muscle tension, fatigue, poor concentration, feeling on edge, irritability, insomnia.  Severity: Moderate to severe  Duration: 5 years ago  Panic attacks: n  Psych ROS: Positive for depression, anxiety.  Negative for psychosis and williams.  ADHD: Deferred  PTSD: Deferred  No SI HI AVH.  Substances: No  Therapy: Briefly  Medication compliant: y    Access to Firearms: Yes, locked away    PHQ-9 Depression Screening  PHQ-9 Total Score:      Little interest or pleasure in doing things?     Feeling down, depressed, " or hopeless?     Trouble falling or staying asleep, or sleeping too much?     Feeling tired or having little energy?     Poor appetite or overeating?     Feeling bad about yourself - or that you are a failure or have let yourself or your family down?     Trouble concentrating on things, such as reading the newspaper or watching television?     Moving or speaking so slowly that other people could have noticed? Or the opposite - being so fidgety or restless that you have been moving around a lot more than usual?     Thoughts that you would be better off dead, or of hurting yourself in some way?     PHQ-9 Total Score       VITOR-7       Past Surgical History:  Past Surgical History:   Procedure Laterality Date    COLONOSCOPY  Age 52    CYST REMOVAL Right 1977    wrist    EYE SURGERY  2004    Lasik to correct vision    TUBAL ABDOMINAL LIGATION  [id number]       Problem List:  Patient Active Problem List   Diagnosis    Depression with anxiety    Right hip pain    Encounter for annual wellness exam in Medicare patient    Trochanteric bursitis of right hip    Elevated systolic blood pressure reading with diagnosis of hypertension    Stage 3a chronic kidney disease (CKD)    Localized osteoporosis without current pathological fracture       Allergy:   Allergies   Allergen Reactions    Sulfa Antibiotics Unknown - Low Severity and Other (See Comments)        Discontinued Medications:  Medications Discontinued During This Encounter   Medication Reason    buPROPion SR (WELLBUTRIN SR) 100 MG 12 hr tablet *Therapy completed         Current Medications:   Current Outpatient Medications   Medication Sig Dispense Refill    cholecalciferol (Vitamin D, Cholecalciferol,) 25 MCG (1000 UT) tablet Take 2 tablets by mouth Daily.      doxepin (SINEquan) 25 MG capsule TAKE 1 CAPSULE BY MOUTH AT BEDTIME 90 capsule 3    lithium carbonate 150 MG capsule TAKE 2 CAPSULES BY MOUTH AT BEDTIME 180 capsule 3    Omega-3 Fatty Acids (Fish Oil) 1000 MG  capsule delayed-release Take  by mouth.      polyethylene glycol (MIRALAX) 17 g packet Take 17 g by mouth Daily.       No current facility-administered medications for this visit.       Past Medical History:  Past Medical History:   Diagnosis Date    Anxiety     Depression     Elevated systolic blood pressure reading with diagnosis of hypertension 2024    Headache Years    Morning headaches    Osteopenia     Visual impairment     Dry eyes       Past Psychiatric History:  Began Treatment: a few years ago  Diagnoses: dep and anx, bipolar 2  Psychiatrist: [facility]  Therapist: yes briefly  Admission History:Denies  Medication Trials:    Multiple, can't remember inidivdual effects    Only one that helped me get back to my old self was zoloft during those six months    Self Harm: Denies  Suicide Attempts:Denies   Psychosis, Anxiety, Depression: Denies    Substance Abuse History:   Types:Denies all, including illicit  Withdrawal Symptoms:Denies  Longest Period Sober:Not Applicable   AA: Not applicable     Social History:  Martial Status:  Employed:No  Kids:Yes  House:Lives in a house   History: Denies    Social History     Socioeconomic History    Marital status:    Tobacco Use    Smoking status: Never     Passive exposure: Past    Smokeless tobacco: Never   Vaping Use    Vaping status: Never Used   Substance and Sexual Activity    Alcohol use: Never    Drug use: Never    Sexual activity: Yes     Partners: Male     Birth control/protection: None, Tubal ligation, Surgical     Comment: Cyst removal right wrist       Family History:   Suicide Attempts: Denies  Suicide Completions:Denies      Family History   Problem Relation Age of Onset    No Known Problems Father     Hyperlipidemia Mother          Bypass surgery in     Hypertension Mother     Arthritis Brother     Stroke Brother     Bipolar disorder Sister         [id number] ()    Mental illness Sister              "Depression Sister             Miscarriages / Stillbirths Sister     No Known Problems Paternal Grandfather     No Known Problems Paternal Grandmother     No Known Problems Maternal Grandmother     No Known Problems Maternal Grandfather     No Known Problems Maternal Aunt     No Known Problems Maternal Uncle     No Known Problems Paternal Aunt     No Known Problems Paternal Uncle     No Known Problems Cousin     Cancer Brother             ADD / ADHD Neg Hx     Alcohol abuse Neg Hx     Anxiety disorder Neg Hx     Dementia Neg Hx     Drug abuse Neg Hx     OCD Neg Hx     Paranoid behavior Neg Hx     Schizophrenia Neg Hx     Seizures Neg Hx     Self-Injurious Behavior  Neg Hx     Suicide Attempts Neg Hx     Breast cancer Neg Hx     Uterine cancer Neg Hx     Ovarian cancer Neg Hx     Colon cancer Neg Hx        Developmental History:       Childhood: Denies Abuse  High School:Completed  College: denies    Mental Status Exam  Appearance  : groomed, good eye contact, normal street clothes  Behavior  : pleasant and cooperative  Motor  : No abnormal  Speech  : normal rhythm, rate, volume, tone, not hyperverbal, not pressured, normal prosidy  Mood  : \"doing well\"  Affect  : euthymic, mood congruent, good variability  Thought Content  : negative suicidal ideations, negative homicidal ideations, negative obsessions  Perceptions  : negative auditory hallucinations, negative visual hallucinations  Thought Process  : not as tangential  Insight/Judgement  : Fair/fair  Cognition  : grossly intact  Attention   : intact      Review of Systems:  Review of Systems   Constitutional:  Negative for chills, diaphoresis, fatigue and fever.   HENT:  Negative for congestion, drooling and sore throat.    Eyes:  Negative for visual disturbance.   Respiratory:  Negative for cough, chest tightness and shortness of breath.    Cardiovascular:  Negative for chest pain, palpitations and leg swelling.   Gastrointestinal:  Negative for " "abdominal pain, diarrhea, nausea and vomiting.   Endocrine: Negative for cold intolerance and heat intolerance.   Genitourinary:  Negative for difficulty urinating and dysuria.   Musculoskeletal:  Negative for joint swelling, myalgias and neck pain.   Skin:  Negative for rash.   Allergic/Immunologic: Negative for immunocompromised state.   Neurological:  Negative for dizziness, seizures, speech difficulty, weakness, light-headedness, numbness and headaches.   Psychiatric/Behavioral:  Negative for agitation and sleep disturbance.        Physical Exam:  Physical Exam    Vital Signs:   /74   Pulse 72   Ht 160 cm (63\")   Wt 68.2 kg (150 lb 6.4 oz)   BMI 26.64 kg/m²      Lab Results:   Lab on 01/15/2025   Component Date Value Ref Range Status    Vitamin B-12 01/15/2025 483  211 - 946 pg/mL Final    Lithium 01/15/2025 0.6  0.6 - 1.2 mmol/L Final    WBC 01/15/2025 6.10  3.40 - 10.80 10*3/mm3 Final    RBC 01/15/2025 3.78  3.77 - 5.28 10*6/mm3 Final    Hemoglobin 01/15/2025 11.3 (L)  12.0 - 15.9 g/dL Final    Hematocrit 01/15/2025 35.9  34.0 - 46.6 % Final    MCV 01/15/2025 95.0  79.0 - 97.0 fL Final    MCH 01/15/2025 29.9  26.6 - 33.0 pg Final    MCHC 01/15/2025 31.5  31.5 - 35.7 g/dL Final    RDW 01/15/2025 12.2 (L)  12.3 - 15.4 % Final    RDW-SD 01/15/2025 42.6  37.0 - 54.0 fl Final    MPV 01/15/2025 13.0 (H)  6.0 - 12.0 fL Final    Platelets 01/15/2025 190  140 - 450 10*3/mm3 Final    Glucose 01/15/2025 91  65 - 99 mg/dL Final    BUN 01/15/2025 39 (H)  8 - 23 mg/dL Final    Creatinine 01/15/2025 1.27 (H)  0.57 - 1.00 mg/dL Final    Sodium 01/15/2025 140  136 - 145 mmol/L Final    Potassium 01/15/2025 4.5  3.5 - 5.2 mmol/L Final    Chloride 01/15/2025 106  98 - 107 mmol/L Final    CO2 01/15/2025 24.2  22.0 - 29.0 mmol/L Final    Calcium 01/15/2025 9.8  8.6 - 10.5 mg/dL Final    Total Protein 01/15/2025 7.2  6.0 - 8.5 g/dL Final    Albumin 01/15/2025 4.2  3.5 - 5.2 g/dL Final    ALT (SGPT) 01/15/2025 27  1 - 33 " U/L Final    AST (SGOT) 01/15/2025 39 (H)  1 - 32 U/L Final    Alkaline Phosphatase 01/15/2025 71  39 - 117 U/L Final    Total Bilirubin 01/15/2025 0.3  0.0 - 1.2 mg/dL Final    Globulin 01/15/2025 3.0  gm/dL Final    A/G Ratio 01/15/2025 1.4  g/dL Final    BUN/Creatinine Ratio 01/15/2025 30.7 (H)  7.0 - 25.0 Final    Anion Gap 01/15/2025 9.8  5.0 - 15.0 mmol/L Final    eGFR 01/15/2025 46.2 (L)  >60.0 mL/min/1.73 Final    Total Cholesterol 01/15/2025 219 (H)  0 - 200 mg/dL Final    Triglycerides 01/15/2025 64  0 - 150 mg/dL Final    HDL Cholesterol 01/15/2025 81 (H)  40 - 60 mg/dL Final    LDL Cholesterol  01/15/2025 127 (H)  0 - 100 mg/dL Final    VLDL Cholesterol 01/15/2025 11  5 - 40 mg/dL Final    LDL/HDL Ratio 01/15/2025 1.55   Final    TSH 01/15/2025 2.370  0.270 - 4.200 uIU/mL Final    Free T4 01/15/2025 1.07  0.92 - 1.68 ng/dL Final    Folate 01/15/2025 7.20  4.78 - 24.20 ng/mL Final    Iron 01/15/2025 102  37 - 145 mcg/dL Final    Iron Saturation (TSAT) 01/15/2025 26  20 - 50 % Final    Transferrin 01/15/2025 259  200 - 360 mg/dL Final    TIBC 01/15/2025 386  298 - 536 mcg/dL Final    Ferritin 01/15/2025 101.00  13.00 - 150.00 ng/mL Final    25 Hydroxy, Vitamin D 01/15/2025 30.6  30.0 - 100.0 ng/ml Final    Hepatitis C Ab 01/15/2025 Non-Reactive  Non-Reactive Final       EKG Results:  No orders to display       Imaging Results:  No Images in the past 120 days found..      Assessment & Plan   Diagnoses and all orders for this visit:    1. 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 Generalized anxiety disorder    3. Hypomanic bipolar II disorder with full remission        Visit Diagnoses:    ICD-10-CM ICD-9-CM   1. Insomnia due to mental condition  F51.05 300.9     327.02   2. Generalized anxiety disorder  F41.1 300.02   3. Hypomanic bipolar II disorder with full remission  F31.81 296.89     02/13/2025: Stable, stop wellbutrin. Next visit, reduce doxepin to 10 mg qhs. 6w    12/30/2024: Stable, well, no  "med changes. Reduce wellbutrin to once a day. 6w    6/25/24: Stable, well, no changes. 6m    12/29: Stable, well, watch hair loss (can occur in up  to 10% taking lithium), may need to start zinc/selenium supplements. 6m    6/29: stable, well. No changes. If elevated bps at home over the next 30 days, pt to call me and pcp. 6 mos    4/27: Stable, well. Not even taking clonazepam.     3/31: Much improved, \"best I've been in 5 years.\" No changes now, low threshold to increase lithium to 450 mg. May need neuro workup in the past due to clear bipolar II late-emerging hypomania.  Watch sleeping closely, may need to increase doxepin.     3/7: [place] today. Longer appnt. Seems more hypomanic now. Increase lithium, watch dry lip. SE with every med?     2/20: [place] at next visit, then neuropsych testing. Seems hypomanic, improved but not tolerating abilify. Stop abilify and start lithium qhs.     1/17: Increase abilify, a bit more linear. Answered questions more accurately. Appears bipolar hypomanic.     12/22: Very talkative and tangential, hard to get anything out of the patient.  Trouble with answering questions, avoids saying yes or no to yes or no questions.  Start Abilify and see back in 4 weeks.  Request records from [facility].  Unclear what the diagnosis is but it could be bipolar.    PLAN:  Safety: No acute safety concerns  Therapy: None  Risk Assessment: Risk of self-harm acutely is moderate.  Risk factors include anxiety disorder, mood disorder, access to weapons and recent psychosocial stressors (pandemic). Protective factors include no family history, no present SI, no history of suicide attempts or self-harm in the past, minimal AODA, healthcare seeking, future orientation, willingness to engage in care.  Risk of self-harm chronically is also moderate, but could be further elevated in the event of treatment noncompliance and/or AODA.  Meds:  CONTINUE lithium 300 mg nightly. Risks, benefits, alternatives discussed with " patient including ataxia, sedation, dizziness/falls risk, dysarthria, tremor, possible nephrogenic diabetes insipidus, diarrhea, nausea, weight gain, rash, and possible hypothyroid goiter.  Patient also counseled to monitor hydration status and sodium intake as changes can result in rapidly increasing, even toxic, levels of lithium. Patient also counseled regarding the use of diuretics and their ability to increase lithium levels. After discussion of these risks and benefits, the patient voiced understanding and agreed to proceed.  STOP Wellbutrin  mg QDAY. Risks, benefits, alternatives discussed with patient including nausea, GI upset, increased energy, exacerbation of irritability, insomnia, lowering of seizure threshold.  After discussion of these risks and benefits, the patient voiced understanding and agreed to proceed.  STOPPED clonazepam 1 mg tid prn. Risks, benefits, and alternatives discussed with patient including sedation/falls risk, dizziness, disinhibition, headache, fatigue, dry mouth, blurred vision, constipation, nausea, diarrhea, heartburn, unusual taste in mouth, urinary retention, increased appetite, restlessness, sexual dysfunction, sweating, weight gain, cardiac arrhythmias, seizures, and fall risk.  After discussion of these risks and benefits, patient voiced understanding and agreed to proceed.  Reymundo reviewed, UDS ordered, and controlled substance agreement signed & witnessed.  CONTINUE  Doxepin 25 mg nightly. Risks, benefits, alternatives discussed with patient including dizziness/falls risk, sedation, GI upset, constipation, possible blurred vision.  After discussion of these risks and benefits, the patient voiced understanding and agreed to proceed.  S/P:  Abilify 2 to 5 mg nightly. Akathisia.   Labs: None    Patient screened positive for depression based on a PHQ-9 score of  on . Follow-up recommendations include: Prescribed antidepressant medication treatment and Suicide Risk  Assessment performed.           TREATMENT PLAN/GOALS: Continue supportive psychotherapy efforts and medications as indicated. Treatment and medication options discussed during today's visit. Patient acknowledged and verbally consented to continue with current treatment plan and was educated on the importance of compliance with treatment and follow-up appointments.    MEDICATION ISSUES:  BRYNN reviewed as expected.  Discussed medication options and treatment plan of prescribed medication as well as the risks, benefits, and side effects including potential falls, possible impaired driving and metabolic adversities among others. Patient is agreeable to call the office with any worsening of symptoms or onset of side effects. Patient is agreeable to call 911 or go to the nearest ER should he/she begin having SI/HI. No medication side effects or related complaints today.     MEDS ORDERED DURING VISIT:  No orders of the defined types were placed in this encounter.      Return in about 6 weeks (around 3/27/2025).         This document has been electronically signed by Kai Elliott MD  February 13, 2025 11:26 EST    Dictated Utilizing Dragon Dictation: Part of this note may be an electronic transcription/translation of spoken language to printed text using the Dragon Dictation System.

## 2025-03-11 ENCOUNTER — TRANSCRIBE ORDERS (OUTPATIENT)
Dept: ADMINISTRATIVE | Facility: HOSPITAL | Age: 70
End: 2025-03-11
Payer: MEDICARE

## 2025-03-11 DIAGNOSIS — M81.0 OSTEOPOROSIS, POST-MENOPAUSAL: Primary | ICD-10-CM

## 2025-03-17 ENCOUNTER — TRANSCRIBE ORDERS (OUTPATIENT)
Dept: ADMINISTRATIVE | Facility: HOSPITAL | Age: 70
End: 2025-03-17
Payer: MEDICARE

## 2025-03-17 DIAGNOSIS — M81.0 POST-MENOPAUSAL OSTEOPOROSIS: Primary | ICD-10-CM

## 2025-03-17 DIAGNOSIS — Z78.0 POST-MENOPAUSAL: ICD-10-CM

## 2025-03-17 DIAGNOSIS — E55.9 VITAMIN D DEFICIENCY: ICD-10-CM

## 2025-03-17 DIAGNOSIS — Z79.899 ENCOUNTER FOR LONG-TERM CURRENT USE OF MEDICATION: ICD-10-CM

## 2025-03-18 ENCOUNTER — HOSPITAL ENCOUNTER (OUTPATIENT)
Dept: BONE DENSITY | Facility: HOSPITAL | Age: 70
Discharge: HOME | End: 2025-03-18
Payer: MEDICARE

## 2025-03-18 DIAGNOSIS — Z78.0 POST-MENOPAUSAL: ICD-10-CM

## 2025-03-18 DIAGNOSIS — E55.9 VITAMIN D DEFICIENCY: ICD-10-CM

## 2025-03-18 DIAGNOSIS — M81.0 POST-MENOPAUSAL OSTEOPOROSIS: ICD-10-CM

## 2025-03-18 DIAGNOSIS — Z79.899 ENCOUNTER FOR LONG-TERM CURRENT USE OF MEDICATION: ICD-10-CM

## 2025-03-18 PROCEDURE — 77080 DXA BONE DENSITY AXIAL: CPT

## 2025-03-27 ENCOUNTER — OFFICE VISIT (OUTPATIENT)
Dept: PSYCHIATRY | Facility: CLINIC | Age: 70
End: 2025-03-27
Payer: MEDICARE

## 2025-03-27 VITALS
WEIGHT: 152 LBS | SYSTOLIC BLOOD PRESSURE: 133 MMHG | DIASTOLIC BLOOD PRESSURE: 86 MMHG | BODY MASS INDEX: 26.93 KG/M2 | HEIGHT: 63 IN | HEART RATE: 71 BPM

## 2025-03-27 DIAGNOSIS — F41.1 GENERALIZED ANXIETY DISORDER: ICD-10-CM

## 2025-03-27 DIAGNOSIS — F31.81 HYPOMANIC BIPOLAR II DISORDER WITH FULL REMISSION: ICD-10-CM

## 2025-03-27 DIAGNOSIS — F51.05 INSOMNIA DUE TO MENTAL CONDITION: Primary | ICD-10-CM

## 2025-03-27 RX ORDER — DOXEPIN HYDROCHLORIDE 10 MG/1
10 CAPSULE ORAL
Qty: 90 CAPSULE | Refills: 1 | Status: SHIPPED | OUTPATIENT
Start: 2025-03-27

## 2025-03-27 NOTE — PROGRESS NOTES
"Subjective   Alissa Bruce is a 69 y.o. female who presents today for initial evaluation     Referring Provider:  No referring provider defined for this encounter.    Chief Complaint: Anxiety    History of Present Illness:     22: Initial Chart review: Patient has a history of anxiety for several years, followed by [facility] during this time for medication management with little success.  Sent to us for a second opinion.  On Wellbutrin  twice daily, doxepin 25 nightly, Klonopin 1 mg p.o. 3 times daily as needed, but she takes about 1-1/2 tabs daily.  Nothing in PDMP.  No Care Everywhere.  Older labs from 2021 show reassuring CMP, thyroid studies, folate, iron studies, elevated lipids, reassuring B12, vitamin D, CBC.  No head imaging, EKG.    Psychotropic medication review includes Ativan, Vraylar, mirtazapine, Seroquel XR, Celexa, Cymbalta, Latuda, Adderall XR, Rexulti, Prozac, Abilify, Lamictal, BuSpar, Zoloft, Lexapro, Effexor.  There may have been others prior to [facility] management.    Chart review:   2025: ortho; DEXA shows osteoporosis.  2025: fam med x2; infusion, ortho; reassuring folate, ferritin, iron profile, thyroid studies, B12, vit D, HCV ab, Li 0.6.  2024: infusion; cr 1.15, Li 0.8 at 9 am (takes it at night).  24: multiple visits. Cr 1.34 (persistently high since ), benign mammogram.  no new.    Plannin2025: Stable, stop wellbutrin. Next visit, reduce doxepin to 10 mg qhs. 6w  2024: Stable, well, no med changes. Reduce wellbutrin to once a day. 6w  24: Stable, well, no changes. 6m    Visits (Below):  \"Alissa\" and \"Feng\"   Most concerning symptom: anxiety  Consider auvelity, MAOIs  Get back to gardening      2025:   In person interview:  \"Doing well.\"  No complaints  Tolerating being on less wellbutrin  Mood/Depression: stable, denies hypomanic s/sx  Anxiety: stable  Panic attacks: n  Energy: stable  Concentration: stable  Sleeping: " "stable  Eatin, 142, 140 lbs  Refills: y  Substances: denies  Therapy: none  Medication compliant: yes  SE: n  No SI HI AVH.      2025:   In person interview:  \"Doing well.\"  No complaints  Tolerating being on less wellbutrin  Mood/Depression: stable, denies hypomanic s/sx  Anxiety: stable  Panic attacks: n  Energy: stable  Concentration: stable  Sleeping: stable  Eatin, 142, 140 lbs  Refills: y  Substances: denies  Therapy: none  Medication compliant: yes  SE: n  No SI HI AVH.      2024:   In person interview:  \"Everything's been good.\"  No complaints  Can I wean off bupropion?  Mood/Depression: stable, denies hypomanic s/sx  Anxiety: stable  Panic attacks: n  Energy: stable  Concentration: stable  Sleeping: stable  Eatin, 142, 140 lbs  Refills: y  Substances: denies  Therapy: none  Medication compliant: yes  SE: n  No SI HI AVH.    ...      : In person.  Interview:  Chart review:   Would rule out ADHD.  Would rule out PTSD, consider guanfacine, clonidine, prazosin.  His/Her Story: \"I was working with Delfina Quevedo.\"  P15, G14  Had been working with Kamilah Dee also, and Dr. Wallace  I never even saw Delfina -- maybe 2 years  Talks over me, talkative, but interruptible, long silences too  One day I woke up and \"something wasn't right\" 5 years ago  Didn't like the ativan  6 mos I was perfectly fine: 2018 ( to )?  2017, multiple losses, when it all began  Discussed how klonopin is addictive  Everything that I do \"excites me\"  Most of the day I watch TV  Depression/Mood:  Depressed mood, anhedonia, hopelessness, poor energy, poor concentration, weight loss or weight gain, psychomotor retardation or agitation, insomnia controlled on clonazepam.  Seasonal pattern:  Severity: Moderate  Duration: 5 years ago  Anxiety:  Uncontrolled worrying, muscle tension, fatigue, poor concentration, feeling on edge, irritability, insomnia.  Severity: Moderate to severe  Duration: 5 years " ago  Panic attacks: n  Psych ROS: Positive for depression, anxiety.  Negative for psychosis and williams.  ADHD: Deferred  PTSD: Deferred  No SI HI AVH.  Substances: No  Therapy: Briefly  Medication compliant: y    Access to Firearms: Yes, locked away    PHQ-9 Depression Screening  PHQ-9 Total Score:      Little interest or pleasure in doing things?     Feeling down, depressed, or hopeless?     Trouble falling or staying asleep, or sleeping too much?     Feeling tired or having little energy?     Poor appetite or overeating?     Feeling bad about yourself - or that you are a failure or have let yourself or your family down?     Trouble concentrating on things, such as reading the newspaper or watching television?     Moving or speaking so slowly that other people could have noticed? Or the opposite - being so fidgety or restless that you have been moving around a lot more than usual?     Thoughts that you would be better off dead, or of hurting yourself in some way?     PHQ-9 Total Score       VITOR-7       Past Surgical History:  Past Surgical History:   Procedure Laterality Date    COLONOSCOPY  Age 52    CYST REMOVAL Right 1977    wrist    EYE SURGERY  2004    Lasik to correct vision    TUBAL ABDOMINAL LIGATION  [id number]       Problem List:  Patient Active Problem List   Diagnosis    Depression with anxiety    Right hip pain    Encounter for annual wellness exam in Medicare patient    Trochanteric bursitis of right hip    Elevated systolic blood pressure reading with diagnosis of hypertension    Stage 3a chronic kidney disease (CKD)    Localized osteoporosis without current pathological fracture       Allergy:   Allergies   Allergen Reactions    Sulfa Antibiotics Unknown - Low Severity and Other (See Comments)        Discontinued Medications:  Medications Discontinued During This Encounter   Medication Reason    doxepin (SINEquan) 25 MG capsule Reorder           Current Medications:   Current Outpatient Medications    Medication Sig Dispense Refill    cholecalciferol (Vitamin D, Cholecalciferol,) 25 MCG (1000 UT) tablet Take 2 tablets by mouth Daily.      doxepin (SINEquan) 10 MG capsule Take 1 capsule by mouth every night at bedtime. 90 capsule 1    lithium carbonate 150 MG capsule TAKE 2 CAPSULES BY MOUTH AT BEDTIME 180 capsule 3    Omega-3 Fatty Acids (Fish Oil) 1000 MG capsule delayed-release Take  by mouth.      polyethylene glycol (MIRALAX) 17 g packet Take 17 g by mouth Daily.       No current facility-administered medications for this visit.       Past Medical History:  Past Medical History:   Diagnosis Date    Anxiety     Depression     Elevated systolic blood pressure reading with diagnosis of hypertension 2024    Headache Years    Morning headaches    Osteopenia     Visual impairment     Dry eyes       Past Psychiatric History:  Began Treatment: a few years ago  Diagnoses: dep and anx, bipolar 2  Psychiatrist: [facility]  Therapist: yes briefly  Admission History:Denies  Medication Trials:    Multiple, can't remember inidivdual effects    Only one that helped me get back to my old self was zoloft during those six months    Self Harm: Denies  Suicide Attempts:Denies   Psychosis, Anxiety, Depression: Denies    Substance Abuse History:   Types:Denies all, including illicit  Withdrawal Symptoms:Denies  Longest Period Sober:Not Applicable   AA: Not applicable     Social History:  Martial Status:  Employed:No  Kids:Yes  House:Lives in a house   History: Denies    Social History     Socioeconomic History    Marital status:    Tobacco Use    Smoking status: Never     Passive exposure: Past    Smokeless tobacco: Never   Vaping Use    Vaping status: Never Used   Substance and Sexual Activity    Alcohol use: Never    Drug use: Never    Sexual activity: Yes     Partners: Male     Birth control/protection: None, Tubal ligation, Surgical     Comment: Cyst removal right wrist       Family History:  "  Suicide Attempts: Denies  Suicide Completions:Denies      Family History   Problem Relation Age of Onset    No Known Problems Father     Hyperlipidemia Mother          Bypass surgery in     Hypertension Mother     Arthritis Brother     Stroke Brother     Bipolar disorder Sister         [id number] ()    Mental illness Sister             Depression Sister             Miscarriages / Stillbirths Sister     No Known Problems Paternal Grandfather     No Known Problems Paternal Grandmother     No Known Problems Maternal Grandmother     No Known Problems Maternal Grandfather     No Known Problems Maternal Aunt     No Known Problems Maternal Uncle     No Known Problems Paternal Aunt     No Known Problems Paternal Uncle     No Known Problems Cousin     Cancer Brother             ADD / ADHD Neg Hx     Alcohol abuse Neg Hx     Anxiety disorder Neg Hx     Dementia Neg Hx     Drug abuse Neg Hx     OCD Neg Hx     Paranoid behavior Neg Hx     Schizophrenia Neg Hx     Seizures Neg Hx     Self-Injurious Behavior  Neg Hx     Suicide Attempts Neg Hx     Breast cancer Neg Hx     Uterine cancer Neg Hx     Ovarian cancer Neg Hx     Colon cancer Neg Hx        Developmental History:       Childhood: Denies Abuse  High School:Completed  College: denies    Mental Status Exam  Appearance  : groomed, good eye contact, normal street clothes  Behavior  : pleasant and cooperative  Motor  : No abnormal  Speech  : normal rhythm, rate, volume, tone, not hyperverbal, not pressured, normal prosidy  Mood  : \"doing well\"  Affect  : euthymic, mood congruent, good variability  Thought Content  : negative suicidal ideations, negative homicidal ideations, negative obsessions  Perceptions  : negative auditory hallucinations, negative visual hallucinations  Thought Process  : not as tangential  Insight/Judgement  : Fair/fair  Cognition  : grossly intact  Attention   : intact      Review of Systems:  Review of " "Systems   Constitutional:  Negative for chills, diaphoresis, fatigue and fever.   HENT:  Negative for congestion, drooling and sore throat.    Eyes:  Negative for visual disturbance.   Respiratory:  Negative for cough, chest tightness and shortness of breath.    Cardiovascular:  Positive for leg swelling. Negative for chest pain and palpitations.   Gastrointestinal:  Negative for abdominal pain, diarrhea, nausea and vomiting.   Endocrine: Negative for cold intolerance and heat intolerance.   Genitourinary:  Negative for difficulty urinating and dysuria.   Musculoskeletal:  Negative for joint swelling, myalgias and neck pain.   Skin:  Negative for rash.   Allergic/Immunologic: Negative for immunocompromised state.   Neurological:  Negative for dizziness, seizures, speech difficulty, weakness, light-headedness, numbness and headaches.   Psychiatric/Behavioral:  Negative for agitation and sleep disturbance.        Physical Exam:  Physical Exam    Vital Signs:   /86   Pulse 71   Ht 160 cm (62.99\")   Wt 68.9 kg (152 lb)   BMI 26.93 kg/m²      Lab Results:   Results Encounter on 02/05/2025   Component Date Value Ref Range Status    Cologuard 02/10/2025 Negative  Negative Final    Comment: NEGATIVE TEST RESULT. A negative Cologuard result indicates a low likelihood that a colorectal cancer (CRC) or advanced adenoma (adenomatous polyps with more advanced pre-malignant features)  is present. The chance that a person with a negative Cologuard test has a colorectal cancer is less than 1 in 1500 (negative predictive value >99.9%) or has an  advanced adenoma is less than  5.3% (negative predictive value 94.7%). These data are based on a prospective cross-sectional study of 10,000 individuals at average risk for colorectal cancer who were screened with both Cologuard and colonoscopy. (Ruby Herrera al, N Engl J Med 2014;370(14):4452-1559) The normal value (reference range) for this assay is negative.    COLOGUARD " RE-SCREENING RECOMMENDATION: Periodic colorectal cancer screening is an important part of preventive healthcare for asymptomatic individuals at average risk for colorectal cancer.  Following a negative Cologuard result, the American Cancer Society and U.S.                            Multi-Society Task Force screening guidelines recommend a Cologuard re-screening interval of 3 years.   References: American Cancer Society Guideline for Colorectal Cancer Screening: https://www.cancer.org/cancer/colon-rectal-cancer/kqidsgzqq-txvpodeto-pupsqjw/acs-recommendations.html.; Mikel DK, Keri RAMOS, Luz Elena ROJASK, Colorectal Cancer Screening: Recommendations for Physicians and Patients from the U.S. Multi-Society Task Force on Colorectal Cancer Screening , Am J Gastroenterology 2017; 112:7889-8331.    TEST DESCRIPTION: Composite algorithmic analysis of stool DNA-biomarkers with hemoglobin immunoassay.   Quantitative values of individual biomarkers are not reportable and are not associated with individual biomarker result reference ranges. Cologuard is intended for colorectal cancer screening of adults of either sex, 45 years or older, who are at average-risk for colorectal cancer (CRC). Cologuard has been approved for use by the U.S. FDA. The performance of Cologuard was                            established in a cross sectional study of average-risk adults aged 50-84. Cologuard performance in patients ages 45 to 49 years was estimated by sub-group analysis of near-age groups. Colonoscopies performed for a positive result may find as the most clinically significant lesion: colorectal cancer [4.0%], advanced adenoma (including sessile serrated polyps greater than or equal to 1cm diameter) [20%] or non- advanced adenoma [31%]; or no colorectal neoplasia [45%]. These estimates are derived from a prospective cross-sectional screening study of 10,000 individuals at average risk for colorectal cancer who were screened with both Cologuard  and colonoscopy. (Ruby Herrera al, N Engl J Med 2014;370(14):2096-1483.) Cologuard may produce a false negative or false positive result (no colorectal cancer or precancerous polyp present at colonoscopy follow up). A negative Cologuard test result does not guarantee the absence of CRC or advanced adenoma (pre-cancer). The current Cologuard                            screening interval is every 3 years. (American Cancer Society and U.S. Multi-Society Task Force). Cologuard performance data in a 10,000 patient pivotal study using colonoscopy as the reference method can be accessed at the following location: [email or web address]. Additional description of the Cologuard test process, warnings and precautions can be found at [email or web address].     Lab on 01/15/2025   Component Date Value Ref Range Status    Vitamin B-12 01/15/2025 483  211 - 946 pg/mL Final    Lithium 01/15/2025 0.6  0.6 - 1.2 mmol/L Final    WBC 01/15/2025 6.10  3.40 - 10.80 10*3/mm3 Final    RBC 01/15/2025 3.78  3.77 - 5.28 10*6/mm3 Final    Hemoglobin 01/15/2025 11.3 (L)  12.0 - 15.9 g/dL Final    Hematocrit 01/15/2025 35.9  34.0 - 46.6 % Final    MCV 01/15/2025 95.0  79.0 - 97.0 fL Final    MCH 01/15/2025 29.9  26.6 - 33.0 pg Final    MCHC 01/15/2025 31.5  31.5 - 35.7 g/dL Final    RDW 01/15/2025 12.2 (L)  12.3 - 15.4 % Final    RDW-SD 01/15/2025 42.6  37.0 - 54.0 fl Final    MPV 01/15/2025 13.0 (H)  6.0 - 12.0 fL Final    Platelets 01/15/2025 190  140 - 450 10*3/mm3 Final    Glucose 01/15/2025 91  65 - 99 mg/dL Final    BUN 01/15/2025 39 (H)  8 - 23 mg/dL Final    Creatinine 01/15/2025 1.27 (H)  0.57 - 1.00 mg/dL Final    Sodium 01/15/2025 140  136 - 145 mmol/L Final    Potassium 01/15/2025 4.5  3.5 - 5.2 mmol/L Final    Chloride 01/15/2025 106  98 - 107 mmol/L Final    CO2 01/15/2025 24.2  22.0 - 29.0 mmol/L Final    Calcium 01/15/2025 9.8  8.6 - 10.5 mg/dL Final    Total Protein 01/15/2025 7.2  6.0 - 8.5 g/dL Final    Albumin 01/15/2025  4.2  3.5 - 5.2 g/dL Final    ALT (SGPT) 01/15/2025 27  1 - 33 U/L Final    AST (SGOT) 01/15/2025 39 (H)  1 - 32 U/L Final    Alkaline Phosphatase 01/15/2025 71  39 - 117 U/L Final    Total Bilirubin 01/15/2025 0.3  0.0 - 1.2 mg/dL Final    Globulin 01/15/2025 3.0  gm/dL Final    A/G Ratio 01/15/2025 1.4  g/dL Final    BUN/Creatinine Ratio 01/15/2025 30.7 (H)  7.0 - 25.0 Final    Anion Gap 01/15/2025 9.8  5.0 - 15.0 mmol/L Final    eGFR 01/15/2025 46.2 (L)  >60.0 mL/min/1.73 Final    Total Cholesterol 01/15/2025 219 (H)  0 - 200 mg/dL Final    Triglycerides 01/15/2025 64  0 - 150 mg/dL Final    HDL Cholesterol 01/15/2025 81 (H)  40 - 60 mg/dL Final    LDL Cholesterol  01/15/2025 127 (H)  0 - 100 mg/dL Final    VLDL Cholesterol 01/15/2025 11  5 - 40 mg/dL Final    LDL/HDL Ratio 01/15/2025 1.55   Final    TSH 01/15/2025 2.370  0.270 - 4.200 uIU/mL Final    Free T4 01/15/2025 1.07  0.92 - 1.68 ng/dL Final    Folate 01/15/2025 7.20  4.78 - 24.20 ng/mL Final    Iron 01/15/2025 102  37 - 145 mcg/dL Final    Iron Saturation (TSAT) 01/15/2025 26  20 - 50 % Final    Transferrin 01/15/2025 259  200 - 360 mg/dL Final    TIBC 01/15/2025 386  298 - 536 mcg/dL Final    Ferritin 01/15/2025 101.00  13.00 - 150.00 ng/mL Final    25 Hydroxy, Vitamin D 01/15/2025 30.6  30.0 - 100.0 ng/ml Final    Hepatitis C Ab 01/15/2025 Non-Reactive  Non-Reactive Final       EKG Results:  No orders to display       Imaging Results:  No Images in the past 120 days found..      Assessment & Plan   Diagnoses and all orders for this visit:    1. 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  Dispense: 90 capsule; Refill: 1    2. Generalized anxiety disorder    3. Hypomanic bipolar II disorder with full remission        Visit Diagnoses:    ICD-10-CM ICD-9-CM   1. Insomnia due to mental condition  F51.05 300.9     327.02   2. Generalized anxiety disorder  F41.1 300.02  "  3. Hypomanic bipolar II disorder with full remission  F31.81 296.89     03/27/2025: Stable, reduce doxepin. 6w    02/13/2025: Stable, stop wellbutrin. Next visit, reduce doxepin to 10 mg qhs. 6w    12/30/2024: Stable, well, no med changes. Reduce wellbutrin to once a day. 6w    6/25/24: Stable, well, no changes. 6m    12/29: Stable, well, watch hair loss (can occur in up  to 10% taking lithium), may need to start zinc/selenium supplements. 6m    6/29: stable, well. No changes. If elevated bps at home over the next 30 days, pt to call me and pcp. 6 mos    4/27: Stable, well. Not even taking clonazepam.     3/31: Much improved, \"best I've been in 5 years.\" No changes now, low threshold to increase lithium to 450 mg. May need neuro workup in the past due to clear bipolar II late-emerging hypomania.  Watch sleeping closely, may need to increase doxepin.     3/7: Tom Green today. Longer appnt. Seems more hypomanic now. Increase lithium, watch dry lip. SE with every med?     2/20: Tom Green at next visit, then neuropsych testing. Seems hypomanic, improved but not tolerating abilify. Stop abilify and start lithium qhs.     1/17: Increase abilify, a bit more linear. Answered questions more accurately. Appears bipolar hypomanic.     12/22: Very talkative and tangential, hard to get anything out of the patient.  Trouble with answering questions, avoids saying yes or no to yes or no questions.  Start Abilify and see back in 4 weeks.  Request records from [facility].  Unclear what the diagnosis is but it could be bipolar.    PLAN:  Safety: No acute safety concerns  Therapy: None  Risk Assessment: Risk of self-harm acutely is moderate.  Risk factors include anxiety disorder, mood disorder, access to weapons and recent psychosocial stressors (pandemic). Protective factors include no family history, no present SI, no history of suicide attempts or self-harm in the past, minimal AODA, healthcare seeking, future orientation, willingness to " engage in care.  Risk of self-harm chronically is also moderate, but could be further elevated in the event of treatment noncompliance and/or AODA.  Meds:  CONTINUE lithium 300 mg nightly. Risks, benefits, alternatives discussed with patient including ataxia, sedation, dizziness/falls risk, dysarthria, tremor, possible nephrogenic diabetes insipidus, diarrhea, nausea, weight gain, rash, and possible hypothyroid goiter.  Patient also counseled to monitor hydration status and sodium intake as changes can result in rapidly increasing, even toxic, levels of lithium. Patient also counseled regarding the use of diuretics and their ability to increase lithium levels. After discussion of these risks and benefits, the patient voiced understanding and agreed to proceed.  REDUCE Doxepin 25 to 10 mg nightly. Risks, benefits, alternatives discussed with patient including dizziness/falls risk, sedation, GI upset, constipation, possible blurred vision.  After discussion of these risks and benefits, the patient voiced understanding and agreed to proceed.  S/P:  Wellbutrin  mg QDAY. (1/25, pt's wish)  clonazepam 1 mg tid prn. (Not needed 3/25)  Abilify 2 to 5 mg nightly. Akathisia.   Labs: None    Patient screened positive for depression based on a PHQ-9 score of  on . Follow-up recommendations include: Prescribed antidepressant medication treatment and Suicide Risk Assessment performed.           TREATMENT PLAN/GOALS: Continue supportive psychotherapy efforts and medications as indicated. Treatment and medication options discussed during today's visit. Patient acknowledged and verbally consented to continue with current treatment plan and was educated on the importance of compliance with treatment and follow-up appointments.    MEDICATION ISSUES:  BRYNN reviewed as expected.  Discussed medication options and treatment plan of prescribed medication as well as the risks, benefits, and side effects including potential falls,  possible impaired driving and metabolic adversities among others. Patient is agreeable to call the office with any worsening of symptoms or onset of side effects. Patient is agreeable to call 911 or go to the nearest ER should he/she begin having SI/HI. No medication side effects or related complaints today.     MEDS ORDERED DURING VISIT:  New Medications Ordered This Visit   Medications    doxepin (SINEquan) 10 MG capsule     Sig: Take 1 capsule by mouth every night at bedtime.     Dispense:  90 capsule     Refill:  1     Reducing dose to 10 mg qhs. Thank you for the help. Please call with questions: [phone].       Return in about 6 weeks (around 5/8/2025).         This document has been electronically signed by Kai Elliott MD  March 27, 2025 11:27 EDT    Dictated Utilizing Dragon Dictation: Part of this note may be an electronic transcription/translation of spoken language to printed text using the Dragon Dictation System.

## 2025-05-08 ENCOUNTER — OFFICE VISIT (OUTPATIENT)
Dept: PSYCHIATRY | Facility: CLINIC | Age: 70
End: 2025-05-08
Payer: MEDICARE

## 2025-05-08 VITALS
DIASTOLIC BLOOD PRESSURE: 73 MMHG | WEIGHT: 151 LBS | HEART RATE: 69 BPM | BODY MASS INDEX: 26.76 KG/M2 | SYSTOLIC BLOOD PRESSURE: 145 MMHG

## 2025-05-08 DIAGNOSIS — F51.05 INSOMNIA DUE TO MENTAL CONDITION: Primary | ICD-10-CM

## 2025-05-08 DIAGNOSIS — F41.1 GENERALIZED ANXIETY DISORDER: ICD-10-CM

## 2025-05-08 DIAGNOSIS — F33.1 MAJOR DEPRESSIVE DISORDER, RECURRENT EPISODE, MODERATE: ICD-10-CM

## 2025-05-08 DIAGNOSIS — F31.81 HYPOMANIC BIPOLAR II DISORDER WITH FULL REMISSION: ICD-10-CM

## 2025-05-08 RX ORDER — LITHIUM CARBONATE 150 MG/1
300 CAPSULE ORAL
Qty: 180 CAPSULE | Refills: 3 | Status: SHIPPED | OUTPATIENT
Start: 2025-05-08

## 2025-05-08 NOTE — PROGRESS NOTES
"Subjective   Alissa Bruce is a 69 y.o. female who presents today for initial evaluation     Referring Provider:  No referring provider defined for this encounter.    Chief Complaint: Anxiety    History of Present Illness:     Chart review:   2025: no visits.  2025: ortho; DEXA shows osteoporosis.  2025: fam med x2; infusion, ortho; reassuring folate, ferritin, iron profile, thyroid studies, B12, vit D, HCV ab, Li 0.6.  2024: infusion; cr 1.15, Li 0.8 at 9 am (takes it at night).  24: multiple visits. Cr 1.34 (persistently high since ), benign mammogram.  no new.    Plannin2025: Stable, reduce doxepin. 6w  2025: Stable, stop wellbutrin. Next visit, reduce doxepin to 10 mg qhs. 6w  2024: Stable, well, no med changes. Reduce wellbutrin to once a day. 6w    Visits (Below):  \"Alissa\" and \"Feng\"   Most concerning symptom: anxiety  Consider auvelity, MAOIs  Get back to gardening      2025:   In person interview:  \"I am good.\"  No complaints  Tolerating being on less doxepin  Mood/Depression: stable, denies hypomanic s/sx  Anxiety: stable  Panic attacks: n  Energy: stable  Concentration: stable  Sleeping: stable  Eatin, 148, 142, 140 lbs  Refills: y  Substances: denies  Therapy: none  Medication compliant: yes  SE: n  No SI HI AVH.      2025:   In person interview:  \"Doing well.\"  No complaints  Tolerating being on less wellbutrin  Mood/Depression: stable, denies hypomanic s/sx  Anxiety: stable  Panic attacks: n  Energy: stable  Concentration: stable  Sleeping: stable  Eatin, 142, 140 lbs  Refills: y  Substances: denies  Therapy: none  Medication compliant: yes  SE: n  No SI HI AVH.      2025:   In person interview:  \"Doing well.\"  No complaints  Tolerating being on less wellbutrin  Mood/Depression: stable, denies hypomanic s/sx  Anxiety: stable  Panic attacks: n  Energy: stable  Concentration: stable  Sleeping: stable  Eatin, " "142, 140 lbs  Refills: y  Substances: denies  Therapy: none  Medication compliant: yes  SE: n  No SI HI AVH.      2024:   In person interview:  \"Everything's been good.\"  No complaints  Can I wean off bupropion?  Mood/Depression: stable, denies hypomanic s/sx  Anxiety: stable  Panic attacks: n  Energy: stable  Concentration: stable  Sleeping: stable  Eatin, 142, 140 lbs  Refills: y  Substances: denies  Therapy: none  Medication compliant: yes  SE: n  No SI HI AVH.    ...    22: Initial Chart review: Patient has a history of anxiety for several years, followed by [facility]ella during this time for medication management with little success.  Sent to us for a second opinion.  On Wellbutrin  twice daily, doxepin 25 nightly, Klonopin 1 mg p.o. 3 times daily as needed, but she takes about 1-1/2 tabs daily.  Nothing in PDMP.  No Care Everywhere.  Older labs from 2021 show reassuring CMP, thyroid studies, folate, iron studies, elevated lipids, reassuring B12, vitamin D, CBC.  No head imaging, EKG.    Psychotropic medication review includes Ativan, Vraylar, mirtazapine, Seroquel XR, Celexa, Cymbalta, Latuda, Adderall XR, Rexulti, Prozac, Abilify, Lamictal, BuSpar, Zoloft, Lexapro, Effexor.  There may have been others prior to [facility] management.    : In person.  Interview:  Chart review:   Would rule out ADHD.  Would rule out PTSD, consider guanfacine, clonidine, prazosin.  His/Her Story: \"I was working with Delfina Quevedo.\"  P15, G14  Had been working with Kamilah Dee also, and Dr. Wallace  I never even saw Delfina -- maybe 2 years  Talks over me, talkative, but interruptible, long silences too  One day I woke up and \"something wasn't right\" 5 years ago  Didn't like the ativan  6 mos I was perfectly fine: 2018 ( to )?  2017, multiple losses, when it all began  Discussed how klonopin is addictive  Everything that I do \"excites me\"  Most of the day I watch TV  Depression/Mood:  Depressed mood, " anhedonia, hopelessness, poor energy, poor concentration, weight loss or weight gain, psychomotor retardation or agitation, insomnia controlled on clonazepam.  Seasonal pattern:  Severity: Moderate  Duration: 5 years ago  Anxiety:  Uncontrolled worrying, muscle tension, fatigue, poor concentration, feeling on edge, irritability, insomnia.  Severity: Moderate to severe  Duration: 5 years ago  Panic attacks: n  Psych ROS: Positive for depression, anxiety.  Negative for psychosis and williams.  ADHD: Deferred  PTSD: Deferred  No SI HI AVH.  Substances: No  Therapy: Briefly  Medication compliant: y    Access to Firearms: Yes, locked away    PHQ-9 Depression Screening  PHQ-9 Total Score:      Little interest or pleasure in doing things?     Feeling down, depressed, or hopeless?     Trouble falling or staying asleep, or sleeping too much?     Feeling tired or having little energy?     Poor appetite or overeating?     Feeling bad about yourself - or that you are a failure or have let yourself or your family down?     Trouble concentrating on things, such as reading the newspaper or watching television?     Moving or speaking so slowly that other people could have noticed? Or the opposite - being so fidgety or restless that you have been moving around a lot more than usual?     Thoughts that you would be better off dead, or of hurting yourself in some way?     PHQ-9 Total Score       VITOR-7       Past Surgical History:  Past Surgical History:   Procedure Laterality Date    COLONOSCOPY  Age 52    CYST REMOVAL Right 1977    wrist    EYE SURGERY  2004    Lasik to correct vision    TUBAL ABDOMINAL LIGATION  [id number]       Problem List:  Patient Active Problem List   Diagnosis    Depression with anxiety    Right hip pain    Encounter for annual wellness exam in Medicare patient    Trochanteric bursitis of right hip    Elevated systolic blood pressure reading with diagnosis of hypertension    Stage 3a chronic kidney disease (CKD)     Localized osteoporosis without current pathological fracture       Allergy:   Allergies   Allergen Reactions    Sulfa Antibiotics Unknown - Low Severity and Other (See Comments)        Discontinued Medications:  Medications Discontinued During This Encounter   Medication Reason    lithium carbonate 150 MG capsule Reorder             Current Medications:   Current Outpatient Medications   Medication Sig Dispense Refill    cholecalciferol (Vitamin D, Cholecalciferol,) 25 MCG (1000 UT) tablet Take 2 tablets by mouth Daily.      doxepin (SINEquan) 10 MG capsule Take 1 capsule by mouth every night at bedtime. 90 capsule 1    lithium carbonate 150 MG capsule Take 2 capsules by mouth every night at bedtime. 180 capsule 3    Omega-3 Fatty Acids (Fish Oil) 1000 MG capsule delayed-release Take  by mouth.      polyethylene glycol (MIRALAX) 17 g packet Take 17 g by mouth Daily.       No current facility-administered medications for this visit.       Past Medical History:  Past Medical History:   Diagnosis Date    Anxiety     Depression     Elevated systolic blood pressure reading with diagnosis of hypertension 2024    Headache Years    Morning headaches    Osteopenia     Visual impairment     Dry eyes       Past Psychiatric History:  Began Treatment: a few years ago  Diagnoses: dep and anx, bipolar 2  Psychiatrist: Carmella  Therapist: yes briefly  Admission History:Denies  Medication Trials:    Multiple, can't remember inidivdual effects    Only one that helped me get back to my old self was zoloft during those six months    Self Harm: Denies  Suicide Attempts:Denies   Psychosis, Anxiety, Depression: Denies    Substance Abuse History:   Types:Denies all, including illicit  Withdrawal Symptoms:Denies  Longest Period Sober:Not Applicable   AA: Not applicable     Social History:  Martial Status:  Employed:No  Kids:Yes  House:Lives in a house   History: Denies    Social History     Socioeconomic History     Marital status:    Tobacco Use    Smoking status: Never     Passive exposure: Past    Smokeless tobacco: Never   Vaping Use    Vaping status: Never Used   Substance and Sexual Activity    Alcohol use: Never    Drug use: Never    Sexual activity: Yes     Partners: Male     Birth control/protection: None, Tubal ligation, Surgical     Comment: Cyst removal right wrist       Family History:   Suicide Attempts: Denies  Suicide Completions:Denies      Family History   Problem Relation Age of Onset    No Known Problems Father     Hyperlipidemia Mother          Bypass surgery in     Hypertension Mother     Arthritis Brother     Stroke Brother     Bipolar disorder Sister         [id number] ()    Mental illness Sister             Depression Sister             Miscarriages / Stillbirths Sister     No Known Problems Paternal Grandfather     No Known Problems Paternal Grandmother     No Known Problems Maternal Grandmother     No Known Problems Maternal Grandfather     No Known Problems Maternal Aunt     No Known Problems Maternal Uncle     No Known Problems Paternal Aunt     No Known Problems Paternal Uncle     No Known Problems Cousin     Cancer Brother             ADD / ADHD Neg Hx     Alcohol abuse Neg Hx     Anxiety disorder Neg Hx     Dementia Neg Hx     Drug abuse Neg Hx     OCD Neg Hx     Paranoid behavior Neg Hx     Schizophrenia Neg Hx     Seizures Neg Hx     Self-Injurious Behavior  Neg Hx     Suicide Attempts Neg Hx     Breast cancer Neg Hx     Uterine cancer Neg Hx     Ovarian cancer Neg Hx     Colon cancer Neg Hx        Developmental History:       Childhood: Denies Abuse  High School:Completed  College: denies    Mental Status Exam  Appearance  : groomed, good eye contact, normal street clothes  Behavior  : pleasant and cooperative  Motor  : No abnormal  Speech  : normal rhythm, rate, volume, tone, not hyperverbal, not pressured, normal prosidy  Mood  :  "\"Good\"  Affect  : euthymic, mood congruent, good variability  Thought Content  : negative suicidal ideations, negative homicidal ideations, negative obsessions  Perceptions  : negative auditory hallucinations, negative visual hallucinations  Thought Process  : not as tangential  Insight/Judgement  : Fair/fair  Cognition  : grossly intact  Attention   : intact      Review of Systems:  Review of Systems   Constitutional: Negative.  Negative for chills, diaphoresis, fatigue and fever.   HENT: Negative.  Negative for congestion, drooling and sore throat.    Eyes: Negative.  Negative for visual disturbance.   Respiratory: Negative.  Negative for cough, chest tightness and shortness of breath.    Cardiovascular: Negative.  Positive for leg swelling. Negative for chest pain and palpitations.   Gastrointestinal: Negative.  Negative for abdominal pain, diarrhea, nausea and vomiting.   Endocrine: Negative.  Negative for cold intolerance and heat intolerance.   Genitourinary: Negative.  Negative for difficulty urinating and dysuria.   Musculoskeletal: Negative.  Negative for joint swelling, myalgias and neck pain.   Skin: Negative.  Negative for rash.   Allergic/Immunologic: Negative.  Negative for immunocompromised state.   Neurological: Negative.  Negative for dizziness, seizures, speech difficulty, weakness, light-headedness, numbness and headaches.   Hematological: Negative.    Psychiatric/Behavioral: Negative.  Negative for agitation and sleep disturbance.        Physical Exam:  Physical Exam    Vital Signs:   /73   Pulse 69   Wt 68.5 kg (151 lb)   BMI 26.76 kg/m²      Lab Results:   Results Encounter on 02/05/2025   Component Date Value Ref Range Status    Cologuard 02/10/2025 Negative  Negative Final    Comment: NEGATIVE TEST RESULT. A negative Cologuard result indicates a low likelihood that a colorectal cancer (CRC) or advanced adenoma (adenomatous polyps with more advanced pre-malignant features)  is " present. The chance that a person with a negative Cologuard test has a colorectal cancer is less than 1 in 1500 (negative predictive value >99.9%) or has an  advanced adenoma is less than  5.3% (negative predictive value 94.7%). These data are based on a prospective cross-sectional study of 10,000 individuals at average risk for colorectal cancer who were screened with both Cologuard and colonoscopy. (Ruby DANIEL et al, N Engl J Med 2014;370(14):1450-2750) The normal value (reference range) for this assay is negative.    COLOGUARD RE-SCREENING RECOMMENDATION: Periodic colorectal cancer screening is an important part of preventive healthcare for asymptomatic individuals at average risk for colorectal cancer.  Following a negative Cologuard result, the American Cancer Society and U.S.                            Multi-Society Task Force screening guidelines recommend a Cologuard re-screening interval of 3 years.   References: American Cancer Society Guideline for Colorectal Cancer Screening: https://www.cancer.org/cancer/colon-rectal-cancer/yoeitymzj-tnjawcflv-ozpsfki/acs-recommendations.html.; Mikel ODEN, Keri RAMOS, Luz Elena ROJASK, Colorectal Cancer Screening: Recommendations for Physicians and Patients from the U.S. Multi-Society Task Force on Colorectal Cancer Screening , Am J Gastroenterology 2017; 112:7349-8389.    TEST DESCRIPTION: Composite algorithmic analysis of stool DNA-biomarkers with hemoglobin immunoassay.   Quantitative values of individual biomarkers are not reportable and are not associated with individual biomarker result reference ranges. Cologuard is intended for colorectal cancer screening of adults of either sex, 45 years or older, who are at average-risk for colorectal cancer (CRC). Cologuard has been approved for use by the U.S. FDA. The performance of Cologuard was                            established in a cross sectional study of average-risk adults aged 50-84. Cologuard performance in patients  ages 45 to 49 years was estimated by sub-group analysis of near-age groups. Colonoscopies performed for a positive result may find as the most clinically significant lesion: colorectal cancer [4.0%], advanced adenoma (including sessile serrated polyps greater than or equal to 1cm diameter) [20%] or non- advanced adenoma [31%]; or no colorectal neoplasia [45%]. These estimates are derived from a prospective cross-sectional screening study of 10,000 individuals at average risk for colorectal cancer who were screened with both Cologuard and colonoscopy. (Ruby DANIEL et al, N Engl J Med 2014;370(14):0786-7893.) Cologuard may produce a false negative or false positive result (no colorectal cancer or precancerous polyp present at colonoscopy follow up). A negative Cologuard test result does not guarantee the absence of CRC or advanced adenoma (pre-cancer). The current Cologuard                            screening interval is every 3 years. (American Cancer Society and U.S. Multi-Society Task Force). Cologuard performance data in a 10,000 patient pivotal study using colonoscopy as the reference method can be accessed at the following location: www.iChange/results. Additional description of the Cologuard test process, warnings and precautions can be found at www.[employer].[place].     Lab on 01/15/2025   Component Date Value Ref Range Status    Vitamin B-12 01/15/2025 483  211 - 946 pg/mL Final    Lithium 01/15/2025 0.6  0.6 - 1.2 mmol/L Final    WBC 01/15/2025 6.10  3.40 - 10.80 10*3/mm3 Final    RBC 01/15/2025 3.78  3.77 - 5.28 10*6/mm3 Final    Hemoglobin 01/15/2025 11.3 (L)  12.0 - 15.9 g/dL Final    Hematocrit 01/15/2025 35.9  34.0 - 46.6 % Final    MCV 01/15/2025 95.0  79.0 - 97.0 fL Final    MCH 01/15/2025 29.9  26.6 - 33.0 pg Final    MCHC 01/15/2025 31.5  31.5 - 35.7 g/dL Final    RDW 01/15/2025 12.2 (L)  12.3 - 15.4 % Final    RDW-SD 01/15/2025 42.6  37.0 - 54.0 fl Final    MPV 01/15/2025 13.0 (H)  6.0 - 12.0  fL Final    Platelets 01/15/2025 190  140 - 450 10*3/mm3 Final    Glucose 01/15/2025 91  65 - 99 mg/dL Final    BUN 01/15/2025 39 (H)  8 - 23 mg/dL Final    Creatinine 01/15/2025 1.27 (H)  0.57 - 1.00 mg/dL Final    Sodium 01/15/2025 140  136 - 145 mmol/L Final    Potassium 01/15/2025 4.5  3.5 - 5.2 mmol/L Final    Chloride 01/15/2025 106  98 - 107 mmol/L Final    CO2 01/15/2025 24.2  22.0 - 29.0 mmol/L Final    Calcium 01/15/2025 9.8  8.6 - 10.5 mg/dL Final    Total Protein 01/15/2025 7.2  6.0 - 8.5 g/dL Final    Albumin 01/15/2025 4.2  3.5 - 5.2 g/dL Final    ALT (SGPT) 01/15/2025 27  1 - 33 U/L Final    AST (SGOT) 01/15/2025 39 (H)  1 - 32 U/L Final    Alkaline Phosphatase 01/15/2025 71  39 - 117 U/L Final    Total Bilirubin 01/15/2025 0.3  0.0 - 1.2 mg/dL Final    Globulin 01/15/2025 3.0  gm/dL Final    A/G Ratio 01/15/2025 1.4  g/dL Final    BUN/Creatinine Ratio 01/15/2025 30.7 (H)  7.0 - 25.0 Final    Anion Gap 01/15/2025 9.8  5.0 - 15.0 mmol/L Final    eGFR 01/15/2025 46.2 (L)  >60.0 mL/min/1.73 Final    Total Cholesterol 01/15/2025 219 (H)  0 - 200 mg/dL Final    Triglycerides 01/15/2025 64  0 - 150 mg/dL Final    HDL Cholesterol 01/15/2025 81 (H)  40 - 60 mg/dL Final    LDL Cholesterol  01/15/2025 127 (H)  0 - 100 mg/dL Final    VLDL Cholesterol 01/15/2025 11  5 - 40 mg/dL Final    LDL/HDL Ratio 01/15/2025 1.55   Final    TSH 01/15/2025 2.370  0.270 - 4.200 uIU/mL Final    Free T4 01/15/2025 1.07  0.92 - 1.68 ng/dL Final    Folate 01/15/2025 7.20  4.78 - 24.20 ng/mL Final    Iron 01/15/2025 102  37 - 145 mcg/dL Final    Iron Saturation (TSAT) 01/15/2025 26  20 - 50 % Final    Transferrin 01/15/2025 259  200 - 360 mg/dL Final    TIBC 01/15/2025 386  298 - 536 mcg/dL Final    Ferritin 01/15/2025 101.00  13.00 - 150.00 ng/mL Final    25 Hydroxy, Vitamin D 01/15/2025 30.6  30.0 - 100.0 ng/ml Final    Hepatitis C Ab 01/15/2025 Non-Reactive  Non-Reactive Final       EKG Results:  No orders to display  "      Imaging Results:  No Images in the past 120 days found..      Assessment & Plan   Diagnoses and all orders for this visit:    1. 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  Dispense: 180 capsule; Refill: 3    2. Generalized anxiety disorder  -     lithium carbonate 150 MG capsule; Take 2 capsules by mouth every night at bedtime.  Dispense: 180 capsule; Refill: 3    3. Hypomanic bipolar II disorder with full remission    4. Major depressive disorder, recurrent episode, moderate  -     lithium carbonate 150 MG capsule; Take 2 capsules by mouth every night at bedtime.  Dispense: 180 capsule; Refill: 3        Visit Diagnoses:    ICD-10-CM ICD-9-CM   1. Insomnia due to mental condition  F51.05 300.9     327.02   2. Generalized anxiety disorder  F41.1 300.02   3. Hypomanic bipolar II disorder with full remission  F31.81 296.89   4. Major depressive disorder, recurrent episode, moderate  F33.1 296.32     05/08/2025: Doing well.  Trial of discontinuing doxepin.  Even consider discontinuing lithium the patient could be stable even off of this medication, with close vigilance.  6 weeks    03/27/2025: Stable, reduce doxepin. 6w    02/13/2025: Stable, stop wellbutrin. Next visit, reduce doxepin to 10 mg qhs. 6w    12/30/2024: Stable, well, no med changes. Reduce wellbutrin to once a day. 6w    6/25/24: Stable, well, no changes. 6m    12/29: Stable, well, watch hair loss (can occur in up  to 10% taking lithium), may need to start zinc/selenium supplements. 6m    6/29: stable, well. No changes. If elevated bps at home over the next 30 days, pt to call me and pcp. 6 mos    4/27: Stable, well. Not even taking clonazepam.     3/31: Much improved, \"best I've been in 5 years.\" No changes now, low threshold to increase lithium to 450 mg. May need neuro workup in the past due to clear bipolar II late-emerging hypomania.  Watch sleeping " closely, may need to increase doxepin.     3/7: [facility] today. Longer appnt. Seems more hypomanic now. Increase lithium, watch dry lip. SE with every med?     2/20: [facility] at next visit, then neuropsych testing. Seems hypomanic, improved but not tolerating abilify. Stop abilify and start lithium qhs.     1/17: Increase abilify, a bit more linear. Answered questions more accurately. Appears bipolar hypomanic.     12/22: Very talkative and tangential, hard to get anything out of the patient.  Trouble with answering questions, avoids saying yes or no to yes or no questions.  Start Abilify and see back in 4 weeks.  Request records from [facility].  Unclear what the diagnosis is but it could be bipolar.    PLAN:  Safety: No acute safety concerns  Therapy: None  Risk Assessment: Risk of self-harm acutely is moderate.  Risk factors include anxiety disorder, mood disorder, access to weapons and recent psychosocial stressors (pandemic). Protective factors include no family history, no present SI, no history of suicide attempts or self-harm in the past, minimal AODA, healthcare seeking, future orientation, willingness to engage in care.  Risk of self-harm chronically is also moderate, but could be further elevated in the event of treatment noncompliance and/or AODA.  Meds:  CONTINUE lithium 300 mg nightly. Risks, benefits, alternatives discussed with patient including ataxia, sedation, dizziness/falls risk, dysarthria, tremor, possible nephrogenic diabetes insipidus, diarrhea, nausea, weight gain, rash, and possible hypothyroid goiter.  Patient also counseled to monitor hydration status and sodium intake as changes can result in rapidly increasing, even toxic, levels of lithium. Patient also counseled regarding the use of diuretics and their ability to increase lithium levels. After discussion of these risks and benefits, the patient voiced understanding and agreed to proceed.  STOP Doxepin 10 mg nightly. Risks, benefits, alternatives  discussed with patient including dizziness/falls risk, sedation, GI upset, constipation, possible blurred vision.  After discussion of these risks and benefits, the patient voiced understanding and agreed to proceed.  S/P:  Wellbutrin  mg QDAY. (1/25, pt's wish)  clonazepam 1 mg tid prn. (Not needed 3/25)  Abilify 2 to 5 mg nightly. Akathisia.   Labs: None    Patient screened positive for depression based on a PHQ-9 score of  on . Follow-up recommendations include: Prescribed antidepressant medication treatment and Suicide Risk Assessment performed.           TREATMENT PLAN/GOALS: Continue supportive psychotherapy efforts and medications as indicated. Treatment and medication options discussed during today's visit. Patient acknowledged and verbally consented to continue with current treatment plan and was educated on the importance of compliance with treatment and follow-up appointments.    MEDICATION ISSUES:  BRYNN reviewed as expected.  Discussed medication options and treatment plan of prescribed medication as well as the risks, benefits, and side effects including potential falls, possible impaired driving and metabolic adversities among others. Patient is agreeable to call the office with any worsening of symptoms or onset of side effects. Patient is agreeable to call 911 or go to the nearest ER should he/she begin having SI/HI. No medication side effects or related complaints today.     MEDS ORDERED DURING VISIT:  New Medications Ordered This Visit   Medications    lithium carbonate 150 MG capsule     Sig: Take 2 capsules by mouth every night at bedtime.     Dispense:  180 capsule     Refill:  3       Return in about 6 weeks (around 6/19/2025).         This document has been electronically signed by Kai Elliott MD  May 8, 2025 10:19 EDT    Dictated Utilizing Dragon Dictation: Part of this note may be an electronic transcription/translation of spoken language to printed text using the Dragon Dictation  System.

## 2025-06-11 ENCOUNTER — TRANSCRIBE ORDERS (OUTPATIENT)
Dept: ADMINISTRATIVE | Facility: HOSPITAL | Age: 70
End: 2025-06-11
Payer: MEDICARE

## 2025-06-11 DIAGNOSIS — Z12.31 ENCOUNTER FOR SCREENING MAMMOGRAM FOR BREAST CANCER: Primary | ICD-10-CM

## 2025-06-26 ENCOUNTER — OFFICE VISIT (OUTPATIENT)
Dept: PSYCHIATRY | Facility: CLINIC | Age: 70
End: 2025-06-26
Payer: MEDICARE

## 2025-06-26 ENCOUNTER — HOSPITAL ENCOUNTER (OUTPATIENT)
Dept: MAMMOGRAPHY | Facility: HOSPITAL | Age: 70
Discharge: HOME | End: 2025-06-26
Admitting: OBSTETRICS & GYNECOLOGY
Payer: MEDICARE

## 2025-06-26 VITALS
BODY MASS INDEX: 26.33 KG/M2 | DIASTOLIC BLOOD PRESSURE: 65 MMHG | SYSTOLIC BLOOD PRESSURE: 136 MMHG | WEIGHT: 148.6 LBS | HEART RATE: 76 BPM | HEIGHT: 63 IN

## 2025-06-26 DIAGNOSIS — Z12.31 ENCOUNTER FOR SCREENING MAMMOGRAM FOR BREAST CANCER: ICD-10-CM

## 2025-06-26 DIAGNOSIS — F41.1 GENERALIZED ANXIETY DISORDER: ICD-10-CM

## 2025-06-26 DIAGNOSIS — F51.05 INSOMNIA DUE TO MENTAL CONDITION: Primary | ICD-10-CM

## 2025-06-26 DIAGNOSIS — F31.81 HYPOMANIC BIPOLAR II DISORDER WITH FULL REMISSION: ICD-10-CM

## 2025-06-26 PROCEDURE — 77063 BREAST TOMOSYNTHESIS BI: CPT

## 2025-06-26 PROCEDURE — 77067 SCR MAMMO BI INCL CAD: CPT

## 2025-06-26 NOTE — PROGRESS NOTES
"Subjective   Alissa Bruce is a 69 y.o. female who presents today for initial evaluation     Referring Provider:  No referring provider defined for this encounter.    Chief Complaint: Anxiety    History of Present Illness:     Chart review:   2025: no visits.  2025: no visits.  2025: ortho; DEXA shows osteoporosis.  2025: fam med x2; infusion, ortho; reassuring folate, ferritin, iron profile, thyroid studies, B12, vit D, HCV ab, Li 0.6.  2024: infusion; cr 1.15, Li 0.8 at 9 am (takes it at night).  24: multiple visits. Cr 1.34 (persistently high since ), benign mammogram.  no new.    Visits (Below):  \"Alissa\" and \"Feng\"   Most concerning symptom: anxiety  Consider auvelity, MAOIs  Get back to gardening      2025:   In person interview:  \"Doing good.\"  No complaints off the doxepin  MH stable  Mood/Depression: stable, denies hypomanic s/sx  Anxiety: stable  Panic attacks: stable  Energy: stable  Concentration: stable  Sleeping: stable  Eatin, 151, 148, 142, 140 lbs  Refills: y  Substances: denies  Therapy: none  Medication compliant: yes  SE: n  No SI HI AVH.      2025:   In person interview:  \"I am good.\"  No complaints  Tolerating being on less doxepin  Mood/Depression: stable, denies hypomanic s/sx  Anxiety: stable  Panic attacks: n  Energy: stable  Concentration: stable  Sleeping: stable  Eatin, 148, 142, 140 lbs  Refills: y  Substances: denies  Therapy: none  Medication compliant: yes  SE: n  No SI HI AVH.      2025:   In person interview:  \"Doing well.\"  No complaints  Tolerating being on less wellbutrin  Mood/Depression: stable, denies hypomanic s/sx  Anxiety: stable  Panic attacks: n  Energy: stable  Concentration: stable  Sleeping: stable  Eatin, 142, 140 lbs  Refills: y  Substances: denies  Therapy: none  Medication compliant: yes  SE: n  No SI HI AVH.      2025:   In person interview:  \"Doing well.\"  No " "complaints  Tolerating being on less wellbutrin  Mood/Depression: stable, denies hypomanic s/sx  Anxiety: stable  Panic attacks: n  Energy: stable  Concentration: stable  Sleeping: stable  Eatin, 142, 140 lbs  Refills: y  Substances: denies  Therapy: none  Medication compliant: yes  SE: n  No SI HI AVH.      2024:   In person interview:  \"Everything's been good.\"  No complaints  Can I wean off bupropion?  Mood/Depression: stable, denies hypomanic s/sx  Anxiety: stable  Panic attacks: n  Energy: stable  Concentration: stable  Sleeping: stable  Eatin, 142, 140 lbs  Refills: y  Substances: denies  Therapy: none  Medication compliant: yes  SE: n  No SI HI AVH.    ...    22: Initial Chart review: Patient has a history of anxiety for several years, followed by Carmella during this time for medication management with little success.  Sent to us for a second opinion.  On Wellbutrin  twice daily, doxepin 25 nightly, Klonopin 1 mg p.o. 3 times daily as needed, but she takes about 1-1/2 tabs daily.  Nothing in PDMP.  No Care Everywhere.  Older labs from 2021 show reassuring CMP, thyroid studies, folate, iron studies, elevated lipids, reassuring B12, vitamin D, CBC.  No head imaging, EKG.    Psychotropic medication review includes Ativan, Vraylar, mirtazapine, Seroquel XR, Celexa, Cymbalta, Latuda, Adderall XR, Rexulti, Prozac, Abilify, Lamictal, BuSpar, Zoloft, Lexapro, Effexor.  There may have been others prior to [facility] management.    : In person.  Interview:  Chart review:   Would rule out ADHD.  Would rule out PTSD, consider guanfacine, clonidine, prazosin.  His/Her Story: \"I was working with Delfina Quevedo.\"  P15, G14  Had been working with Kamilah Dee also, and Dr. Wallace  I never even saw Delfina -- maybe 2 years  Talks over me, talkative, but interruptible, long silences too  One day I woke up and \"something wasn't right\" 5 years ago  Didn't like the ativan  6 mos I was perfectly " "fine: 2018 (jan to June)?  2017, multiple losses, when it all began  Discussed how klonopin is addictive  Everything that I do \"excites me\"  Most of the day I watch TV  Depression/Mood:  Depressed mood, anhedonia, hopelessness, poor energy, poor concentration, weight loss or weight gain, psychomotor retardation or agitation, insomnia controlled on clonazepam.  Seasonal pattern:  Severity: Moderate  Duration: 5 years ago  Anxiety:  Uncontrolled worrying, muscle tension, fatigue, poor concentration, feeling on edge, irritability, insomnia.  Severity: Moderate to severe  Duration: 5 years ago  Panic attacks: n  Psych ROS: Positive for depression, anxiety.  Negative for psychosis and williams.  ADHD: Deferred  PTSD: Deferred  No SI HI AVH.  Substances: No  Therapy: Briefly  Medication compliant: y    Access to Firearms: Yes, locked away    PHQ-9 Depression Screening  PHQ-9 Total Score:      Little interest or pleasure in doing things?     Feeling down, depressed, or hopeless?     Trouble falling or staying asleep, or sleeping too much?     Feeling tired or having little energy?     Poor appetite or overeating?     Feeling bad about yourself - or that you are a failure or have let yourself or your family down?     Trouble concentrating on things, such as reading the newspaper or watching television?     Moving or speaking so slowly that other people could have noticed? Or the opposite - being so fidgety or restless that you have been moving around a lot more than usual?     Thoughts that you would be better off dead, or of hurting yourself in some way?     PHQ-9 Total Score       VITOR-7       Past Surgical History:  Past Surgical History:   Procedure Laterality Date    COLONOSCOPY  Age 52    CYST REMOVAL Right 1977    wrist    EYE SURGERY  2004    Lasik to correct vision    TUBAL ABDOMINAL LIGATION  [id number]       Problem List:  Patient Active Problem List   Diagnosis    Depression with anxiety    Right hip pain    Encounter " for annual wellness exam in Medicare patient    Trochanteric bursitis of right hip    Elevated systolic blood pressure reading with diagnosis of hypertension    Stage 3a chronic kidney disease (CKD)    Localized osteoporosis without current pathological fracture       Allergy:   Allergies   Allergen Reactions    Sulfa Antibiotics Unknown - Low Severity and Other (See Comments)        Discontinued Medications:  Medications Discontinued During This Encounter   Medication Reason    doxepin (SINEquan) 10 MG capsule *Therapy completed               Current Medications:   Current Outpatient Medications   Medication Sig Dispense Refill    cholecalciferol (Vitamin D, Cholecalciferol,) 25 MCG (1000 UT) tablet Take 2 tablets by mouth Daily.      lithium carbonate 150 MG capsule Take 2 capsules by mouth every night at bedtime. 180 capsule 3    Omega-3 Fatty Acids (Fish Oil) 1000 MG capsule delayed-release Take  by mouth.      polyethylene glycol (MIRALAX) 17 g packet Take 17 g by mouth Daily.       No current facility-administered medications for this visit.       Past Medical History:  Past Medical History:   Diagnosis Date    Anxiety     Depression     Elevated systolic blood pressure reading with diagnosis of hypertension 2024    Headache Years    Morning headaches    Osteopenia     Visual impairment     Dry eyes       Past Psychiatric History:  Began Treatment: a few years ago  Diagnoses: dep and anx, bipolar 2  Psychiatrist: Carmella  Therapist: yes briefly  Admission History:Denies  Medication Trials:    Multiple, can't remember inidivdual effects    Only one that helped me get back to my old self was zoloft during those six months    Self Harm: Denies  Suicide Attempts:Denies   Psychosis, Anxiety, Depression: Denies    Substance Abuse History:   Types:Denies all, including illicit  Withdrawal Symptoms:Denies  Longest Period Sober:Not Applicable   AA: Not applicable     Social History:  Martial  Status:  Employed:No  Kids:Yes  House:Lives in a house   History: Denies    Social History     Socioeconomic History    Marital status:    Tobacco Use    Smoking status: Never     Passive exposure: Past    Smokeless tobacco: Never   Vaping Use    Vaping status: Never Used   Substance and Sexual Activity    Alcohol use: Never    Drug use: Never    Sexual activity: Yes     Partners: Male     Birth control/protection: None, Tubal ligation, Surgical     Comment: Cyst removal right wrist       Family History:   Suicide Attempts: Denies  Suicide Completions:Denies      Family History   Problem Relation Age of Onset    No Known Problems Father     Hyperlipidemia Mother          Bypass surgery in     Hypertension Mother     Arthritis Brother     Stroke Brother     Bipolar disorder Sister         [id number] ()    Mental illness Sister             Depression Sister             Miscarriages / Stillbirths Sister     No Known Problems Paternal Grandfather     No Known Problems Paternal Grandmother     No Known Problems Maternal Grandmother     No Known Problems Maternal Grandfather     No Known Problems Maternal Aunt     No Known Problems Maternal Uncle     No Known Problems Paternal Aunt     No Known Problems Paternal Uncle     No Known Problems Cousin     Cancer Brother             ADD / ADHD Neg Hx     Alcohol abuse Neg Hx     Anxiety disorder Neg Hx     Dementia Neg Hx     Drug abuse Neg Hx     OCD Neg Hx     Paranoid behavior Neg Hx     Schizophrenia Neg Hx     Seizures Neg Hx     Self-Injurious Behavior  Neg Hx     Suicide Attempts Neg Hx     Breast cancer Neg Hx     Uterine cancer Neg Hx     Ovarian cancer Neg Hx     Colon cancer Neg Hx        Developmental History:       Childhood: Denies Abuse  High School:Completed  College: denies    Mental Status Exam  Appearance  : groomed, good eye contact, normal street clothes  Behavior  : pleasant and  "cooperative  Motor  : No abnormal  Speech  : normal rhythm, rate, volume, tone, not hyperverbal, not pressured, normal prosidy  Mood  : \"Still doing good\"  Affect  : euthymic, mood congruent, good variability  Thought Content  : negative suicidal ideations, negative homicidal ideations, negative obsessions  Perceptions  : negative auditory hallucinations, negative visual hallucinations  Thought Process  : not as tangential  Insight/Judgement  : Fair/fair  Cognition  : grossly intact  Attention   : intact      Review of Systems:  Review of Systems   Constitutional: Negative.  Negative for diaphoresis and fatigue.   HENT: Negative.  Negative for drooling.    Eyes: Negative.  Negative for visual disturbance.   Respiratory: Negative.  Negative for cough and shortness of breath.    Cardiovascular: Negative.  Negative for chest pain, palpitations and leg swelling.   Gastrointestinal: Negative.  Negative for nausea and vomiting.   Endocrine: Negative.  Negative for cold intolerance and heat intolerance.   Genitourinary: Negative.  Negative for difficulty urinating.   Musculoskeletal: Negative.  Negative for joint swelling.   Skin: Negative.    Allergic/Immunologic: Negative.  Negative for immunocompromised state.   Neurological: Negative.  Negative for dizziness, seizures, speech difficulty and numbness.       Physical Exam:  Physical Exam    Vital Signs:   /65   Pulse 76   Ht 160 cm (63\")   Wt 67.4 kg (148 lb 9.6 oz)   BMI 26.32 kg/m²      Lab Results:   Results Encounter on 02/05/2025   Component Date Value Ref Range Status    Cologuard 02/10/2025 Negative  Negative Final    Comment: NEGATIVE TEST RESULT. A negative Cologuard result indicates a low likelihood that a colorectal cancer (CRC) or advanced adenoma (adenomatous polyps with more advanced pre-malignant features)  is present. The chance that a person with a negative Cologuard test has a colorectal cancer is less than 1 in 1500 (negative predictive value " >99.9%) or has an  advanced adenoma is less than  5.3% (negative predictive value 94.7%). These data are based on a prospective cross-sectional study of 10,000 individuals at average risk for colorectal cancer who were screened with both Cologuard and colonoscopy. (Ruby Herrera al, N Engl J Med 2014;370(14):9933-0838) The normal value (reference range) for this assay is negative.    COLOGUARD RE-SCREENING RECOMMENDATION: Periodic colorectal cancer screening is an important part of preventive healthcare for asymptomatic individuals at average risk for colorectal cancer.  Following a negative Cologuard result, the American Cancer Society and U.S.                            Multi-Society Task Force screening guidelines recommend a Cologuard re-screening interval of 3 years.   References: American Cancer Society Guideline for Colorectal Cancer Screening: https://www.cancer.org/cancer/colon-rectal-cancer/gnelqtsjb-glbrllxlb-ozqhiyh/acs-recommendations.html.; Mikel DK, Keri RAMOS, Luz Elena ROJASK, Colorectal Cancer Screening: Recommendations for Physicians and Patients from the U.S. Multi-Society Task Force on Colorectal Cancer Screening , Am J Gastroenterology 2017; 112:4224-0479.    TEST DESCRIPTION: Composite algorithmic analysis of stool DNA-biomarkers with hemoglobin immunoassay.   Quantitative values of individual biomarkers are not reportable and are not associated with individual biomarker result reference ranges. Cologuard is intended for colorectal cancer screening of adults of either sex, 45 years or older, who are at average-risk for colorectal cancer (CRC). Cologuard has been approved for use by the U.S. FDA. The performance of Cologuard was                            established in a cross sectional study of average-risk adults aged 50-84. Cologuard performance in patients ages 45 to 49 years was estimated by sub-group analysis of near-age groups. Colonoscopies performed for a positive result may find as the  most clinically significant lesion: colorectal cancer [4.0%], advanced adenoma (including sessile serrated polyps greater than or equal to 1cm diameter) [20%] or non- advanced adenoma [31%]; or no colorectal neoplasia [45%]. These estimates are derived from a prospective cross-sectional screening study of 10,000 individuals at average risk for colorectal cancer who were screened with both Cologuard and colonoscopy. (Ruby DANIEL et al, N Engl J Med 2014;370(14):2401-3217.) Cologuard may produce a false negative or false positive result (no colorectal cancer or precancerous polyp present at colonoscopy follow up). A negative Cologuard test result does not guarantee the absence of CRC or advanced adenoma (pre-cancer). The current Cologuard                            screening interval is every 3 years. (American Cancer Society and U.S. Multi-Society Task Force). Cologuard performance data in a 10,000 patient pivotal study using colonoscopy as the reference method can be accessed at the following location: www.[employer]/results. Additional description of the Cologuard test process, warnings and precautions can be found at www.cologuard.com.     Lab on 01/15/2025   Component Date Value Ref Range Status    Vitamin B-12 01/15/2025 483  211 - 946 pg/mL Final    Lithium 01/15/2025 0.6  0.6 - 1.2 mmol/L Final    WBC 01/15/2025 6.10  3.40 - 10.80 10*3/mm3 Final    RBC 01/15/2025 3.78  3.77 - 5.28 10*6/mm3 Final    Hemoglobin 01/15/2025 11.3 (L)  12.0 - 15.9 g/dL Final    Hematocrit 01/15/2025 35.9  34.0 - 46.6 % Final    MCV 01/15/2025 95.0  79.0 - 97.0 fL Final    MCH 01/15/2025 29.9  26.6 - 33.0 pg Final    MCHC 01/15/2025 31.5  31.5 - 35.7 g/dL Final    RDW 01/15/2025 12.2 (L)  12.3 - 15.4 % Final    RDW-SD 01/15/2025 42.6  37.0 - 54.0 fl Final    MPV 01/15/2025 13.0 (H)  6.0 - 12.0 fL Final    Platelets 01/15/2025 190  140 - 450 10*3/mm3 Final    Glucose 01/15/2025 91  65 - 99 mg/dL Final    BUN 01/15/2025 39 (H)  8 -  23 mg/dL Final    Creatinine 01/15/2025 1.27 (H)  0.57 - 1.00 mg/dL Final    Sodium 01/15/2025 140  136 - 145 mmol/L Final    Potassium 01/15/2025 4.5  3.5 - 5.2 mmol/L Final    Chloride 01/15/2025 106  98 - 107 mmol/L Final    CO2 01/15/2025 24.2  22.0 - 29.0 mmol/L Final    Calcium 01/15/2025 9.8  8.6 - 10.5 mg/dL Final    Total Protein 01/15/2025 7.2  6.0 - 8.5 g/dL Final    Albumin 01/15/2025 4.2  3.5 - 5.2 g/dL Final    ALT (SGPT) 01/15/2025 27  1 - 33 U/L Final    AST (SGOT) 01/15/2025 39 (H)  1 - 32 U/L Final    Alkaline Phosphatase 01/15/2025 71  39 - 117 U/L Final    Total Bilirubin 01/15/2025 0.3  0.0 - 1.2 mg/dL Final    Globulin 01/15/2025 3.0  gm/dL Final    A/G Ratio 01/15/2025 1.4  g/dL Final    BUN/Creatinine Ratio 01/15/2025 30.7 (H)  7.0 - 25.0 Final    Anion Gap 01/15/2025 9.8  5.0 - 15.0 mmol/L Final    eGFR 01/15/2025 46.2 (L)  >60.0 mL/min/1.73 Final    Total Cholesterol 01/15/2025 219 (H)  0 - 200 mg/dL Final    Triglycerides 01/15/2025 64  0 - 150 mg/dL Final    HDL Cholesterol 01/15/2025 81 (H)  40 - 60 mg/dL Final    LDL Cholesterol  01/15/2025 127 (H)  0 - 100 mg/dL Final    VLDL Cholesterol 01/15/2025 11  5 - 40 mg/dL Final    LDL/HDL Ratio 01/15/2025 1.55   Final    TSH 01/15/2025 2.370  0.270 - 4.200 uIU/mL Final    Free T4 01/15/2025 1.07  0.92 - 1.68 ng/dL Final    Folate 01/15/2025 7.20  4.78 - 24.20 ng/mL Final    Iron 01/15/2025 102  37 - 145 mcg/dL Final    Iron Saturation (TSAT) 01/15/2025 26  20 - 50 % Final    Transferrin 01/15/2025 259  200 - 360 mg/dL Final    TIBC 01/15/2025 386  298 - 536 mcg/dL Final    Ferritin 01/15/2025 101.00  13.00 - 150.00 ng/mL Final    25 Hydroxy, Vitamin D 01/15/2025 30.6  30.0 - 100.0 ng/ml Final    Hepatitis C Ab 01/15/2025 Non-Reactive  Non-Reactive Final       EKG Results:  No orders to display       Imaging Results:  No Images in the past 120 days found..      Assessment & Plan   Diagnoses and all orders for this visit:    1. Insomnia due to  "mental condition (Primary)    2. Generalized anxiety disorder    3. Hypomanic bipolar II disorder with full remission        Visit Diagnoses:    ICD-10-CM ICD-9-CM   1. Insomnia due to mental condition  F51.05 300.9     327.02   2. Generalized anxiety disorder  F41.1 300.02   3. Hypomanic bipolar II disorder with full remission  F31.81 296.89     06/26/2025: Now only on lithium. Plan is to continue for a year, then reduce lithium to 150 mg for 6 mos if continued stability. If still stable, could even consider discontinuing. 6w in perpetuity for now    05/08/2025: Doing well.  Trial of discontinuing doxepin.  Even consider discontinuing lithium the patient could be stable even off of this medication, with close vigilance.  6 weeks    03/27/2025: Stable, reduce doxepin. 6w    02/13/2025: Stable, stop wellbutrin. Next visit, reduce doxepin to 10 mg qhs. 6w    12/30/2024: Stable, well, no med changes. Reduce wellbutrin to once a day. 6w    6/25/24: Stable, well, no changes. 6m    12/29: Stable, well, watch hair loss (can occur in up  to 10% taking lithium), may need to start zinc/selenium supplements. 6m    6/29: stable, well. No changes. If elevated bps at home over the next 30 days, pt to call me and pcp. 6 mos    4/27: Stable, well. Not even taking clonazepam.     3/31: Much improved, \"best I've been in 5 years.\" No changes now, low threshold to increase lithium to 450 mg. May need neuro workup in the past due to clear bipolar II late-emerging hypomania.  Watch sleeping closely, may need to increase doxepin.     3/7: Sabine today. Longer appnt. Seems more hypomanic now. Increase lithium, watch dry lip. SE with every med?     2/20: Sabine at next visit, then neuropsych testing. Seems hypomanic, improved but not tolerating abilify. Stop abilify and start lithium qhs.     1/17: Increase abilify, a bit more linear. Answered questions more accurately. Appears bipolar hypomanic.     12/22: Very talkative and tangential, hard " to get anything out of the patient.  Trouble with answering questions, avoids saying yes or no to yes or no questions.  Start Abilify and see back in 4 weeks.  Request [facility].  Unclear what the diagnosis is but it could be bipolar.    PLAN:  Safety: No acute safety concerns  Therapy: None  Risk Assessment: Risk of self-harm acutely is moderate.  Risk factors include anxiety disorder, mood disorder, access to weapons and recent psychosocial stressors (pandemic). Protective factors include no family history, no present SI, no history of suicide attempts or self-harm in the past, minimal AODA, healthcare seeking, future orientation, willingness to engage in care.  Risk of self-harm chronically is also moderate, but could be further elevated in the event of treatment noncompliance and/or AODA.  Meds:  CONTINUE lithium 300 mg nightly. Risks, benefits, alternatives discussed with patient including ataxia, sedation, dizziness/falls risk, dysarthria, tremor, possible nephrogenic diabetes insipidus, diarrhea, nausea, weight gain, rash, and possible hypothyroid goiter.  Patient also counseled to monitor hydration status and sodium intake as changes can result in rapidly increasing, even toxic, levels of lithium. Patient also counseled regarding the use of diuretics and their ability to increase lithium levels. After discussion of these risks and benefits, the patient voiced understanding and agreed to proceed.  STOP Doxepin 10 mg nightly. Risks, benefits, alternatives discussed with patient including dizziness/falls risk, sedation, GI upset, constipation, possible blurred vision.  After discussion of these risks and benefits, the patient voiced understanding and agreed to proceed.  S/P:  Wellbutrin  mg QDAY. (1/25, pt's wish)  clonazepam 1 mg tid prn. (Not needed 3/25)  Abilify 2 to 5 mg nightly. Akathisia.   Labs: None    Patient screened positive for depression based on a PHQ-9 score of  on . Follow-up  recommendations include: Prescribed antidepressant medication treatment and Suicide Risk Assessment performed.           TREATMENT PLAN/GOALS: Continue supportive psychotherapy efforts and medications as indicated. Treatment and medication options discussed during today's visit. Patient acknowledged and verbally consented to continue with current treatment plan and was educated on the importance of compliance with treatment and follow-up appointments.    MEDICATION ISSUES:  BRYNN reviewed as expected.  Discussed medication options and treatment plan of prescribed medication as well as the risks, benefits, and side effects including potential falls, possible impaired driving and metabolic adversities among others. Patient is agreeable to call the office with any worsening of symptoms or onset of side effects. Patient is agreeable to call 911 or go to the nearest ER should he/she begin having SI/HI. No medication side effects or related complaints today.     MEDS ORDERED DURING VISIT:  No orders of the defined types were placed in this encounter.      Return in about 6 weeks (around 8/7/2025).         This document has been electronically signed by Kai Elliott MD  June 26, 2025 12:54 EDT    Dictated Utilizing Dragon Dictation: Part of this note may be an electronic transcription/translation of spoken language to printed text using the Dragon Dictation System.

## 2025-07-29 ENCOUNTER — TRANSCRIBE ORDERS (OUTPATIENT)
Dept: ADMINISTRATIVE | Facility: HOSPITAL | Age: 70
End: 2025-07-29
Payer: MEDICARE

## 2025-07-29 DIAGNOSIS — Z79.899 POLYPHARMACY: ICD-10-CM

## 2025-07-29 DIAGNOSIS — E55.9 VITAMIN D DEFICIENCY DISEASE: ICD-10-CM

## 2025-07-29 DIAGNOSIS — M81.0 OSTEOPOROSIS, POSTMENOPAUSAL: Primary | ICD-10-CM

## 2025-08-04 ENCOUNTER — LAB (OUTPATIENT)
Dept: LAB | Facility: HOSPITAL | Age: 70
End: 2025-08-04
Payer: MEDICARE

## 2025-08-04 DIAGNOSIS — E55.9 VITAMIN D DEFICIENCY DISEASE: ICD-10-CM

## 2025-08-04 DIAGNOSIS — N18.31 STAGE 3A CHRONIC KIDNEY DISEASE (CKD): Chronic | ICD-10-CM

## 2025-08-04 DIAGNOSIS — Z79.899 POLYPHARMACY: ICD-10-CM

## 2025-08-04 DIAGNOSIS — M81.0 OSTEOPOROSIS, POSTMENOPAUSAL: ICD-10-CM

## 2025-08-04 LAB
25(OH)D3 SERPL-MCNC: 54.1 NG/ML (ref 30–100)
ALBUMIN SERPL-MCNC: 4.4 G/DL (ref 3.5–5.2)
ALBUMIN/GLOB SERPL: 1.5 G/DL
ALP SERPL-CCNC: 62 U/L (ref 39–117)
ALT SERPL W P-5'-P-CCNC: 21 U/L (ref 1–33)
ANION GAP SERPL CALCULATED.3IONS-SCNC: 9.8 MMOL/L (ref 5–15)
ANISOCYTOSIS BLD QL: NORMAL
AST SERPL-CCNC: 30 U/L (ref 1–32)
BASOPHILS # BLD MANUAL: 0.06 10*3/MM3 (ref 0–0.2)
BASOPHILS NFR BLD MANUAL: 1 % (ref 0–1.5)
BILIRUB SERPL-MCNC: 0.4 MG/DL (ref 0–1.2)
BUN SERPL-MCNC: 17 MG/DL (ref 8–23)
BUN/CREAT SERPL: 16 (ref 7–25)
CALCIUM SPEC-SCNC: 9.4 MG/DL (ref 8.6–10.5)
CALCIUM SPEC-SCNC: 9.5 MG/DL (ref 8.6–10.5)
CHLORIDE SERPL-SCNC: 99 MMOL/L (ref 98–107)
CHOLEST SERPL-MCNC: 211 MG/DL (ref 0–200)
CO2 SERPL-SCNC: 23.2 MMOL/L (ref 22–29)
CREAT SERPL-MCNC: 1.06 MG/DL (ref 0.57–1)
CREAT UR-MCNC: 51.4 MG/DL
DEPRECATED RDW RBC AUTO: 42 FL (ref 37–54)
EGFRCR SERPLBLD CKD-EPI 2021: 57.3 ML/MIN/1.73
EOSINOPHIL # BLD MANUAL: 0.31 10*3/MM3 (ref 0–0.4)
EOSINOPHIL NFR BLD MANUAL: 5.1 % (ref 0.3–6.2)
ERYTHROCYTE [DISTWIDTH] IN BLOOD BY AUTOMATED COUNT: 12.3 % (ref 12.3–15.4)
GIANT PLATELETS: NORMAL
GLOBULIN UR ELPH-MCNC: 2.9 GM/DL
GLUCOSE SERPL-MCNC: 93 MG/DL (ref 65–99)
HCT VFR BLD AUTO: 34.5 % (ref 34–46.6)
HDLC SERPL-MCNC: 77 MG/DL (ref 40–60)
HGB BLD-MCNC: 11.5 G/DL (ref 12–15.9)
LDLC SERPL CALC-MCNC: 123 MG/DL (ref 0–100)
LDLC/HDLC SERPL: 1.58 {RATIO}
LITHIUM SERPL-SCNC: 0.7 MMOL/L (ref 0.6–1.2)
LYMPHOCYTES # BLD MANUAL: 1.61 10*3/MM3 (ref 0.7–3.1)
LYMPHOCYTES NFR BLD MANUAL: 5.1 % (ref 5–12)
MCH RBC QN AUTO: 31.3 PG (ref 26.6–33)
MCHC RBC AUTO-ENTMCNC: 33.3 G/DL (ref 31.5–35.7)
MCV RBC AUTO: 94 FL (ref 79–97)
MONOCYTES # BLD: 0.31 10*3/MM3 (ref 0.1–0.9)
NEUTROPHILS # BLD AUTO: 3.77 10*3/MM3 (ref 1.7–7)
NEUTROPHILS NFR BLD MANUAL: 62.2 % (ref 42.7–76)
PLATELET # BLD AUTO: 179 10*3/MM3 (ref 140–450)
PMV BLD AUTO: 12.2 FL (ref 6–12)
POTASSIUM SERPL-SCNC: 4.4 MMOL/L (ref 3.5–5.2)
PROT ?TM UR-MCNC: 5.7 MG/DL
PROT SERPL-MCNC: 7.3 G/DL (ref 6–8.5)
PROT/CREAT UR: 0.11 MG/G{CREAT}
PTH-INTACT SERPL-MCNC: 75.9 PG/ML (ref 15–65)
RBC # BLD AUTO: 3.67 10*6/MM3 (ref 3.77–5.28)
SODIUM SERPL-SCNC: 132 MMOL/L (ref 136–145)
TRIGL SERPL-MCNC: 60 MG/DL (ref 0–150)
VARIANT LYMPHS NFR BLD MANUAL: 26.5 % (ref 19.6–45.3)
VLDLC SERPL-MCNC: 11 MG/DL (ref 5–40)
WBC MORPH BLD: NORMAL
WBC NRBC COR # BLD AUTO: 6.06 10*3/MM3 (ref 3.4–10.8)

## 2025-08-04 PROCEDURE — 83970 ASSAY OF PARATHORMONE: CPT

## 2025-08-04 PROCEDURE — 80178 ASSAY OF LITHIUM: CPT

## 2025-08-04 PROCEDURE — 85007 BL SMEAR W/DIFF WBC COUNT: CPT

## 2025-08-04 PROCEDURE — 82570 ASSAY OF URINE CREATININE: CPT

## 2025-08-04 PROCEDURE — 80061 LIPID PANEL: CPT

## 2025-08-04 PROCEDURE — 36415 COLL VENOUS BLD VENIPUNCTURE: CPT

## 2025-08-04 PROCEDURE — 85027 COMPLETE CBC AUTOMATED: CPT

## 2025-08-04 PROCEDURE — 82306 VITAMIN D 25 HYDROXY: CPT

## 2025-08-04 PROCEDURE — 84165 PROTEIN E-PHORESIS SERUM: CPT

## 2025-08-04 PROCEDURE — 84156 ASSAY OF PROTEIN URINE: CPT

## 2025-08-04 PROCEDURE — 80053 COMPREHEN METABOLIC PANEL: CPT

## 2025-08-05 LAB
ALBUMIN SERPL ELPH-MCNC: 4.1 G/DL (ref 2.9–4.4)
ALBUMIN/GLOB SERPL: 1.6 {RATIO} (ref 0.7–1.7)
ALPHA1 GLOB SERPL ELPH-MCNC: 0.2 G/DL (ref 0–0.4)
ALPHA2 GLOB SERPL ELPH-MCNC: 0.7 G/DL (ref 0.4–1)
B-GLOBULIN SERPL ELPH-MCNC: 0.9 G/DL (ref 0.7–1.3)
GAMMA GLOB SERPL ELPH-MCNC: 0.8 G/DL (ref 0.4–1.8)
GLOBULIN SER CALC-MCNC: 2.6 G/DL (ref 2.2–3.9)
LABORATORY COMMENT REPORT: NORMAL
M PROTEIN SERPL ELPH-MCNC: NORMAL G/DL
PROT SERPL-MCNC: 6.7 G/DL (ref 6–8.5)

## 2025-08-07 ENCOUNTER — OFFICE VISIT (OUTPATIENT)
Dept: PSYCHIATRY | Facility: CLINIC | Age: 70
End: 2025-08-07
Payer: MEDICARE

## 2025-08-07 VITALS
BODY MASS INDEX: 26.75 KG/M2 | DIASTOLIC BLOOD PRESSURE: 73 MMHG | HEART RATE: 74 BPM | HEIGHT: 63 IN | SYSTOLIC BLOOD PRESSURE: 153 MMHG | WEIGHT: 151 LBS

## 2025-08-07 DIAGNOSIS — F51.05 INSOMNIA DUE TO MENTAL CONDITION: Primary | ICD-10-CM

## 2025-08-07 DIAGNOSIS — F41.1 GENERALIZED ANXIETY DISORDER: ICD-10-CM

## 2025-08-07 DIAGNOSIS — F31.81 HYPOMANIC BIPOLAR II DISORDER WITH FULL REMISSION: ICD-10-CM

## 2025-08-22 DIAGNOSIS — M81.0 AGE RELATED OSTEOPOROSIS, UNSPECIFIED PATHOLOGICAL FRACTURE PRESENCE: Primary | ICD-10-CM

## 2025-08-28 ENCOUNTER — HOSPITAL ENCOUNTER (OUTPATIENT)
Dept: INFUSION THERAPY | Facility: HOSPITAL | Age: 70
Discharge: HOME | End: 2025-08-28
Payer: MEDICARE

## 2025-08-28 VITALS
DIASTOLIC BLOOD PRESSURE: 73 MMHG | TEMPERATURE: 98 F | OXYGEN SATURATION: 100 % | HEIGHT: 63 IN | SYSTOLIC BLOOD PRESSURE: 149 MMHG | RESPIRATION RATE: 20 BRPM | WEIGHT: 147.71 LBS | HEART RATE: 68 BPM | BODY MASS INDEX: 26.17 KG/M2

## 2025-08-28 DIAGNOSIS — M81.0 AGE RELATED OSTEOPOROSIS, UNSPECIFIED PATHOLOGICAL FRACTURE PRESENCE: Primary | ICD-10-CM

## 2025-08-28 PROCEDURE — 25010000002 DENOSUMAB 60 MG/ML SOLUTION PREFILLED SYRINGE

## 2025-08-28 PROCEDURE — 96372 THER/PROPH/DIAG INJ SC/IM: CPT

## 2025-08-28 RX ADMIN — DENOSUMAB 60 MG: 60 INJECTION SUBCUTANEOUS at 10:47
